# Patient Record
Sex: FEMALE | Race: WHITE | NOT HISPANIC OR LATINO | Employment: FULL TIME | URBAN - METROPOLITAN AREA
[De-identification: names, ages, dates, MRNs, and addresses within clinical notes are randomized per-mention and may not be internally consistent; named-entity substitution may affect disease eponyms.]

---

## 2017-01-06 DIAGNOSIS — Z00.00 ROUTINE GENERAL MEDICAL EXAMINATION AT A HEALTH CARE FACILITY: ICD-10-CM

## 2017-01-06 DIAGNOSIS — E78.5 HYPERLIPIDEMIA, UNSPECIFIED HYPERLIPIDEMIA TYPE: ICD-10-CM

## 2017-01-06 DIAGNOSIS — E55.9 VITAMIN D DEFICIENCY: Primary | ICD-10-CM

## 2017-01-09 LAB
25(OH)D3+25(OH)D2 SERPL-MCNC: 31.3 NG/ML
ALBUMIN SERPL-MCNC: 4.4 G/DL (ref 3.5–5.2)
ALBUMIN/GLOB SERPL: 1.8 G/DL
ALP SERPL-CCNC: 54 U/L (ref 40–129)
ALT SERPL-CCNC: 18 U/L (ref 5–33)
AST SERPL-CCNC: 18 U/L (ref 5–32)
BASOPHILS # BLD AUTO: 0.07 10*3/MM3 (ref 0–0.2)
BASOPHILS NFR BLD AUTO: 1.4 % (ref 0–2)
BILIRUB SERPL-MCNC: 0.6 MG/DL (ref 0.2–1.2)
BUN SERPL-MCNC: 12 MG/DL (ref 6–20)
BUN/CREAT SERPL: 15.6 (ref 7–25)
CALCIUM SERPL-MCNC: 9.4 MG/DL (ref 8.6–10.5)
CHLORIDE SERPL-SCNC: 102 MMOL/L (ref 98–107)
CHOLEST SERPL-MCNC: 156 MG/DL (ref 0–200)
CO2 SERPL-SCNC: 28.5 MMOL/L (ref 22–29)
CREAT SERPL-MCNC: 0.77 MG/DL (ref 0.57–1)
EOSINOPHIL # BLD AUTO: 0.25 10*3/MM3 (ref 0.1–0.3)
EOSINOPHIL NFR BLD AUTO: 5 % (ref 0–4)
ERYTHROCYTE [DISTWIDTH] IN BLOOD BY AUTOMATED COUNT: 12.5 % (ref 11.5–14.5)
GLOBULIN SER CALC-MCNC: 2.4 GM/DL
GLUCOSE SERPL-MCNC: 85 MG/DL (ref 65–99)
HCT VFR BLD AUTO: 40.9 % (ref 37–47)
HDLC SERPL-MCNC: 63 MG/DL (ref 40–60)
HGB BLD-MCNC: 13.3 G/DL (ref 12–16)
IMM GRANULOCYTES # BLD: 0.01 10*3/MM3 (ref 0–0.03)
IMM GRANULOCYTES NFR BLD: 0.2 % (ref 0–0.5)
LDLC SERPL CALC-MCNC: 63 MG/DL (ref 0–100)
LDLC/HDLC SERPL: 1.01 {RATIO}
LYMPHOCYTES # BLD AUTO: 1.38 10*3/MM3 (ref 0.6–4.8)
LYMPHOCYTES NFR BLD AUTO: 27.5 % (ref 20–45)
MCH RBC QN AUTO: 30.5 PG (ref 27–31)
MCHC RBC AUTO-ENTMCNC: 32.5 G/DL (ref 31–37)
MCV RBC AUTO: 93.8 FL (ref 81–99)
MONOCYTES # BLD AUTO: 0.42 10*3/MM3 (ref 0–1)
MONOCYTES NFR BLD AUTO: 8.4 % (ref 3–8)
NEUTROPHILS # BLD AUTO: 2.88 10*3/MM3 (ref 1.5–8.3)
NEUTROPHILS NFR BLD AUTO: 57.5 % (ref 45–70)
NRBC BLD AUTO-RTO: 0 /100 WBC (ref 0–0)
PLATELET # BLD AUTO: 254 10*3/MM3 (ref 140–500)
POTASSIUM SERPL-SCNC: 4.2 MMOL/L (ref 3.5–5.2)
PROT SERPL-MCNC: 6.8 G/DL (ref 6–8.5)
RBC # BLD AUTO: 4.36 10*6/MM3 (ref 4.2–5.4)
SODIUM SERPL-SCNC: 143 MMOL/L (ref 136–145)
TRIGL SERPL-MCNC: 148 MG/DL (ref 0–150)
TSH SERPL DL<=0.005 MIU/L-ACNC: 1.83 MIU/ML (ref 0.27–4.2)
VLDLC SERPL CALC-MCNC: 29.6 MG/DL (ref 7–27)
WBC # BLD AUTO: 5.01 10*3/MM3 (ref 4.8–10.8)

## 2017-01-16 ENCOUNTER — TELEPHONE (OUTPATIENT)
Dept: FAMILY MEDICINE CLINIC | Facility: CLINIC | Age: 57
End: 2017-01-16

## 2017-01-16 ENCOUNTER — OFFICE VISIT (OUTPATIENT)
Dept: FAMILY MEDICINE CLINIC | Facility: CLINIC | Age: 57
End: 2017-01-16

## 2017-01-16 ENCOUNTER — HOSPITAL ENCOUNTER (OUTPATIENT)
Dept: GENERAL RADIOLOGY | Facility: HOSPITAL | Age: 57
Discharge: HOME | End: 2017-01-16
Admitting: PHYSICIAN ASSISTANT

## 2017-01-16 ENCOUNTER — HOSPITAL ENCOUNTER (OUTPATIENT)
Dept: GENERAL RADIOLOGY | Facility: HOSPITAL | Age: 57
Discharge: HOME | End: 2017-01-16

## 2017-01-16 VITALS
HEIGHT: 65 IN | OXYGEN SATURATION: 94 % | BODY MASS INDEX: 30.16 KG/M2 | RESPIRATION RATE: 16 BRPM | TEMPERATURE: 98 F | SYSTOLIC BLOOD PRESSURE: 118 MMHG | DIASTOLIC BLOOD PRESSURE: 80 MMHG | HEART RATE: 62 BPM | WEIGHT: 181 LBS

## 2017-01-16 DIAGNOSIS — Z00.00 ANNUAL PHYSICAL EXAM: Primary | ICD-10-CM

## 2017-01-16 DIAGNOSIS — E55.9 VITAMIN D DEFICIENCY: ICD-10-CM

## 2017-01-16 DIAGNOSIS — E78.2 MIXED HYPERLIPIDEMIA: ICD-10-CM

## 2017-01-16 DIAGNOSIS — J18.9 PNEUMONIA OF UPPER LOBE DUE TO INFECTIOUS ORGANISM, UNSPECIFIED LATERALITY: ICD-10-CM

## 2017-01-16 DIAGNOSIS — M25.551 RIGHT HIP PAIN: ICD-10-CM

## 2017-01-16 PROBLEM — M25.559 HIP PAIN: Status: ACTIVE | Noted: 2017-01-16

## 2017-01-16 PROCEDURE — 73502 X-RAY EXAM HIP UNI 2-3 VIEWS: CPT

## 2017-01-16 PROCEDURE — 71020 HC CHEST PA AND LATERAL: CPT

## 2017-01-16 PROCEDURE — 99396 PREV VISIT EST AGE 40-64: CPT | Performed by: PHYSICIAN ASSISTANT

## 2017-01-16 RX ORDER — PRAVASTATIN SODIUM 20 MG
20 TABLET ORAL DAILY
Qty: 90 TABLET | Refills: 3 | Status: SHIPPED | OUTPATIENT
Start: 2017-01-16 | End: 2018-01-19 | Stop reason: SDUPTHER

## 2017-01-16 NOTE — PATIENT INSTRUCTIONS
"Generic Hip Exercises  RANGE OF MOTION (ROM) AND STRETCHING EXERCISES   These exercises may help you when beginning to rehabilitate your injury. Doing them too aggressively can worsen your condition. Complete them slowly and gently. Your symptoms may resolve with or without further involvement from your physician, physical therapist or . While completing these exercises, remember:   · Restoring tissue flexibility helps normal motion to return to the joints. This allows healthier, less painful movement and activity.  · An effective stretch should be held for at least 30 seconds.  · A stretch should never be painful. You should only feel a gentle lengthening or release in the stretched tissue. If these stretches worsen your symptoms even when done gently, consult your physician, physical therapist or .  STRETCH - Hamstrings, Supine   · Lie on your back. Loop a belt or towel over the ball of your right / left foot.  · Straighten your right / left knee and slowly pull on the belt to raise your leg. Do not allow the right / left knee to bend. Keep your opposite leg flat on the floor.  · Raise the leg until you feel a gentle stretch behind your right / left knee or thigh. Hold this position for __________ seconds.  Repeat __________ times. Complete this stretch __________ times per day.   STRETCH - Hip Rotators   · Lie on your back on a firm surface. Grasp your right / left knee with your right / left hand and your ankle with your opposite hand.  · Keeping your hips and shoulders firmly planted, gently pull your right / left knee and rotate your lower leg toward your opposite shoulder until you feel a stretch in your buttocks.  · Hold this stretch for __________ seconds.  Repeat this stretch __________ times. Complete this stretch __________ times per day.  STRETCH - Hamstrings/Adductors, V-Sit   · Sit on the floor with your legs extended in a large \"V,\" keeping your knees straight.  · With " your head and chest upright, bend at your waist reaching for your right foot to stretch your left adductors.  · You should feel a stretch in your left inner thigh. Hold for __________ seconds.  · Return to the upright position to relax your leg muscles.  · Continuing to keep your chest upright, bend straight forward at your waist to stretch your hamstrings.  · You should feel a stretch behind both of your thighs and/or knees. Hold for __________ seconds.  · Return to the upright position to relax your leg muscles.  · Repeat steps 2 through 4 for opposite leg.  Repeat __________ times. Complete this exercise __________ times per day.   STRETCHING - Hip Flexors, Lunge  · Half kneel with your right / left knee on the floor and your opposite knee bent and directly over your ankle.  · Keep good posture with your head over your shoulders. Tighten your buttocks to point your tailbone downward; this will prevent your back from arching too much.  · You should feel a gentle stretch in the front of your thigh and/or hip. If you do not feel any resistance, slightly slide your opposite foot forward and then slowly lunge forward so your knee once again lines up over your ankle. Be sure your tailbone remains pointed downward.  · Hold this stretch for __________ seconds.  Repeat __________ times. Complete this stretch __________ times per day.  STRENGTHENING EXERCISES  These exercises may help you when beginning to rehabilitate your injury. They may resolve your symptoms with or without further involvement from your physician, physical therapist or . While completing these exercises, remember:   · Muscles can gain both the endurance and the strength needed for everyday activities through controlled exercises.  · Complete these exercises as instructed by your physician, physical therapist or . Progress the resistance and repetitions only as guided.  · You may experience muscle soreness or fatigue, but  the pain or discomfort you are trying to eliminate should never worsen during these exercises. If this pain does worsen, stop and make certain you are following the directions exactly. If the pain is still present after adjustments, discontinue the exercise until you can discuss the trouble with your clinician.  STRENGTH - Hip Extensors, Bridge   · Lie on your back on a firm surface. Bend your knees and place your feet flat on the floor.  · Tighten your buttocks muscles and lift your bottom off the floor until your trunk is level with your thighs. You should feel the muscles in your buttocks and back of your thighs working. If you do not feel these muscles, slide your feet 1-2 inches further away from your buttocks.  · Hold this position for __________ seconds.  · Slowly lower your hips to the starting position and allow your buttock muscles relax completely before beginning the next repetition.  · If this exercise is too easy, you may cross your arms over your chest.  Repeat __________ times. Complete this exercise __________ times per day.   STRENGTH - Hip Abductors, Straight Leg Raises   Be aware of your form throughout the entire exercise so that you exercise the correct muscles. Sloppy form means that you are not strengthening the correct muscles.  · Lie on your side so that your head, shoulders, knee and hip line up. You may bend your lower knee to help maintain your balance. Your right / left leg should be on top.  · Roll your hips slightly forward, so that your hips are stacked directly over each other and your right / left knee is facing forward.  · Lift your top leg up 4-6 inches, leading with your heel. Be sure that your foot does not drift forward or that your knee does not roll toward the ceiling.  · Hold this position for __________ seconds. You should feel the muscles in your outer hip lifting (you may not notice this until your leg begins to tire).  · Slowly lower your leg to the starting position.  "Allow the muscles to fully relax before beginning the next repetition.  Repeat __________ times. Complete this exercise __________ times per day.   STRENGTH - Hip Adductors, Straight Leg Raises   · Lie on your side so that your head, shoulders, knee and hip line up. You may place your upper foot in front to help maintain your balance. Your right / left leg should be on the bottom.  · Roll your hips slightly forward, so that your hips are stacked directly over each other and your right / left knee is facing forward.  · Tense the muscles in your inner thigh and lift your bottom leg 4-6 inches. Hold this position for __________ seconds.  · Slowly lower your leg to the starting position. Allow the muscles to fully relax before beginning the next repetition.  Repeat __________ times. Complete this exercise __________ times per day.   STRENGTH - Quadriceps, Straight Leg Raises   Quality counts! Watch for signs that the quadriceps muscle is working to insure you are strengthening the correct muscles and not \"cheating\" by substituting with healthier muscles.  · Lay on your back with your right / left leg extended and your opposite knee bent.  · Tense the muscles in the front of your right / left thigh. You should see either your knee cap slide up or increased dimpling just above the knee. Your thigh may even quiver.  · Tighten these muscles even more and raise your leg 4 to 6 inches off the floor. Hold for right / left seconds.  · Keeping these muscles tense, lower your leg.  · Relax the muscles slowly and completely in between each repetition.  Repeat __________ times. Complete this exercise __________ times per day.   STRENGTH - Hip Abductors, Standing  · Tie one end of a rubber exercise band/tubing to a secure surface (table, pole) and tie a loop at the other end.  · Place the loop around your right / left ankle. Keeping your ankle with the band directly opposite of the secured end, step away until there is tension in " "the tube/band.  · Hold onto a chair as needed for balance.  · Keeping your back upright, your shoulders over your hips, and your toes pointing forward, lift your right / left leg out to your side. Be sure to lift your leg with your hip muscles. Do not \"throw\" your leg or tip your body to lift your leg.  · Slowly and with control, return to the starting position.  Repeat exercise __________ times. Complete this exercise __________ times per day.   STRENGTH - Quadriceps, Squats  ·  a door frame so that your feet and knees are in line with the frame.  · Use your hands for balance, not support, on the frame.  · Slowly lower your weight, bending at the hips and knees. Keep your lower legs upright so that they are parallel with the door frame. Squat only within the range that does not increase your knee pain. Never let your hips drop below your knees.  · Slowly return upright, pushing with your legs, not pulling with your hands.     This information is not intended to replace advice given to you by your health care provider. Make sure you discuss any questions you have with your health care provider.     Document Released: 01/05/2007 Document Revised: 01/08/2016 Document Reviewed: 04/01/2010  Elsevier Interactive Patient Education ©2016 [employer]    "

## 2017-01-16 NOTE — TELEPHONE ENCOUNTER
----- Message from Sari Yanes PA-C sent at 1/16/2017 12:36 PM EST -----  notify patient that chest x-ray was normal.  Pneumonia has resolved.

## 2017-01-16 NOTE — PROGRESS NOTES
Subjective   Nesha Kee is a 56 y.o. female.   Chief Complaint   Patient presents with   • Annual Exam     Lab review       History of Present Illness   Nesha is a 56-year-old female who presents for annual physical examination.  She has gained 3 pounds since her November 30, 2016 office visit. Nesha is doing good.  She still has a slight cough with exercising.  She was diagnosed with pneumonia in November,2016.    Bowel movements are daily.  Denied any dark black tarry stools.   Last PAP in 2015 with Dr. Torrez which was normal. Nesha had a partial hysterectomy.  Mammogram was performed in December with normal results.  Nesha has been having right hip pain for the past 3 months.  Describes the pain as a sharp pain that is worse with movement.  The pain waxes and wanes.   She has had some restless legs for the past few months.  Nesha has been using over-the-counter lavender lotion which has helped.  Denied any recent injury/falls.  She has not taken any OTC medications or creams for hip pain.   Denied any chest pain, shortness of air, dizziness, headache, vision changes, swelling of ankles or upper respiratory symptoms.  Nesha refuses flu shot at today's office visit.  Colonoscopy, mammogram and DEXA scan 6 are up-to-date.  Appetite has been healthy.      The following portions of the patient's history were reviewed and updated as appropriate: allergies, current medications, past family history, past medical history, past social history and past surgical history.    Review of Systems   Constitutional: Negative.  Negative for fatigue and fever.   HENT: Negative.    Eyes: Negative.    Respiratory: Negative.  Negative for cough, shortness of breath and wheezing.    Cardiovascular: Negative.  Negative for chest pain, palpitations and leg swelling.   Gastrointestinal: Negative.    Endocrine: Negative.    Genitourinary: Negative.    Musculoskeletal: Negative.         Right hip   Skin: Negative.   "  Allergic/Immunologic: Negative.    Neurological: Negative.    Hematological: Negative.    Psychiatric/Behavioral: Negative.  Negative for sleep disturbance and suicidal ideas.   All other systems reviewed and are negative.    Vitals:    01/16/17 0811   BP: 118/80   BP Location: Right arm   Patient Position: Sitting   Cuff Size: Adult   Pulse: 62   Resp: 16   Temp: 98 °F (36.7 °C)   TempSrc: Oral   SpO2: 94%   Weight: 181 lb (82.1 kg)   Height: 65\" (165.1 cm)       BP Readings from Last 3 Encounters:   01/16/17 118/80   11/30/16 110/72   11/20/16 129/80       Wt Readings from Last 3 Encounters:   01/16/17 181 lb (82.1 kg)   11/30/16 178 lb (80.7 kg)   11/20/16 175 lb (79.4 kg)     Body mass index is 30.12 kg/(m^2).    Allergies   Allergen Reactions   • Iodinated Diagnostic Agents    • Other      Iv contrast   • Sulfa Antibiotics        Objective   Physical Exam   Constitutional: She is oriented to person, place, and time. Vital signs are normal. She appears well-developed and well-nourished.   HENT:   Head: Normocephalic and atraumatic.   Right Ear: Hearing, tympanic membrane, external ear and ear canal normal.   Left Ear: Hearing, tympanic membrane, external ear and ear canal normal.   Nose: Nose normal. Right sinus exhibits no maxillary sinus tenderness and no frontal sinus tenderness. Left sinus exhibits no maxillary sinus tenderness and no frontal sinus tenderness.   Mouth/Throat: Uvula is midline, oropharynx is clear and moist and mucous membranes are normal.   Eyes: Conjunctivae, EOM and lids are normal. Pupils are equal, round, and reactive to light.   Neck: Trachea normal and phonation normal. Neck supple. Carotid bruit is not present. No edema present. No thyroid mass and no thyromegaly present.   Cardiovascular: Normal rate, regular rhythm, S1 normal, S2 normal, normal heart sounds and normal pulses.    Pulmonary/Chest: Effort normal and breath sounds normal.   Abdominal: Soft. Normal appearance, normal " aorta and bowel sounds are normal. There is no hepatomegaly. There is no tenderness.   Musculoskeletal: Normal range of motion.        Right hip: She exhibits tenderness (slightly) and bony tenderness. She exhibits normal range of motion, normal strength, no swelling and no crepitus.        Left hip: Normal. She exhibits normal range of motion, normal strength, no tenderness, no bony tenderness, no swelling, no crepitus, no deformity and no laceration.   Lymphadenopathy:     She has no cervical adenopathy.   Neurological: She is alert and oriented to person, place, and time. She has normal strength. No sensory deficit.   Skin: Skin is warm, dry and intact.   Psychiatric: She has a normal mood and affect. Her speech is normal and behavior is normal. Judgment and thought content normal. Cognition and memory are normal.       Assessment/Plan   Nesha was seen today for annual exam.    Diagnoses and all orders for this visit:    Annual physical exam    Mixed hyperlipidemia  -     pravastatin (PRAVACHOL) 20 MG tablet; Take 1 tablet by mouth Daily.    Vitamin D deficiency    Right hip pain  -     XR Hip With or Without Pelvis 2 - 3 View Right; Future      1. Annual physical examination I have discussed Nesha's lab work with her at today's office visit.  She will decrease her vitamin D to 2000 international units daily due to having normal vitamin D blood work this time.  Plan to repeat in 6 months.  2.  Chronic hyperlipidemia: Shiela cholesterol and triglycerides have improved.  She would like to try decreasing her pravastatin to 20 mg.  I have agreed to this.  We will recheck her final and 6 months.  I have sent a new prescription for pravastatin 20 mg for 90 days to her local pharmacy.  3.  New right hip pain: Nesha will have a right hip x-ray performed at today's office visit.  I suspect this may be bursitis or strangulated.  I have given her exercises to do at home as well.  He may use over-the-counter NSAIDs as  needed.  We will consider possible referral to orthopedic or physical therapist if warranted.        Orders Only on 01/06/2017   Component Date Value Ref Range Status   • TSH 01/09/2017 1.830  0.270 - 4.200 mIU/mL Final   • Total Cholesterol 01/09/2017 156  0 - 200 mg/dL Final   • Triglycerides 01/09/2017 148  0 - 150 mg/dL Final   • HDL Cholesterol 01/09/2017 63* 40 - 60 mg/dL Final   • VLDL Cholesterol 01/09/2017 29.6* 7 - 27 mg/dL Final   • LDL Cholesterol  01/09/2017 63  0 - 100 mg/dL Final   • LDL/HDL Ratio 01/09/2017 1.01   Final   • Glucose 01/09/2017 85  65 - 99 mg/dL Final   • BUN 01/09/2017 12  6 - 20 mg/dL Final   • Creatinine 01/09/2017 0.77  0.57 - 1.00 mg/dL Final   • eGFR Non  Am 01/09/2017 77  >60 mL/min/1.73 Final   • eGFR African Am 01/09/2017 94  >60 mL/min/1.73 Final   • BUN/Creatinine Ratio 01/09/2017 15.6  7.0 - 25.0 Final   • Sodium 01/09/2017 143  136 - 145 mmol/L Final   • Potassium 01/09/2017 4.2  3.5 - 5.2 mmol/L Final   • Chloride 01/09/2017 102  98 - 107 mmol/L Final   • Total CO2 01/09/2017 28.5  22.0 - 29.0 mmol/L Final   • Calcium 01/09/2017 9.4  8.6 - 10.5 mg/dL Final   • Total Protein 01/09/2017 6.8  6.0 - 8.5 g/dL Final   • Albumin 01/09/2017 4.40  3.50 - 5.20 g/dL Final   • Globulin 01/09/2017 2.4  gm/dL Final   • A/G Ratio 01/09/2017 1.8  g/dL Final   • Total Bilirubin 01/09/2017 0.6  0.2 - 1.2 mg/dL Final   • Alkaline Phosphatase 01/09/2017 54  40 - 129 U/L Final   • AST (SGOT) 01/09/2017 18  5 - 32 U/L Final   • ALT (SGPT) 01/09/2017 18  5 - 33 U/L Final   • WBC 01/09/2017 5.01  4.80 - 10.80 10*3/mm3 Final   • RBC 01/09/2017 4.36  4.20 - 5.40 10*6/mm3 Final   • Hemoglobin 01/09/2017 13.3  12.0 - 16.0 g/dL Final   • Hematocrit 01/09/2017 40.9  37.0 - 47.0 % Final   • MCV 01/09/2017 93.8  81.0 - 99.0 fL Final   • MCH 01/09/2017 30.5  27.0 - 31.0 pg Final   • MCHC 01/09/2017 32.5  31.0 - 37.0 g/dL Final   • RDW 01/09/2017 12.5  11.5 - 14.5 % Final   • Platelets 01/09/2017  254  140 - 500 10*3/mm3 Final   • Neutrophil Rel % 01/09/2017 57.5  45.0 - 70.0 % Final   • Lymphocyte Rel % 01/09/2017 27.5  20.0 - 45.0 % Final   • Monocyte Rel % 01/09/2017 8.4* 3.0 - 8.0 % Final   • Eosinophil Rel % 01/09/2017 5.0* 0.0 - 4.0 % Final   • Basophil Rel % 01/09/2017 1.4  0.0 - 2.0 % Final   • Neutrophils Absolute 01/09/2017 2.88  1.50 - 8.30 10*3/mm3 Final   • Lymphocytes Absolute 01/09/2017 1.38  0.60 - 4.80 10*3/mm3 Final   • Monocytes Absolute 01/09/2017 0.42  0.00 - 1.00 10*3/mm3 Final   • Eosinophils Absolute 01/09/2017 0.25  0.10 - 0.30 10*3/mm3 Final   • Basophils Absolute 01/09/2017 0.07  0.00 - 0.20 10*3/mm3 Final   • Immature Granulocyte Rel % 01/09/2017 0.2  0.0 - 0.5 % Final   • Immature Grans Absolute 01/09/2017 0.01  0.00 - 0.03 10*3/mm3 Final   • nRBC 01/09/2017 0.0  0.0 - 0.0 /100 WBC Final   • 25 Hydroxy, Vitamin D 01/09/2017 31.3  ng/mL Final    Comment: Reference Range for Total Vitamin D 25(OH)  Deficiency    <20.0 ng/mL  Insufficiency 21-29 ng/mL  Sufficiency   30-74 ng/mL             Dragon transcription disclaimer    Much of this encounter note is an electronic transcription/translation of spoken language to printed text.  The electronic translation of spoken language may permit erroneous, or at times, nonsensical words or phrases to be inadvertently transcribed.  Although I have reviewed the note for such errors, some may still exist.    Sari Yanes PA-C  [facility]

## 2017-01-18 DIAGNOSIS — M25.551 RIGHT HIP PAIN: Primary | ICD-10-CM

## 2017-01-19 ENCOUNTER — TELEPHONE (OUTPATIENT)
Dept: FAMILY MEDICINE CLINIC | Facility: CLINIC | Age: 57
End: 2017-01-19

## 2017-01-19 NOTE — TELEPHONE ENCOUNTER
----- Message from Sari Yanes PA-C sent at 1/18/2017  7:29 AM EST -----  Please notify patient right hip x-ray was normal.  I will schedule appointment with physical therapy for further evaluation and treatment.

## 2017-07-03 DIAGNOSIS — E55.9 VITAMIN D DEFICIENCY: ICD-10-CM

## 2017-07-03 DIAGNOSIS — E06.5 CHRONIC THYROIDITIS: ICD-10-CM

## 2017-07-03 DIAGNOSIS — E78.2 MIXED HYPERLIPIDEMIA: Primary | ICD-10-CM

## 2017-07-03 DIAGNOSIS — Z79.899 HIGH RISK MEDICATION USE: ICD-10-CM

## 2017-07-05 LAB
25(OH)D3+25(OH)D2 SERPL-MCNC: 19 NG/ML
ALBUMIN SERPL-MCNC: 4.4 G/DL (ref 3.5–5.2)
ALBUMIN/GLOB SERPL: 1.8 G/DL
ALP SERPL-CCNC: 53 U/L (ref 40–129)
ALT SERPL-CCNC: 21 U/L (ref 5–33)
AST SERPL-CCNC: 17 U/L (ref 5–32)
BASOPHILS # BLD AUTO: 0.06 10*3/MM3 (ref 0–0.2)
BASOPHILS NFR BLD AUTO: 1.3 % (ref 0–2)
BILIRUB SERPL-MCNC: 0.4 MG/DL (ref 0.2–1.2)
BUN SERPL-MCNC: 11 MG/DL (ref 6–20)
BUN/CREAT SERPL: 13.4 (ref 7–25)
CALCIUM SERPL-MCNC: 9.1 MG/DL (ref 8.6–10.5)
CHLORIDE SERPL-SCNC: 100 MMOL/L (ref 98–107)
CHOLEST SERPL-MCNC: 197 MG/DL (ref 0–200)
CO2 SERPL-SCNC: 25.5 MMOL/L (ref 22–29)
CREAT SERPL-MCNC: 0.82 MG/DL (ref 0.57–1)
EOSINOPHIL # BLD AUTO: 0.16 10*3/MM3 (ref 0.1–0.3)
EOSINOPHIL NFR BLD AUTO: 3.4 % (ref 0–4)
ERYTHROCYTE [DISTWIDTH] IN BLOOD BY AUTOMATED COUNT: 12.5 % (ref 11.5–14.5)
GLOBULIN SER CALC-MCNC: 2.4 GM/DL
GLUCOSE SERPL-MCNC: 94 MG/DL (ref 65–99)
HCT VFR BLD AUTO: 41.2 % (ref 37–47)
HDLC SERPL-MCNC: 60 MG/DL (ref 40–60)
HGB BLD-MCNC: 14 G/DL (ref 12–16)
IMM GRANULOCYTES # BLD: 0.01 10*3/MM3 (ref 0–0.03)
IMM GRANULOCYTES NFR BLD: 0.2 % (ref 0–0.5)
LDLC SERPL CALC-MCNC: 97 MG/DL (ref 0–100)
LDLC/HDLC SERPL: 1.61 {RATIO}
LYMPHOCYTES # BLD AUTO: 1.31 10*3/MM3 (ref 0.6–4.8)
LYMPHOCYTES NFR BLD AUTO: 27.9 % (ref 20–45)
MCH RBC QN AUTO: 30.8 PG (ref 27–31)
MCHC RBC AUTO-ENTMCNC: 34 G/DL (ref 31–37)
MCV RBC AUTO: 90.5 FL (ref 81–99)
MONOCYTES # BLD AUTO: 0.3 10*3/MM3 (ref 0–1)
MONOCYTES NFR BLD AUTO: 6.4 % (ref 3–8)
NEUTROPHILS # BLD AUTO: 2.86 10*3/MM3 (ref 1.5–8.3)
NEUTROPHILS NFR BLD AUTO: 60.8 % (ref 45–70)
NRBC BLD AUTO-RTO: 0 /100 WBC (ref 0–0)
PLATELET # BLD AUTO: 268 10*3/MM3 (ref 140–500)
POTASSIUM SERPL-SCNC: 4 MMOL/L (ref 3.5–5.2)
PROT SERPL-MCNC: 6.8 G/DL (ref 6–8.5)
RBC # BLD AUTO: 4.55 10*6/MM3 (ref 4.2–5.4)
SODIUM SERPL-SCNC: 139 MMOL/L (ref 136–145)
TRIGL SERPL-MCNC: 202 MG/DL (ref 0–150)
TSH SERPL DL<=0.005 MIU/L-ACNC: 1.64 MIU/ML (ref 0.27–4.2)
VLDLC SERPL CALC-MCNC: 40.4 MG/DL (ref 7–27)
WBC # BLD AUTO: 4.7 10*3/MM3 (ref 4.8–10.8)

## 2017-07-12 ENCOUNTER — OFFICE VISIT (OUTPATIENT)
Dept: FAMILY MEDICINE CLINIC | Facility: CLINIC | Age: 57
End: 2017-07-12

## 2017-07-12 VITALS
SYSTOLIC BLOOD PRESSURE: 128 MMHG | WEIGHT: 180 LBS | HEIGHT: 65 IN | BODY MASS INDEX: 29.99 KG/M2 | HEART RATE: 67 BPM | DIASTOLIC BLOOD PRESSURE: 88 MMHG | TEMPERATURE: 98.4 F | RESPIRATION RATE: 16 BRPM | OXYGEN SATURATION: 98 %

## 2017-07-12 DIAGNOSIS — E78.2 MIXED HYPERLIPIDEMIA: Primary | ICD-10-CM

## 2017-07-12 DIAGNOSIS — E55.9 VITAMIN D DEFICIENCY: ICD-10-CM

## 2017-07-12 DIAGNOSIS — M25.551 RIGHT HIP PAIN: ICD-10-CM

## 2017-07-12 PROCEDURE — 99213 OFFICE O/P EST LOW 20 MIN: CPT | Performed by: PHYSICIAN ASSISTANT

## 2017-07-12 RX ORDER — ERGOCALCIFEROL 1.25 MG/1
50000 CAPSULE ORAL WEEKLY
Qty: 12 CAPSULE | Refills: 2 | Status: SHIPPED | OUTPATIENT
Start: 2017-07-12 | End: 2018-04-06

## 2017-07-18 ENCOUNTER — TREATMENT (OUTPATIENT)
Dept: PHYSICAL THERAPY | Facility: CLINIC | Age: 57
End: 2017-07-18

## 2017-07-18 DIAGNOSIS — M25.551 RIGHT HIP PAIN: Primary | ICD-10-CM

## 2017-07-18 PROCEDURE — 97161 PT EVAL LOW COMPLEX 20 MIN: CPT | Performed by: PHYSICAL THERAPIST

## 2017-07-18 PROCEDURE — 97140 MANUAL THERAPY 1/> REGIONS: CPT | Performed by: PHYSICAL THERAPIST

## 2017-07-18 PROCEDURE — 97110 THERAPEUTIC EXERCISES: CPT | Performed by: PHYSICAL THERAPIST

## 2017-07-18 NOTE — PROGRESS NOTES
Physical Therapy Initial Evaluation and Plan of Care        Patient: Nesha Kee   : 1960  Diagnosis/ICD-10 Code:  Right hip pain [M25.551]  Referring practitioner: Sari Yanes PA-C  Date of Initial Visit: 2017  Today's Date: 2017  Patient seen for 1 sessions                 Subjective Evaluation    History of Present Illness  Date of onset: 10/10/2016  Mechanism of injury: Increased pain with prolonged sitting - pain in feet when attempting to stand and walk - previous hernia repair still some tenderness there      Patient Occupation:  - mainly sitting Quality of life: excellent    Pain  Current pain ratin  At best pain ratin  At worst pain ratin  Location: right lateral and anterior hip and into right foot   Quality: dull ache (needs to pop)  Relieving factors: medications and rest (ibuprofen)  Exacerbated by: prolonged sitting and walking.  Progression: worsening    Diagnostic Tests  X-ray: normal (hip)    Treatments  Current treatment: physical therapy  Patient Goals  Patient goals for therapy: decreased pain, increased motion, increased strength, independence with ADLs/IADLs, return to sport/leisure activities and return to work  Patient goal: SHORT TERM GOALS:  2 weeks       1. Pt independent with HEP  2. Pt to demonstrate kai hip strength 5/5 to improve stability with ambulation and uneven surfaces  3. Pt to report being able to walk for 10 minutes without increasing pain in the right hip    LONG TERM GOALS:   6 weeks  1. Pt to demonstrate ability to perform full functional squat with good form and control of the hips and without increasing pain  2. Pt to return to work full duty without increased pain in the right hip(s)   3. Pt to demonstrate ability to perform step up/down 8 inch step x10 safely and without pain in the right hip(s)            Objective     Palpation     Right   Hypertonic in the piriformis and TFL.     Tenderness     Right Hip    Tenderness in the PSIS, greater trochanter, inguinal ligament and sacroiliac joint.     Neurological Testing   Sensation     Lumbar   Left   Intact: light touch    Right   Intact: light touch    Active Range of Motion     Lumbar   Flexion: 100 degrees   Extension: 90 degrees with pain  Left lateral flexion: 100 degrees   Right lateral flexion: 100 degrees   Left rotation: 100 degrees   Right rotation: 100 degrees     Right Hip   External rotation (90/90): 45 degrees   Internal rotation (90/90): 35 degrees with pain    Strength/Myotome Testing     Left Hip   Planes of Motion   Flexion: 5  Abduction: 5  Adduction: 5  External rotation: 5  Internal rotation: 5    Right Hip   Planes of Motion   Flexion: 5  Abduction: 5  Adduction: 5  Right hip external rotation strength: 5-/5.  Internal rotation: 5    Left Knee   Flexion: 5  Extension: 5    Right Knee   Flexion: 5  Extension: 5    Left Ankle/Foot   Dorsiflexion: 5  Plantar flexion: 5    Right Ankle/Foot   Dorsiflexion: 5  Plantar flexion: 5    Tests       Thoracic   Negative slump.     Lumbar     Left   Positive femoral stretch.   Negative crossed SLR and passive SLR.     Right   Negative passive SLR.     Right Hip   Positive piriformis, scour and SI compression.     Additional Tests Details  Negative B [place] and PPU    Ambulation     Observational Gait   Gait: within functional limits          Assessment & Plan     Assessment  Impairments: abnormal muscle tone, abnormal or restricted ROM, activity intolerance, impaired physical strength and pain with function  Other impairment: previous lumbar discectomy   Assessment details: 56 y.o female present with 1) tender right greater trochanter and right PSIS 2) decreased trunk AROM 3) decreased right hip flexibility 4) decreased tolerance to prolonged sitting and standing   Prognosis: good    Goals  SHORT TERM GOALS:  2 weeks       1. Pt independent with HEP  2. Pt to demonstrate kai hip strength 5/5 to improve stability  with ambulation and uneven surfaces  3. Pt to report being able to walk for 10 minutes without increasing pain in the right hip    LONG TERM GOALS:   6 weeks  1. Pt to demonstrate ability to perform full functional squat with good form and control of the hips and without increasing pain  2. Pt to return to work full duty without increased pain in the right hip(s)   3. Pt to demonstrate ability to perform step up/down 8 inch step x10 safely and without pain in the right hip(s)     Plan  Therapy options: will be seen for skilled physical therapy services  Planned modality interventions: cryotherapy, electrical stimulation/Danish stimulation, ultrasound, iontophoresis and thermotherapy (hydrocollator packs)  Planned therapy interventions: flexibility, functional ROM exercises, home exercise program, joint mobilization, manual therapy, neuromuscular re-education, soft tissue mobilization, spinal/joint mobilization, strengthening, stretching and therapeutic activities  Frequency: 2x week  Duration in weeks: 8  Functional Limitations: sleeping, walking, uncomfortable because of pain, sitting, standing and stooping      Manual Therapy:    8     mins  22682;  Therapeutic Exercise:    25     mins  29074;     Neuromuscular Ele:        mins  25134;    Therapeutic Activity:          mins  90816;     Gait Training:           mins  96485;     Ultrasound:    8     mins  90221;    Electrical Stimulation:         mins  44066 ( );  Dry Needling          mins self-pay    Timed Treatment:   41   mins   Total Treatment:     63   mins    PT SIGNATURE: Kaylan Cristobal, PT   DATE TREATMENT INITIATED: 7/18/2017    Initial Certification  Certification Period: 10/16/2017  I certify that the therapy services are furnished while this patient is under my care.  The services outlined above are required by this patient, and will be reviewed every 90 days.     PHYSICIAN: Sari Yanes PA-C      DATE:     Please sign and return via  fax to [phone].. Thank you, [facility].

## 2017-07-20 ENCOUNTER — TREATMENT (OUTPATIENT)
Dept: PHYSICAL THERAPY | Facility: CLINIC | Age: 57
End: 2017-07-20

## 2017-07-20 DIAGNOSIS — M25.551 RIGHT HIP PAIN: Primary | ICD-10-CM

## 2017-07-20 PROCEDURE — 97110 THERAPEUTIC EXERCISES: CPT | Performed by: PHYSICAL THERAPIST

## 2017-07-20 PROCEDURE — 97014 ELECTRIC STIMULATION THERAPY: CPT | Performed by: PHYSICAL THERAPIST

## 2017-07-20 PROCEDURE — 97140 MANUAL THERAPY 1/> REGIONS: CPT | Performed by: PHYSICAL THERAPIST

## 2017-07-20 NOTE — PROGRESS NOTES
Physical Therapy Daily Progress Note        Visit # : 2  Nesha Kee reports: My right hip and knee have been feeling better - my low back is a little sore today - was on my feet a lot yesterday     Subjective     Objective     Postural Observations    Additional Postural Observation Details  = ASIS and iliac crest in supine prior to treatment     Tenderness     Right Hip   No tenderness in the greater trochanter.      See Exercise, Manual, and Modality Logs for complete treatment.       Assessment & Plan     Assessment  Assessment details: Held US today secondary to no tenderness right greater trochanter. Patient presented with mild tenderness and c/o low back discomfort. Tolerated gentle lumbar stabilization progressions well today. Cont PT 2-3x per week for lumbar stabilization progressions and SI alignment assessment - next visit attempt bridges, and/or  with marches if tolerated.         Progress strengthening /stabilization /functional activity           Manual Therapy:    8     mins  12184;  Therapeutic Exercise:    22     mins  32242;     Neuromuscular Ele:        mins  26024;    Therapeutic Activity:          mins  38494;     Gait Training:           mins  56249;     Ultrasound:          mins  47566;    Electrical Stimulation:    15     mins  26285 ( );  Dry Needling          mins self-pay    Timed Treatment:   30   mins   Total Treatment:     45   mins    Kaylan Cristobal, PT  Physical Therapist  [place] License # [id number]

## 2017-07-24 ENCOUNTER — TREATMENT (OUTPATIENT)
Dept: PHYSICAL THERAPY | Facility: CLINIC | Age: 57
End: 2017-07-24

## 2017-07-24 DIAGNOSIS — M25.551 RIGHT HIP PAIN: Primary | ICD-10-CM

## 2017-07-24 PROCEDURE — 97014 ELECTRIC STIMULATION THERAPY: CPT | Performed by: PHYSICAL THERAPIST

## 2017-07-24 PROCEDURE — 97110 THERAPEUTIC EXERCISES: CPT | Performed by: PHYSICAL THERAPIST

## 2017-07-24 NOTE — PROGRESS NOTES
Physical Therapy Daily Progress Note        Visit # : 3  Nesha Kee reports: I went for a walk this morning and my right hip is hurting a little bit - I bet I am out     Subjective     Objective     Postural Observations    Additional Postural Observation Details  Left Upslip and Infare present today     See Exercise, Manual, and Modality Logs for complete treatment.       Assessment & Plan     Assessment  Assessment details: Minimal tenderness R greater trochanter. Patient presented with mild tenderness and c/o low back discomfort. Tolerated gentle lumbar stabilization progressions well today. Cont PT 2-3x per week for lumbar stabilization progressions and SI alignment assessment - next visit attempt AH with marches and/or prone leg lifts  if tolerated.         Progress strengthening /stabilization /functional activity           Manual Therapy:    8     mins  46000;  Therapeutic Exercise:    25     mins  46956;     Neuromuscular Ele:        mins  05218;    Therapeutic Activity:          mins  25225;     Gait Training:           mins  73693;     Ultrasound:          mins  91979;    Electrical Stimulation:    15     mins  84793 ( );  Dry Needling          mins self-pay    Timed Treatment:   33   mins   Total Treatment:     48   mins    Kaylan Cristobal PT  Physical Therapist  [place] License # [id number]

## 2017-07-27 ENCOUNTER — TREATMENT (OUTPATIENT)
Dept: PHYSICAL THERAPY | Facility: CLINIC | Age: 57
End: 2017-07-27

## 2017-07-27 DIAGNOSIS — M25.551 RIGHT HIP PAIN: Primary | ICD-10-CM

## 2017-07-27 PROCEDURE — 97014 ELECTRIC STIMULATION THERAPY: CPT | Performed by: PHYSICAL THERAPIST

## 2017-07-27 PROCEDURE — 97140 MANUAL THERAPY 1/> REGIONS: CPT | Performed by: PHYSICAL THERAPIST

## 2017-07-27 PROCEDURE — 97530 THERAPEUTIC ACTIVITIES: CPT | Performed by: PHYSICAL THERAPIST

## 2017-07-27 PROCEDURE — 97110 THERAPEUTIC EXERCISES: CPT | Performed by: PHYSICAL THERAPIST

## 2017-07-27 NOTE — PROGRESS NOTES
Physical Therapy Daily Progress Note        Visit # : 4  Nesha Kee reports: My lower back and hip have been feeling pretty good - occasional low back pain     Subjective     Objective     Postural Observations    Additional Postural Observation Details  Right inflare and Upslip prior to treatment      See Exercise, Manual, and Modality Logs for complete treatment.       Assessment & Plan     Assessment  Assessment details: Patient presented with mild right upslip and inflare prior to treatment today. Tolerated gentle lumbar stabilization progressions well today. Cont PT 2-3x per week for lumbar stabilization progressions and SI alignment assessment - next visit attempt roll for control and/or resisted side-lying ER  if tolerated.         Progress strengthening /stabilization /functional activity           Manual Therapy:    8     mins  59004;  Therapeutic Exercise:    20     mins  36040;     Neuromuscular Ele:        mins  00539;    Therapeutic Activity:     10     mins  39847;     Gait Training:           mins  33266;     Ultrasound:          mins  37703;    Electrical Stimulation:    15     mins  35763 ( );  Dry Needling          mins self-pay    Timed Treatment:   38   mins   Total Treatment:     53   mins    Kaylan Cristobal, PT  Physical Therapist  [place] License # [id number]

## 2017-08-02 ENCOUNTER — TREATMENT (OUTPATIENT)
Dept: PHYSICAL THERAPY | Facility: CLINIC | Age: 57
End: 2017-08-02

## 2017-08-02 DIAGNOSIS — M25.551 RIGHT HIP PAIN: Primary | ICD-10-CM

## 2017-08-02 PROCEDURE — 97014 ELECTRIC STIMULATION THERAPY: CPT | Performed by: PHYSICAL THERAPIST

## 2017-08-02 PROCEDURE — 97110 THERAPEUTIC EXERCISES: CPT | Performed by: PHYSICAL THERAPIST

## 2017-08-02 PROCEDURE — 97530 THERAPEUTIC ACTIVITIES: CPT | Performed by: PHYSICAL THERAPIST

## 2017-08-02 NOTE — PROGRESS NOTES
Physical Therapy Daily Progress Note         Visit # : 5  Nesha Kee reports: I noticed my right hip was popping when doing my exercise over the weekend     Subjective     Objective     Postural Observations    Additional Postural Observation Details  = supine ASIS and Iliac Crest prior to treatment      See Exercise, Manual, and Modality Logs for complete treatment.       Assessment & Plan     Assessment  Assessment details: Patient presented with mild tenderness and c/o right hip popping. Inflare presented after there ex attempts today however, hip popping was not able to be recreated. Tolerated gentle lumbar stabilization progressions well today. Cont PT 2-3x per week for lumbar stabilization progressions and SI alignment assessment - next visit attempt alternate arm and/or quadruped leg lifts  if tolerated.         Progress strengthening /stabilization /functional activity           Manual Therapy:    2     mins  04573;  Therapeutic Exercise:    20     mins  15480;     Neuromuscular Ele:        mins  48911;    Therapeutic Activity:     13     mins  34773;     Gait Training:           mins  52145;     Ultrasound:          mins  07948;    Electrical Stimulation:    15     mins  17269 ( );  Dry Needling          mins self-pay    Timed Treatment:   35   mins   Total Treatment:     50   mins    Kaylan Cristobal PT  Physical Therapist  [place] License # [id number]

## 2017-08-07 ENCOUNTER — TREATMENT (OUTPATIENT)
Dept: PHYSICAL THERAPY | Facility: CLINIC | Age: 57
End: 2017-08-07

## 2017-08-07 DIAGNOSIS — M25.551 RIGHT HIP PAIN: Primary | ICD-10-CM

## 2017-08-07 PROCEDURE — 97530 THERAPEUTIC ACTIVITIES: CPT | Performed by: PHYSICAL THERAPIST

## 2017-08-07 PROCEDURE — 97014 ELECTRIC STIMULATION THERAPY: CPT | Performed by: PHYSICAL THERAPIST

## 2017-08-07 PROCEDURE — 97110 THERAPEUTIC EXERCISES: CPT | Performed by: PHYSICAL THERAPIST

## 2017-08-07 NOTE — PROGRESS NOTES
Physical Therapy Daily Progress Note      Visit # : 6  Nesha Kee reports: My feet have been hurting me in the mornings but my hip has really felt better    Subjective     Objective     Postural Observations    Additional Postural Observation Details  Minimal left inflare prior to treatment today      See Exercise, Manual, and Modality Logs for complete treatment.       Assessment & Plan     Assessment  Assessment details: Patient reports minimal hip popping since last visit. Inflare present prior to there ex attempts today. Tolerated gentle lumbar stabilization progressions well today. Cont PT 2-3x per week for lumbar stabilization progressions and SI alignment assessment - next visit attempt resisted ambulation if tolerated.         Progress strengthening /stabilization /functional activity           Manual Therapy:    2     mins  88393;  Therapeutic Exercise:    23     mins  39163;     Neuromuscular Ele:        mins  91833;    Therapeutic Activity:     10     mins  33334;     Gait Training:           mins  06041;     Ultrasound:          mins  73717;    Electrical Stimulation:    15     mins  30714 ( );  Dry Needling          mins self-pay    Timed Treatment:   35   mins   Total Treatment:     50   mins    Kaylan Cristobal PT  Physical Therapist  [place] License # [id number]

## 2017-11-07 ENCOUNTER — HOSPITAL ENCOUNTER (OUTPATIENT)
Dept: GENERAL RADIOLOGY | Facility: HOSPITAL | Age: 57
Discharge: HOME | End: 2017-11-07
Attending: PODIATRIST | Admitting: PODIATRIST

## 2017-11-07 ENCOUNTER — TRANSCRIBE ORDERS (OUTPATIENT)
Dept: ADMINISTRATIVE | Facility: HOSPITAL | Age: 57
End: 2017-11-07

## 2017-11-07 DIAGNOSIS — M21.621 TAILOR'S BUNION OF RIGHT FOOT: ICD-10-CM

## 2017-11-07 DIAGNOSIS — M21.621 TAILOR'S BUNION OF RIGHT FOOT: Primary | ICD-10-CM

## 2017-11-07 PROCEDURE — 73630 X-RAY EXAM OF FOOT: CPT

## 2017-12-27 ENCOUNTER — TRANSCRIBE ORDERS (OUTPATIENT)
Dept: FAMILY MEDICINE CLINIC | Facility: CLINIC | Age: 57
End: 2017-12-27

## 2017-12-27 DIAGNOSIS — Z12.39 SCREENING BREAST EXAMINATION: Primary | ICD-10-CM

## 2018-01-08 DIAGNOSIS — E06.5 CHRONIC THYROIDITIS: ICD-10-CM

## 2018-01-08 DIAGNOSIS — Z11.59 NEED FOR HEPATITIS C SCREENING TEST: ICD-10-CM

## 2018-01-08 DIAGNOSIS — E55.9 VITAMIN D DEFICIENCY: ICD-10-CM

## 2018-01-08 DIAGNOSIS — E78.2 MIXED HYPERLIPIDEMIA: Primary | ICD-10-CM

## 2018-01-11 ENCOUNTER — TELEPHONE (OUTPATIENT)
Dept: FAMILY MEDICINE CLINIC | Facility: CLINIC | Age: 58
End: 2018-01-11

## 2018-01-11 LAB
25(OH)D3+25(OH)D2 SERPL-MCNC: 31.3 NG/ML
ALBUMIN SERPL-MCNC: 4.2 G/DL (ref 3.5–5.2)
ALBUMIN/GLOB SERPL: 1.9 G/DL
ALP SERPL-CCNC: 58 U/L (ref 40–129)
ALT SERPL-CCNC: 24 U/L (ref 5–33)
AST SERPL-CCNC: 19 U/L (ref 5–32)
BASOPHILS # BLD AUTO: 0.07 10*3/MM3 (ref 0–0.2)
BASOPHILS NFR BLD AUTO: 1.4 % (ref 0–2)
BILIRUB SERPL-MCNC: 0.3 MG/DL (ref 0.2–1.2)
BUN SERPL-MCNC: 12 MG/DL (ref 6–20)
BUN/CREAT SERPL: 15.2 (ref 7–25)
CALCIUM SERPL-MCNC: 9.3 MG/DL (ref 8.6–10.5)
CHLORIDE SERPL-SCNC: 102 MMOL/L (ref 98–107)
CHOLEST SERPL-MCNC: 195 MG/DL (ref 0–200)
CO2 SERPL-SCNC: 29.2 MMOL/L (ref 22–29)
CREAT SERPL-MCNC: 0.79 MG/DL (ref 0.57–1)
EOSINOPHIL # BLD AUTO: 0.18 10*3/MM3 (ref 0.1–0.3)
EOSINOPHIL NFR BLD AUTO: 3.6 % (ref 0–4)
ERYTHROCYTE [DISTWIDTH] IN BLOOD BY AUTOMATED COUNT: 12 % (ref 11.5–14.5)
GLOBULIN SER CALC-MCNC: 2.2 GM/DL
GLUCOSE SERPL-MCNC: 87 MG/DL (ref 65–99)
HCT VFR BLD AUTO: 40.9 % (ref 37–47)
HCV AB S/CO SERPL IA: <0.1 S/CO RATIO (ref 0–0.9)
HDLC SERPL-MCNC: 60 MG/DL (ref 40–60)
HGB BLD-MCNC: 13.6 G/DL (ref 12–16)
IMM GRANULOCYTES # BLD: 0.01 10*3/MM3 (ref 0–0.03)
IMM GRANULOCYTES NFR BLD: 0.2 % (ref 0–0.5)
LDLC SERPL CALC-MCNC: 100 MG/DL (ref 0–100)
LDLC/HDLC SERPL: 1.66 {RATIO}
LYMPHOCYTES # BLD AUTO: 1.34 10*3/MM3 (ref 0.6–4.8)
LYMPHOCYTES NFR BLD AUTO: 26.7 % (ref 20–45)
MCH RBC QN AUTO: 31.2 PG (ref 27–31)
MCHC RBC AUTO-ENTMCNC: 33.3 G/DL (ref 31–37)
MCV RBC AUTO: 93.8 FL (ref 81–99)
MONOCYTES # BLD AUTO: 0.4 10*3/MM3 (ref 0–1)
MONOCYTES NFR BLD AUTO: 8 % (ref 3–8)
NEUTROPHILS # BLD AUTO: 3.01 10*3/MM3 (ref 1.5–8.3)
NEUTROPHILS NFR BLD AUTO: 60.1 % (ref 45–70)
NRBC BLD AUTO-RTO: 0 /100 WBC (ref 0–0)
PLATELET # BLD AUTO: 251 10*3/MM3 (ref 140–500)
POTASSIUM SERPL-SCNC: 4.6 MMOL/L (ref 3.5–5.2)
PROT SERPL-MCNC: 6.4 G/DL (ref 6–8.5)
RBC # BLD AUTO: 4.36 10*6/MM3 (ref 4.2–5.4)
SODIUM SERPL-SCNC: 141 MMOL/L (ref 136–145)
TRIGL SERPL-MCNC: 176 MG/DL (ref 0–150)
TSH SERPL DL<=0.005 MIU/L-ACNC: 1.1 MIU/ML (ref 0.27–4.2)
VLDLC SERPL CALC-MCNC: 35.2 MG/DL (ref 7–27)
WBC # BLD AUTO: 5.01 10*3/MM3 (ref 4.8–10.8)

## 2018-01-11 NOTE — TELEPHONE ENCOUNTER
----- Message from Sari Yanes PA-C sent at 1/11/2018  7:24 AM EST -----  1.  Notify patient that CBC was normal.  2.  Blood sugar was 87.  3.  Cholesterol was 195, triglycerides 176, HDL 60 and LDL was 100.  Triglycerides were slightly elevated.  Please decrease fatty food and carbohydrates in diet.  Plan to repeat fasting blood work in 6 months.  4.  TSH was 1.1.  5.  Vitamin D level was normal at 31.3.  6.  Hepatitis C antibody screening test was negative

## 2018-01-15 ENCOUNTER — HOSPITAL ENCOUNTER (OUTPATIENT)
Dept: MAMMOGRAPHY | Facility: HOSPITAL | Age: 58
Discharge: HOME | End: 2018-01-15
Admitting: PHYSICIAN ASSISTANT

## 2018-01-15 ENCOUNTER — TELEPHONE (OUTPATIENT)
Dept: FAMILY MEDICINE CLINIC | Facility: CLINIC | Age: 58
End: 2018-01-15

## 2018-01-15 DIAGNOSIS — Z12.39 SCREENING BREAST EXAMINATION: ICD-10-CM

## 2018-01-15 PROCEDURE — 77063 BREAST TOMOSYNTHESIS BI: CPT

## 2018-01-15 PROCEDURE — 77067 SCR MAMMO BI INCL CAD: CPT

## 2018-01-15 NOTE — TELEPHONE ENCOUNTER
----- Message from Sari Yanes PA-C sent at 1/15/2018 10:35 AM EST -----  Please notify patient that mammogram was normal.  Plan to repeat in one year.

## 2018-01-17 ENCOUNTER — OFFICE VISIT (OUTPATIENT)
Dept: FAMILY MEDICINE CLINIC | Facility: CLINIC | Age: 58
End: 2018-01-17

## 2018-01-17 VITALS
SYSTOLIC BLOOD PRESSURE: 120 MMHG | RESPIRATION RATE: 16 BRPM | HEART RATE: 87 BPM | TEMPERATURE: 98.6 F | WEIGHT: 180 LBS | OXYGEN SATURATION: 97 % | HEIGHT: 65 IN | DIASTOLIC BLOOD PRESSURE: 72 MMHG | BODY MASS INDEX: 29.99 KG/M2

## 2018-01-17 DIAGNOSIS — Z00.00 ANNUAL PHYSICAL EXAM: Primary | ICD-10-CM

## 2018-01-17 DIAGNOSIS — N81.6 CYSTOCELE WITH RECTOCELE: ICD-10-CM

## 2018-01-17 DIAGNOSIS — E78.2 MIXED HYPERLIPIDEMIA: ICD-10-CM

## 2018-01-17 DIAGNOSIS — Z01.419 PAP SMEAR, AS PART OF ROUTINE GYNECOLOGICAL EXAMINATION: ICD-10-CM

## 2018-01-17 DIAGNOSIS — N81.10 CYSTOCELE WITH RECTOCELE: ICD-10-CM

## 2018-01-17 LAB
DEVELOPER EXPIRATION DATE: NORMAL
DEVELOPER LOT NUMBER: NORMAL
EXPIRATION DATE: NORMAL
FECAL OCCULT BLOOD SCREEN, POC: NEGATIVE
Lab: NORMAL
NEGATIVE CONTROL: NEGATIVE
POSITIVE CONTROL: POSITIVE

## 2018-01-17 PROCEDURE — 82274 ASSAY TEST FOR BLOOD FECAL: CPT | Performed by: PHYSICIAN ASSISTANT

## 2018-01-17 PROCEDURE — 99396 PREV VISIT EST AGE 40-64: CPT | Performed by: PHYSICIAN ASSISTANT

## 2018-01-17 NOTE — PROGRESS NOTES
"Subjective   Nesha Kee is a 57 y.o. female.   Chief Complaint   Patient presents with   • Annual Exam   • Hyperlipidemia       History of Present Illness     Nesha is a 57-year-old female who presents for annual physical examination and hyperlipidemia management. She is due for colonoscopy in December 2018 with Dr. Devine.  Bowel movements are daily without dark black tarry stools.  Last pap was in 2015 with Dr. Torrez..  She has had a partial hysterectomy.  Denied any breast pain/lumps/discharge,vaginal discharge,dysuria,incontinence, chest pain,shortness of air,dizziness,abdomen pain,swelling of ankles, or URI symptoms.  Appetite has been normal.  Sleep has been normal. She has been preforming breast exams at home.    The following portions of the patient's history were reviewed and updated as appropriate: allergies, current medications, past family history, past medical history, past social history and past surgical history.    Review of Systems   All other systems reviewed and are negative.    Vitals:    01/17/18 0809   BP: 120/72   BP Location: Right arm   Patient Position: Sitting   Cuff Size: Large Adult   Pulse: 87   Resp: 16   Temp: 98.6 °F (37 °C)   TempSrc: Oral   SpO2: 97%   Weight: 81.6 kg (180 lb)   Height: 165.1 cm (65\")       Wt Readings from Last 3 Encounters:   01/17/18 81.6 kg (180 lb)   07/12/17 81.6 kg (180 lb)   01/16/17 82.1 kg (181 lb)       BP Readings from Last 3 Encounters:   01/17/18 120/72   07/12/17 128/88   01/16/17 118/80     Body mass index is 29.95 kg/(m^2).  Allergies   Allergen Reactions   • Iodinated Diagnostic Agents    • Other      Iv contrast   • Sulfa Antibiotics        Objective   Physical Exam   Constitutional: She is oriented to person, place, and time. Vital signs are normal. She appears well-developed and well-nourished.   HENT:   Head: Normocephalic and atraumatic.   Right Ear: Hearing, tympanic membrane, external ear and ear canal normal.   Left Ear: " Hearing, tympanic membrane, external ear and ear canal normal.   Nose: Nose normal. Right sinus exhibits no maxillary sinus tenderness and no frontal sinus tenderness. Left sinus exhibits no maxillary sinus tenderness and no frontal sinus tenderness.   Mouth/Throat: Uvula is midline, oropharynx is clear and moist and mucous membranes are normal.   Eyes: Conjunctivae, EOM and lids are normal. Pupils are equal, round, and reactive to light.   Neck: Trachea normal and phonation normal. Neck supple. Carotid bruit is not present. No edema present. No thyroid mass and no thyromegaly present.   Cardiovascular: Normal rate, regular rhythm, S1 normal, S2 normal, normal heart sounds and normal pulses.    Pulmonary/Chest: Effort normal and breath sounds normal. Right breast exhibits no inverted nipple, no mass, no nipple discharge, no skin change and no tenderness. Left breast exhibits no inverted nipple, no mass, no nipple discharge, no skin change and no tenderness. Breasts are symmetrical. There is no breast swelling.   Abdominal: Soft. Normal appearance, normal aorta and bowel sounds are normal. There is no hepatomegaly. There is no tenderness. Hernia confirmed negative in the right inguinal area and confirmed negative in the left inguinal area.   Genitourinary: Rectal exam shows guaiac negative stool. No breast tenderness, discharge or bleeding. Pelvic exam was performed with patient supine. There is no rash, tenderness, lesion or injury on the right labia. There is no rash, tenderness, lesion or injury on the left labia. Right adnexum displays no mass, no tenderness and no fullness. Left adnexum displays no mass and no tenderness.   Genitourinary Comments: Exam was chaperoned by Ashlee Segura LPN.  Uterus is absent due to hysterectomy.  Grade 2-3 rectocele and cystocele is noted.   Lymphadenopathy:     She has no cervical adenopathy.     She has no axillary adenopathy.        Right: No inguinal adenopathy present.         Left: No inguinal adenopathy present.   Neurological: She is alert and oriented to person, place, and time. She has normal reflexes.   Reflex Scores:       Patellar reflexes are 2+ on the right side and 2+ on the left side.  Skin: Skin is warm, dry and intact.   Psychiatric: She has a normal mood and affect. Her speech is normal and behavior is normal. Judgment and thought content normal. Cognition and memory are normal.       Results for orders placed or performed in visit on 01/17/18   POC Occult Blood Stool   Result Value Ref Range    Fecal Occult Blood Negative Negative    Lot Number [id number]     Expiration Date 1312019     DEVELOPER LOT NUMBER [id number]     DEVELOPER EXPIRATION DATE 1312019     Positive Control Positive Positive    Negative Control Negative Negative       Assessment/Plan   Nesha was seen today for annual exam and hyperlipidemia.    Diagnoses and all orders for this visit:    Annual physical exam    Mixed hyperlipidemia    Pap smear, as part of routine gynecological examination  -     POC Occult Blood Stool  -     Pap IG, Rfx HPV All Pth - ThinPrep Vial, Cervix    Cystocele with rectocele  -     Ambulatory Referral to Gynecologic Urology      1.  Annual physical examination with Pap smear: A Pap smear was collected and sent to the laboratory for further evaluation.  Her in office Hemoccult was negative.  Nesha will be notified of test results when completed.  A cystocele and rectocele were noted on physical examination.  I have placed a referral to gynecologist urologist for further evaluation and treatment.  2.  Chronic hyperlipidemia: I have reviewed Nesha's lab work with her at today's office visit.  I have completed her biometric form and have given her a copy.  She was encouraged to decrease carbohydrates and fatty food in diet and to increase physical activity.  She will return to office in 6 months for reevaluation with fasting blood work.  Nesha voiced understanding.          Orders Only on  01/08/2018   Component Date Value Ref Range Status   • WBC 01/10/2018 5.01  4.80 - 10.80 10*3/mm3 Final   • RBC 01/10/2018 4.36  4.20 - 5.40 10*6/mm3 Final   • Hemoglobin 01/10/2018 13.6  12.0 - 16.0 g/dL Final   • Hematocrit 01/10/2018 40.9  37.0 - 47.0 % Final   • MCV 01/10/2018 93.8  81.0 - 99.0 fL Final   • MCH 01/10/2018 31.2* 27.0 - 31.0 pg Final   • MCHC 01/10/2018 33.3  31.0 - 37.0 g/dL Final   • RDW 01/10/2018 12.0  11.5 - 14.5 % Final   • Platelets 01/10/2018 251  140 - 500 10*3/mm3 Final   • Neutrophil Rel % 01/10/2018 60.1  45.0 - 70.0 % Final   • Lymphocyte Rel % 01/10/2018 26.7  20.0 - 45.0 % Final   • Monocyte Rel % 01/10/2018 8.0  3.0 - 8.0 % Final   • Eosinophil Rel % 01/10/2018 3.6  0.0 - 4.0 % Final   • Basophil Rel % 01/10/2018 1.4  0.0 - 2.0 % Final   • Neutrophils Absolute 01/10/2018 3.01  1.50 - 8.30 10*3/mm3 Final   • Lymphocytes Absolute 01/10/2018 1.34  0.60 - 4.80 10*3/mm3 Final   • Monocytes Absolute 01/10/2018 0.40  0.00 - 1.00 10*3/mm3 Final   • Eosinophils Absolute 01/10/2018 0.18  0.10 - 0.30 10*3/mm3 Final   • Basophils Absolute 01/10/2018 0.07  0.00 - 0.20 10*3/mm3 Final   • Immature Granulocyte Rel % 01/10/2018 0.2  0.0 - 0.5 % Final   • Immature Grans Absolute 01/10/2018 0.01  0.00 - 0.03 10*3/mm3 Final   • nRBC 01/10/2018 0.0  0.0 - 0.0 /100 WBC Final   • Glucose 01/10/2018 87  65 - 99 mg/dL Final   • BUN 01/10/2018 12  6 - 20 mg/dL Final   • Creatinine 01/10/2018 0.79  0.57 - 1.00 mg/dL Final   • eGFR Non  Am 01/10/2018 75  >60 mL/min/1.73 Final   • eGFR African Am 01/10/2018 91  >60 mL/min/1.73 Final   • BUN/Creatinine Ratio 01/10/2018 15.2  7.0 - 25.0 Final   • Sodium 01/10/2018 141  136 - 145 mmol/L Final   • Potassium 01/10/2018 4.6  3.5 - 5.2 mmol/L Final   • Chloride 01/10/2018 102  98 - 107 mmol/L Final   • Total CO2 01/10/2018 29.2* 22.0 - 29.0 mmol/L Final   • Calcium 01/10/2018 9.3  8.6 - 10.5 mg/dL Final   • Total Protein 01/10/2018 6.4  6.0 - 8.5 g/dL Final    • Albumin 01/10/2018 4.20  3.50 - 5.20 g/dL Final   • Globulin 01/10/2018 2.2  gm/dL Final   • A/G Ratio 01/10/2018 1.9  g/dL Final   • Total Bilirubin 01/10/2018 0.3  0.2 - 1.2 mg/dL Final   • Alkaline Phosphatase 01/10/2018 58  40 - 129 U/L Final   • AST (SGOT) 01/10/2018 19  5 - 32 U/L Final   • ALT (SGPT) 01/10/2018 24  5 - 33 U/L Final   • Total Cholesterol 01/10/2018 195  0 - 200 mg/dL Final   • Triglycerides 01/10/2018 176* 0 - 150 mg/dL Final   • HDL Cholesterol 01/10/2018 60  40 - 60 mg/dL Final   • VLDL Cholesterol 01/10/2018 35.2* 7 - 27 mg/dL Final   • LDL Cholesterol  01/10/2018 100  0 - 100 mg/dL Final   • LDL/HDL Ratio 01/10/2018 1.66   Final   • TSH 01/10/2018 1.100  0.270 - 4.200 mIU/mL Final   • 25 Hydroxy, Vitamin D 01/10/2018 31.3  ng/mL Final    Comment: Reference Range for Total Vitamin D 25(OH)  Deficiency    <20.0 ng/mL  Insufficiency 21-29 ng/mL  Sufficiency   30-74 ng/mL     • Hep C Virus Ab 01/10/2018 <0.1  0.0 - 0.9 s/co ratio Final    Comment:                                   Negative:     < 0.8                               Indeterminate: 0.8 - 0.9                                    Positive:     > 0.9   The CDC recommends that a positive HCV antibody result   be followed up with a HCV Nucleic Acid Amplification   test ([id number]).       Dragon transcription disclaimer    Much of this encounter note is an electronic transcription/translation of spoken language to printed text.  The electronic translation of spoken language may permit erroneous, or at times, nonsensical words or phrases to be inadvertently transcribed.  Although I have reviewed the note for such errors, some may still exist.    Sari Yanes PA-C  Family Practice

## 2018-01-18 LAB
CONV .: NORMAL
CYTOLOGIST CVX/VAG CYTO: NORMAL
CYTOLOGY CVX/VAG DOC THIN PREP: NORMAL
DX ICD CODE: NORMAL
HIV 1 & 2 AB SER-IMP: NORMAL
OTHER STN SPEC: NORMAL
PATH REPORT.FINAL DX SPEC: NORMAL
STAT OF ADQ CVX/VAG CYTO-IMP: NORMAL

## 2018-01-19 ENCOUNTER — TELEPHONE (OUTPATIENT)
Dept: FAMILY MEDICINE CLINIC | Facility: CLINIC | Age: 58
End: 2018-01-19

## 2018-01-19 DIAGNOSIS — E78.2 MIXED HYPERLIPIDEMIA: ICD-10-CM

## 2018-01-19 RX ORDER — PRAVASTATIN SODIUM 20 MG
20 TABLET ORAL DAILY
Qty: 90 TABLET | Refills: 3 | Status: SHIPPED | OUTPATIENT
Start: 2018-01-19 | End: 2019-05-26 | Stop reason: SDUPTHER

## 2018-01-19 NOTE — TELEPHONE ENCOUNTER
----- Message from Sari Yanes PA-C sent at 1/19/2018  4:44 AM EST -----  Notify patient that PAP was normal.  Plan to follow up in 1 year.

## 2018-05-24 ENCOUNTER — HOSPITAL ENCOUNTER (OUTPATIENT)
Dept: GENERAL RADIOLOGY | Facility: HOSPITAL | Age: 58
Discharge: HOME | End: 2018-05-24
Admitting: PHYSICIAN ASSISTANT

## 2018-05-24 ENCOUNTER — OFFICE VISIT (OUTPATIENT)
Dept: FAMILY MEDICINE CLINIC | Facility: CLINIC | Age: 58
End: 2018-05-24

## 2018-05-24 VITALS
BODY MASS INDEX: 31.65 KG/M2 | DIASTOLIC BLOOD PRESSURE: 76 MMHG | TEMPERATURE: 98.5 F | OXYGEN SATURATION: 98 % | RESPIRATION RATE: 16 BRPM | WEIGHT: 190 LBS | HEIGHT: 65 IN | HEART RATE: 83 BPM | SYSTOLIC BLOOD PRESSURE: 120 MMHG

## 2018-05-24 DIAGNOSIS — N81.6 CYSTOCELE WITH RECTOCELE: ICD-10-CM

## 2018-05-24 DIAGNOSIS — M79.671 ACUTE FOOT PAIN, RIGHT: ICD-10-CM

## 2018-05-24 DIAGNOSIS — N81.10 CYSTOCELE WITH RECTOCELE: ICD-10-CM

## 2018-05-24 DIAGNOSIS — Z01.818 PRE-OPERATIVE CLEARANCE: Primary | ICD-10-CM

## 2018-05-24 PROBLEM — N99.3 VAGINAL VAULT PROLAPSE AFTER HYSTERECTOMY: Status: ACTIVE | Noted: 2018-02-09

## 2018-05-24 PROBLEM — N39.46 MIXED STRESS AND URGE URINARY INCONTINENCE: Status: ACTIVE | Noted: 2018-02-09

## 2018-05-24 PROBLEM — N39.3 GENUINE STRESS INCONTINENCE, FEMALE: Status: ACTIVE | Noted: 2018-03-27

## 2018-05-24 PROBLEM — N36.41 HYPERMOBILITY OF URETHRA: Status: ACTIVE | Noted: 2018-02-09

## 2018-05-24 PROBLEM — N32.89 DECREASED BLADDER CAPACITY: Status: ACTIVE | Noted: 2018-03-27

## 2018-05-24 PROBLEM — N81.11 MIDLINE CYSTOCELE: Status: ACTIVE | Noted: 2018-02-09

## 2018-05-24 PROBLEM — R39.15 URINARY URGENCY: Status: ACTIVE | Noted: 2018-03-27

## 2018-05-24 PROBLEM — N81.9 VAGINAL VAULT PROLAPSE: Status: ACTIVE | Noted: 2018-04-16

## 2018-05-24 LAB
ALBUMIN SERPL-MCNC: 4.4 G/DL (ref 3.5–5.2)
ALBUMIN/GLOB SERPL: 1.7 G/DL
ALP SERPL-CCNC: 54 U/L (ref 40–129)
ALT SERPL-CCNC: 23 U/L (ref 5–33)
APTT PPP: 30.3 SECONDS (ref 24.3–38.1)
AST SERPL-CCNC: 18 U/L (ref 5–32)
BASOPHILS # BLD AUTO: 0.07 10*3/MM3 (ref 0–0.2)
BASOPHILS NFR BLD AUTO: 1.3 % (ref 0–2)
BILIRUB SERPL-MCNC: 0.4 MG/DL (ref 0.2–1.2)
BUN SERPL-MCNC: 13 MG/DL (ref 6–20)
BUN/CREAT SERPL: 17.3 (ref 7–25)
CALCIUM SERPL-MCNC: 9.6 MG/DL (ref 8.6–10.5)
CHLORIDE SERPL-SCNC: 101 MMOL/L (ref 98–107)
CO2 SERPL-SCNC: 28.8 MMOL/L (ref 22–29)
CREAT SERPL-MCNC: 0.75 MG/DL (ref 0.57–1)
EOSINOPHIL # BLD AUTO: 0.19 10*3/MM3 (ref 0.1–0.3)
EOSINOPHIL NFR BLD AUTO: 3.4 % (ref 0–4)
ERYTHROCYTE [DISTWIDTH] IN BLOOD BY AUTOMATED COUNT: 12.2 % (ref 11.5–14.5)
GFR SERPLBLD CREATININE-BSD FMLA CKD-EPI: 79 ML/MIN/1.73
GFR SERPLBLD CREATININE-BSD FMLA CKD-EPI: 96 ML/MIN/1.73
GLOBULIN SER CALC-MCNC: 2.6 GM/DL
GLUCOSE SERPL-MCNC: 89 MG/DL (ref 65–99)
HCT VFR BLD AUTO: 42.6 % (ref 37–47)
HGB BLD-MCNC: 14.3 G/DL (ref 12–16)
IMM GRANULOCYTES # BLD: 0 10*3/MM3 (ref 0–0.03)
IMM GRANULOCYTES NFR BLD: 0 % (ref 0–0.5)
INR PPP: 0.99 (ref 0.9–1.1)
LYMPHOCYTES # BLD AUTO: 1.47 10*3/MM3 (ref 0.6–4.8)
LYMPHOCYTES NFR BLD AUTO: 26.5 % (ref 20–45)
MCH RBC QN AUTO: 31.7 PG (ref 27–31)
MCHC RBC AUTO-ENTMCNC: 33.6 G/DL (ref 31–37)
MCV RBC AUTO: 94.5 FL (ref 81–99)
MONOCYTES # BLD AUTO: 0.48 10*3/MM3 (ref 0–1)
MONOCYTES NFR BLD AUTO: 8.7 % (ref 3–8)
NEUTROPHILS # BLD AUTO: 3.33 10*3/MM3 (ref 1.5–8.3)
NEUTROPHILS NFR BLD AUTO: 60.1 % (ref 45–70)
NRBC BLD AUTO-RTO: 0 /100 WBC (ref 0–0)
PLATELET # BLD AUTO: 268 10*3/MM3 (ref 140–500)
POTASSIUM SERPL-SCNC: 4.3 MMOL/L (ref 3.5–5.2)
PROT SERPL-MCNC: 7 G/DL (ref 6–8.5)
PROTHROMBIN TIME: 13.1 SECONDS (ref 12.1–15)
RBC # BLD AUTO: 4.51 10*6/MM3 (ref 4.2–5.4)
SODIUM SERPL-SCNC: 141 MMOL/L (ref 136–145)
WBC # BLD AUTO: 5.54 10*3/MM3 (ref 4.8–10.8)

## 2018-05-24 PROCEDURE — 93000 ELECTROCARDIOGRAM COMPLETE: CPT | Performed by: PHYSICIAN ASSISTANT

## 2018-05-24 PROCEDURE — 73630 X-RAY EXAM OF FOOT: CPT

## 2018-05-24 PROCEDURE — 99214 OFFICE O/P EST MOD 30 MIN: CPT | Performed by: PHYSICIAN ASSISTANT

## 2018-05-24 NOTE — PROGRESS NOTES
Subjective   Nesha Kee is a 57 y.o. female.   Chief Complaint   Patient presents with   • PRESURGICAL CLEARANCE       History of Present Illness     Nesha is a 57-year-old female who presents for presurgical clearance Cystocele and rectocele repair.  She is due for surgery on June 27, 2018 at [facility] in [facility].  Surgeon will be .  Nesha states she is feeling well at today's office visit.  She has noticed right foot pain off and on for the past weeks.  States she has changed shoes and inserts recently.  She has been having a dull ache on top of right foot for the past 2 weeks.  States the pain worsens with walking or jogging.  States for the last few weeks she has been jogging in place barefoot to get her stepson.  Denied any recent injury or trauma to foot.  Nesha's appetite and sleep have been normal.  She denied any chest pain, shortness of air, dizziness, vision changes, or upper respiratory symptoms.    The following portions of the patient's history were reviewed and updated as appropriate: allergies, current medications, past family history, past medical history, past social history and past surgical history.    Review of Systems   Constitutional: Negative.    HENT: Negative.    Eyes: Negative.    Respiratory: Negative.  Negative for cough, shortness of breath and wheezing.    Cardiovascular: Negative.  Negative for chest pain, palpitations and leg swelling.   Gastrointestinal: Negative.    Endocrine: Negative.    Genitourinary: Negative.    Musculoskeletal: Negative.         Right foot pain   Skin: Negative.    Allergic/Immunologic: Negative.    Neurological: Negative.    Hematological: Negative.    Psychiatric/Behavioral: Negative.    All other systems reviewed and are negative.      Social History   Substance Use Topics   • Smoking status: Never Smoker   • Smokeless tobacco: Not on file   • Alcohol use No     Vitals:    05/24/18 1001   BP: 120/76   BP  "Location: Left arm   Patient Position: Sitting   Cuff Size: Adult   Pulse: 83   Resp: 16   Temp: 98.5 °F (36.9 °C)   TempSrc: Oral   SpO2: 98%   Weight: 86.2 kg (190 lb)   Height: 165.1 cm (65\")       Wt Readings from Last 3 Encounters:   05/24/18 86.2 kg (190 lb)   01/17/18 81.6 kg (180 lb)   07/12/17 81.6 kg (180 lb)       BP Readings from Last 3 Encounters:   05/24/18 120/76   04/06/18 140/90   01/17/18 120/72     Body mass index is 31.62 kg/m².  Allergies   Allergen Reactions   • Iodinated Diagnostic Agents Rash   • Other Rash     Iv contrast   • Sulfa Antibiotics Rash       Objective   Physical Exam   Constitutional: She is oriented to person, place, and time. Vital signs are normal. She appears well-developed and well-nourished.   HENT:   Head: Normocephalic and atraumatic.   Right Ear: Hearing, tympanic membrane, external ear and ear canal normal.   Left Ear: Hearing, tympanic membrane, external ear and ear canal normal.   Nose: Nose normal. Right sinus exhibits no maxillary sinus tenderness and no frontal sinus tenderness. Left sinus exhibits no maxillary sinus tenderness and no frontal sinus tenderness.   Mouth/Throat: Uvula is midline, oropharynx is clear and moist and mucous membranes are normal.   Eyes: Conjunctivae, EOM and lids are normal. Pupils are equal, round, and reactive to light.   Neck: Trachea normal and phonation normal. Neck supple. Carotid bruit is not present. No edema present. No thyromegaly present.   Cardiovascular: Normal rate, regular rhythm, S1 normal, S2 normal, normal heart sounds and normal pulses.    Pulmonary/Chest: Effort normal and breath sounds normal.   Abdominal: Soft. Normal appearance, normal aorta and bowel sounds are normal. There is no hepatomegaly. There is no tenderness.   Musculoskeletal:        Right foot: There is normal range of motion, no swelling, normal capillary refill and no crepitus.        Lymphadenopathy:     She has no cervical adenopathy. "   Neurological: She is alert and oriented to person, place, and time.   Skin: Skin is warm, dry and intact. Capillary refill takes less than 2 seconds.   Psychiatric: She has a normal mood and affect. Her speech is normal and behavior is normal. Judgment and thought content normal. Cognition and memory are normal.           ECG 12 Lead  Date/Time: 5/24/2018 10:23 AM  Performed by: ALYSON ROSE  Authorized by: ALYSON ROSE   Comparison: compared with previous ECG from 12/22/2015  Similar to previous ECG  Rhythm: sinus rhythm  Rate: normal  BPM: 64  Conduction: conduction normal  ST Segments: ST segments normal  T Waves: T waves normal  QRS axis: normal  Clinical impression: normal ECG  Comments: Indication: Presurgical clearance for rectocele/cystocele on June 27, 2018  P/SC:  104/132 ms  QRS:  70 ms  QT/QTc:  424/437 ms            Assessment/Plan   Nesha was seen today for presurgical clearance.    Diagnoses and all orders for this visit:    Pre-operative clearance  -     ECG 12 Lead  -     Protime-INR  -     APTT  -     CBC & Differential  -     Comprehensive Metabolic Panel    Cystocele with rectocele  -     Protime-INR  -     APTT  -     CBC & Differential  -     Comprehensive Metabolic Panel    Acute foot pain, right  -     XR Foot 3+ View Right; Future    1.  New Preoperative surgical clearance for rectocele and cystocele: Nesha had an in office EKG performed at today's office visit which was similar to EKG on December 25, 2015 at her previous primary care office.  EKG showed normal sinus rhythm at today's office visit.  Nesha will have a CBC, CMP, pro time/INR and APTT blood work collected for her upcoming surgery.  I will send a clearance letter after lab work is completed.  2.  New acute right foot pain: Nesha will have a right foot x-ray to [facility] today to rule out possible fracture.  She'll be notified of test results when completed.

## 2018-05-25 PROBLEM — M79.671 ACUTE FOOT PAIN, RIGHT: Status: ACTIVE | Noted: 2018-05-25

## 2018-06-14 ENCOUNTER — PREP FOR SURGERY (OUTPATIENT)
Dept: OTHER | Facility: HOSPITAL | Age: 58
End: 2018-06-14

## 2018-06-14 DIAGNOSIS — K51.90 ULCERATIVE COLITIS (HCC): Primary | ICD-10-CM

## 2018-06-18 ENCOUNTER — TELEPHONE (OUTPATIENT)
Dept: GASTROENTEROLOGY | Facility: CLINIC | Age: 58
End: 2018-06-18

## 2018-06-18 NOTE — TELEPHONE ENCOUNTER
SHE LEFT MESSAGE ON MY VOICE MAL.  RECEIVED CALL FROM PHARMACY ABOUT OSMO PILLS.  SHE NOT AWARE OF ANY PROCEDURES SCHEDULED.  CALLED AND LEFT MESSAGE ON HER VOICE MAIL.

## 2018-06-19 ENCOUNTER — PREP FOR SURGERY (OUTPATIENT)
Dept: OTHER | Facility: HOSPITAL | Age: 58
End: 2018-06-19

## 2018-06-19 DIAGNOSIS — K51.00 ULCERATIVE PANCOLITIS WITHOUT COMPLICATION (HCC): Primary | ICD-10-CM

## 2018-07-03 ENCOUNTER — TELEPHONE (OUTPATIENT)
Dept: GASTROENTEROLOGY | Facility: CLINIC | Age: 58
End: 2018-07-03

## 2018-07-03 NOTE — TELEPHONE ENCOUNTER
----- Message from Jonathan Devine MD sent at 6/14/2018  3:35 PM EDT -----  This is a Fast track for history of UC so screened every three years sent in [facility]  ----- Message -----  From: Bekah Mccloud  Sent: 6/14/2018  11:09 AM  To: Jonathan Devine MD

## 2018-07-09 DIAGNOSIS — E78.2 MIXED HYPERLIPIDEMIA: Primary | ICD-10-CM

## 2018-07-09 DIAGNOSIS — E55.9 VITAMIN D DEFICIENCY: ICD-10-CM

## 2018-07-09 DIAGNOSIS — E06.5 CHRONIC THYROIDITIS: ICD-10-CM

## 2018-07-11 LAB
25(OH)D3+25(OH)D2 SERPL-MCNC: 22.5 NG/ML
ALBUMIN SERPL-MCNC: 4.2 G/DL (ref 3.5–5.2)
ALBUMIN/GLOB SERPL: 1.7 G/DL
ALP SERPL-CCNC: 66 U/L (ref 40–129)
ALT SERPL-CCNC: 24 U/L (ref 5–33)
AST SERPL-CCNC: 15 U/L (ref 5–32)
BASOPHILS # BLD AUTO: 0.1 10*3/MM3 (ref 0–0.2)
BASOPHILS NFR BLD AUTO: 1.7 % (ref 0–2)
BILIRUB SERPL-MCNC: 0.4 MG/DL (ref 0.2–1.2)
BUN SERPL-MCNC: 16 MG/DL (ref 6–20)
BUN/CREAT SERPL: 21.3 (ref 7–25)
CALCIUM SERPL-MCNC: 9.4 MG/DL (ref 8.6–10.5)
CHLORIDE SERPL-SCNC: 105 MMOL/L (ref 98–107)
CHOLEST SERPL-MCNC: 191 MG/DL (ref 0–200)
CO2 SERPL-SCNC: 28.2 MMOL/L (ref 22–29)
CREAT SERPL-MCNC: 0.75 MG/DL (ref 0.57–1)
EOSINOPHIL # BLD AUTO: 0.46 10*3/MM3 (ref 0.1–0.3)
EOSINOPHIL NFR BLD AUTO: 7.7 % (ref 0–4)
ERYTHROCYTE [DISTWIDTH] IN BLOOD BY AUTOMATED COUNT: 12.2 % (ref 11.5–14.5)
GLOBULIN SER CALC-MCNC: 2.5 GM/DL
GLUCOSE SERPL-MCNC: 98 MG/DL (ref 65–99)
HCT VFR BLD AUTO: 41.5 % (ref 37–47)
HDLC SERPL-MCNC: 55 MG/DL (ref 40–60)
HGB BLD-MCNC: 13.5 G/DL (ref 12–16)
IMM GRANULOCYTES # BLD: 0.02 10*3/MM3 (ref 0–0.03)
IMM GRANULOCYTES NFR BLD: 0.3 % (ref 0–0.5)
LDLC SERPL CALC-MCNC: 89 MG/DL (ref 0–100)
LDLC/HDLC SERPL: 1.61 {RATIO}
LYMPHOCYTES # BLD AUTO: 1.36 10*3/MM3 (ref 0.6–4.8)
LYMPHOCYTES NFR BLD AUTO: 22.8 % (ref 20–45)
MCH RBC QN AUTO: 31.4 PG (ref 27–31)
MCHC RBC AUTO-ENTMCNC: 32.5 G/DL (ref 31–37)
MCV RBC AUTO: 96.5 FL (ref 81–99)
MONOCYTES # BLD AUTO: 0.44 10*3/MM3 (ref 0–1)
MONOCYTES NFR BLD AUTO: 7.4 % (ref 3–8)
NEUTROPHILS # BLD AUTO: 3.59 10*3/MM3 (ref 1.5–8.3)
NEUTROPHILS NFR BLD AUTO: 60.1 % (ref 45–70)
NRBC BLD AUTO-RTO: 0 /100 WBC (ref 0–0)
PLATELET # BLD AUTO: 302 10*3/MM3 (ref 140–500)
POTASSIUM SERPL-SCNC: 4.5 MMOL/L (ref 3.5–5.2)
PROT SERPL-MCNC: 6.7 G/DL (ref 6–8.5)
RBC # BLD AUTO: 4.3 10*6/MM3 (ref 4.2–5.4)
SODIUM SERPL-SCNC: 145 MMOL/L (ref 136–145)
TRIGL SERPL-MCNC: 236 MG/DL (ref 0–150)
TSH SERPL DL<=0.005 MIU/L-ACNC: 1.81 MIU/ML (ref 0.27–4.2)
VLDLC SERPL CALC-MCNC: 47.2 MG/DL (ref 7–27)
WBC # BLD AUTO: 5.97 10*3/MM3 (ref 4.8–10.8)

## 2018-07-18 ENCOUNTER — OFFICE VISIT (OUTPATIENT)
Dept: FAMILY MEDICINE CLINIC | Facility: CLINIC | Age: 58
End: 2018-07-18

## 2018-07-18 VITALS
SYSTOLIC BLOOD PRESSURE: 122 MMHG | TEMPERATURE: 97.9 F | HEIGHT: 65 IN | RESPIRATION RATE: 16 BRPM | WEIGHT: 187 LBS | HEART RATE: 85 BPM | DIASTOLIC BLOOD PRESSURE: 82 MMHG | BODY MASS INDEX: 31.16 KG/M2 | OXYGEN SATURATION: 98 %

## 2018-07-18 DIAGNOSIS — E55.9 VITAMIN D INSUFFICIENCY: ICD-10-CM

## 2018-07-18 DIAGNOSIS — E78.2 MIXED HYPERLIPIDEMIA: Primary | ICD-10-CM

## 2018-07-18 PROCEDURE — 99213 OFFICE O/P EST LOW 20 MIN: CPT | Performed by: PHYSICIAN ASSISTANT

## 2018-07-18 RX ORDER — CLINDAMYCIN HYDROCHLORIDE 300 MG/1
300 CAPSULE ORAL 4 TIMES DAILY
COMMUNITY
End: 2018-09-18

## 2018-07-18 RX ORDER — DOCUSATE SODIUM 100 MG/1
100 CAPSULE, LIQUID FILLED ORAL 2 TIMES DAILY
COMMUNITY
End: 2018-09-18

## 2018-07-18 NOTE — PROGRESS NOTES
Subjective   Nesha Kee is a 57 y.o. female.   Chief Complaint   Patient presents with   • Hyperlipidemia     management       History of Present Illness     Nesha is a 57-year-old female who presents for hyperlipidemia management.  She has gained 3 pounds since May 24, 2018. She is doing well.  She recently had incontinence surgery.  She had a reaction to the Monocryl suture reaction.  She is on Antibiotic.  She has wound care follow up with Dr. Ruelas this week.  States she has not been physically active due to recent surgery.  She is not clear to work at this time.  We'll see surgeon this week.  Giovany denied any chest pain, shortness of air, dizziness, vision changes or swelling of ankles.  Her diet has been slightly healthy and sleep has been normal.  She has been compliant with her pravastatin medication.  Denied any muscle aches.    The following portions of the patient's history were reviewed and updated as appropriate: allergies, current medications, past family history, past medical history, past social history and past surgical history.    Review of Systems   Constitutional: Negative.    HENT: Negative.    Eyes: Negative.    Respiratory: Negative.  Negative for cough, shortness of breath and wheezing.    Cardiovascular: Negative.  Negative for chest pain, palpitations and leg swelling.   Gastrointestinal: Negative.    Endocrine: Negative.    Genitourinary: Negative.    Musculoskeletal: Negative.  Negative for myalgias.   Skin: Negative.    Allergic/Immunologic: Negative.    Neurological: Negative.    Hematological: Negative.    Psychiatric/Behavioral: Negative.    All other systems reviewed and are negative.      Social History   Substance Use Topics   • Smoking status: Never Smoker   • Smokeless tobacco: Not on file   • Alcohol use No     Vitals:    07/18/18 0749   BP: 122/82   BP Location: Right arm   Patient Position: Sitting   Cuff Size: Adult   Pulse: 85   Resp: 16   Temp: 97.9 °F (36.6 °C)  "  TempSrc: Oral   SpO2: 98%   Weight: 84.8 kg (187 lb)   Height: 165.1 cm (65\")       Wt Readings from Last 3 Encounters:   07/18/18 84.8 kg (187 lb)   05/24/18 86.2 kg (190 lb)   01/17/18 81.6 kg (180 lb)       BP Readings from Last 3 Encounters:   07/18/18 122/82   05/24/18 120/76   04/06/18 140/90     Body mass index is 31.12 kg/m².  Allergies   Allergen Reactions   • Iodinated Diagnostic Agents Rash   • Other Rash     Iv contrast   • Sulfa Antibiotics Rash       Objective   Physical Exam   Constitutional: She is oriented to person, place, and time. Vital signs are normal. She appears well-developed and well-nourished.   Neck: Trachea normal and phonation normal. Neck supple. Carotid bruit is not present. No edema present. No thyromegaly present.   Cardiovascular: Normal rate, regular rhythm, S1 normal, S2 normal, normal heart sounds and normal pulses.    Pulmonary/Chest: Effort normal and breath sounds normal.   Abdominal: Soft. Normal appearance, normal aorta and bowel sounds are normal. There is no hepatomegaly. There is no tenderness.   Neurological: She is alert and oriented to person, place, and time.   Skin: Skin is warm, dry and intact. Capillary refill takes less than 2 seconds.   Psychiatric: She has a normal mood and affect. Her speech is normal and behavior is normal. Judgment and thought content normal. Cognition and memory are normal.       Assessment/Plan   Nesha was seen today for hyperlipidemia.    Diagnoses and all orders for this visit:    Mixed hyperlipidemia    Vitamin D insufficiency    1.  Chronic and uncontrolled hyperlipidemia: I have reviewed Nesha's lab work with her at today's office visit.  Her triglycerides have increased from 6 months ago.  I suspect this is dietary in nature.  She was instructed to decrease her carbohydrates in diet.  She will increase her physical activity after she is cleared by her surgeon.  She will continue her pravastatin at home.  Continue will return to " office in 6 months for fasting blood work.  2.  New vitamin D insufficiency: I have reviewed her lab work with her at today's office visit.  Her vitamin D level was slightly decreased.  We'll start over-the-counter vitamin D 3 2000 international units daily.  Plan to repeat vitamin D blood work in 6 months.            Orders Only on 07/09/2018   Component Date Value Ref Range Status   • WBC 07/11/2018 5.97  4.80 - 10.80 10*3/mm3 Final   • RBC 07/11/2018 4.30  4.20 - 5.40 10*6/mm3 Final   • Hemoglobin 07/11/2018 13.5  12.0 - 16.0 g/dL Final   • Hematocrit 07/11/2018 41.5  37.0 - 47.0 % Final   • MCV 07/11/2018 96.5  81.0 - 99.0 fL Final   • MCH 07/11/2018 31.4* 27.0 - 31.0 pg Final   • MCHC 07/11/2018 32.5  31.0 - 37.0 g/dL Final   • RDW 07/11/2018 12.2  11.5 - 14.5 % Final   • Platelets 07/11/2018 302  140 - 500 10*3/mm3 Final   • Neutrophil Rel % 07/11/2018 60.1  45.0 - 70.0 % Final   • Lymphocyte Rel % 07/11/2018 22.8  20.0 - 45.0 % Final   • Monocyte Rel % 07/11/2018 7.4  3.0 - 8.0 % Final   • Eosinophil Rel % 07/11/2018 7.7* 0.0 - 4.0 % Final   • Basophil Rel % 07/11/2018 1.7  0.0 - 2.0 % Final   • Neutrophils Absolute 07/11/2018 3.59  1.50 - 8.30 10*3/mm3 Final   • Lymphocytes Absolute 07/11/2018 1.36  0.60 - 4.80 10*3/mm3 Final   • Monocytes Absolute 07/11/2018 0.44  0.00 - 1.00 10*3/mm3 Final   • Eosinophils Absolute 07/11/2018 0.46* 0.10 - 0.30 10*3/mm3 Final   • Basophils Absolute 07/11/2018 0.10  0.00 - 0.20 10*3/mm3 Final   • Immature Granulocyte Rel % 07/11/2018 0.3  0.0 - 0.5 % Final   • Immature Grans Absolute 07/11/2018 0.02  0.00 - 0.03 10*3/mm3 Final   • nRBC 07/11/2018 0.0  0.0 - 0.0 /100 WBC Final   • Glucose 07/11/2018 98  65 - 99 mg/dL Final   • BUN 07/11/2018 16  6 - 20 mg/dL Final   • Creatinine 07/11/2018 0.75  0.57 - 1.00 mg/dL Final   • eGFR Non  Am 07/11/2018 79  >60 mL/min/1.73 Final   • eGFR African Am 07/11/2018 96  >60 mL/min/1.73 Final   • BUN/Creatinine Ratio 07/11/2018 21.3   7.0 - 25.0 Final   • Sodium 07/11/2018 145  136 - 145 mmol/L Final   • Potassium 07/11/2018 4.5  3.5 - 5.2 mmol/L Final   • Chloride 07/11/2018 105  98 - 107 mmol/L Final   • Total CO2 07/11/2018 28.2  22.0 - 29.0 mmol/L Final   • Calcium 07/11/2018 9.4  8.6 - 10.5 mg/dL Final   • Total Protein 07/11/2018 6.7  6.0 - 8.5 g/dL Final   • Albumin 07/11/2018 4.20  3.50 - 5.20 g/dL Final   • Globulin 07/11/2018 2.5  gm/dL Final   • A/G Ratio 07/11/2018 1.7  g/dL Final   • Total Bilirubin 07/11/2018 0.4  0.2 - 1.2 mg/dL Final   • Alkaline Phosphatase 07/11/2018 66  40 - 129 U/L Final   • AST (SGOT) 07/11/2018 15  5 - 32 U/L Final   • ALT (SGPT) 07/11/2018 24  5 - 33 U/L Final   • Total Cholesterol 07/11/2018 191  0 - 200 mg/dL Final   • Triglycerides 07/11/2018 236* 0 - 150 mg/dL Final   • HDL Cholesterol 07/11/2018 55  40 - 60 mg/dL Final   • VLDL Cholesterol 07/11/2018 47.2* 7 - 27 mg/dL Final   • LDL Cholesterol  07/11/2018 89  0 - 100 mg/dL Final   • LDL/HDL Ratio 07/11/2018 1.61   Final   • 25 Hydroxy, Vitamin D 07/11/2018 22.5  ng/ml Final    Comment: Reference Range for Total Vitamin D 25(OH)  Deficiency    <20.0 ng/mL  Insufficiency 21-29 ng/mL  Sufficiency   30-74 ng/mL     • TSH 07/11/2018 1.810  0.270 - 4.200 mIU/mL Final

## 2018-10-15 RX ORDER — MESALAMINE 1.2 G/1
1200 TABLET, DELAYED RELEASE ORAL
Qty: 90 TABLET | Refills: 3 | Status: SHIPPED | OUTPATIENT
Start: 2018-10-15 | End: 2019-12-04 | Stop reason: SDUPTHER

## 2018-11-30 ENCOUNTER — TELEPHONE (OUTPATIENT)
Dept: GASTROENTEROLOGY | Facility: CLINIC | Age: 58
End: 2018-11-30

## 2018-11-30 NOTE — TELEPHONE ENCOUNTER
HAVE NOT RECEIVED HER INSTRUCTIONS FOR HER PROCEDURE ON 12/17/2018.  EXPLAINED WAS MAILED TO 06/2018.  SHE WILL CHECK HER DESK.  DID E-MAIL TO HER JUST TO BE SAFE.  EMAIL [email or web address] TODAY.

## 2018-12-17 ENCOUNTER — OUTSIDE FACILITY SERVICE (OUTPATIENT)
Dept: GASTROENTEROLOGY | Facility: CLINIC | Age: 58
End: 2018-12-17

## 2018-12-17 PROCEDURE — 88305 TISSUE EXAM BY PATHOLOGIST: CPT | Performed by: INTERNAL MEDICINE

## 2018-12-17 PROCEDURE — 45380 COLONOSCOPY AND BIOPSY: CPT | Performed by: INTERNAL MEDICINE

## 2018-12-18 ENCOUNTER — LAB REQUISITION (OUTPATIENT)
Dept: LAB | Facility: HOSPITAL | Age: 58
End: 2018-12-18

## 2018-12-18 DIAGNOSIS — K51.00 ULCERATIVE CHRONIC PANCOLITIS WITHOUT COMPLICATIONS (HCC): ICD-10-CM

## 2018-12-19 LAB
CYTO UR: NORMAL
LAB AP CASE REPORT: NORMAL
LAB AP CLINICAL INFORMATION: NORMAL
LAB AP DIAGNOSIS COMMENT: NORMAL
PATH REPORT.FINAL DX SPEC: NORMAL
PATH REPORT.GROSS SPEC: NORMAL

## 2018-12-26 ENCOUNTER — OFFICE VISIT (OUTPATIENT)
Dept: FAMILY MEDICINE CLINIC | Facility: CLINIC | Age: 58
End: 2018-12-26

## 2018-12-26 VITALS
HEIGHT: 65 IN | HEART RATE: 87 BPM | BODY MASS INDEX: 30.66 KG/M2 | OXYGEN SATURATION: 96 % | TEMPERATURE: 97.7 F | RESPIRATION RATE: 16 BRPM | DIASTOLIC BLOOD PRESSURE: 80 MMHG | SYSTOLIC BLOOD PRESSURE: 130 MMHG | WEIGHT: 184 LBS

## 2018-12-26 DIAGNOSIS — J30.89 NON-SEASONAL ALLERGIC RHINITIS DUE TO OTHER ALLERGIC TRIGGER: ICD-10-CM

## 2018-12-26 DIAGNOSIS — R10.9 FLANK PAIN: Primary | ICD-10-CM

## 2018-12-26 DIAGNOSIS — J01.90 ACUTE SINUSITIS, RECURRENCE NOT SPECIFIED, UNSPECIFIED LOCATION: ICD-10-CM

## 2018-12-26 PROBLEM — J30.9 ALLERGIC RHINITIS DUE TO ALLERGEN: Status: ACTIVE | Noted: 2018-12-26

## 2018-12-26 LAB
BILIRUB BLD-MCNC: NEGATIVE MG/DL
CLARITY, POC: CLEAR
COLOR UR: YELLOW
GLUCOSE UR STRIP-MCNC: NEGATIVE MG/DL
KETONES UR QL: NEGATIVE
LEUKOCYTE EST, POC: NEGATIVE
NITRITE UR-MCNC: NEGATIVE MG/ML
PH UR: 7.5 [PH] (ref 5–8)
PROT UR STRIP-MCNC: ABNORMAL MG/DL
RBC # UR STRIP: NEGATIVE /UL
SP GR UR: 1.01 (ref 1–1.03)
UROBILINOGEN UR QL: NORMAL

## 2018-12-26 PROCEDURE — 81002 URINALYSIS NONAUTO W/O SCOPE: CPT | Performed by: PHYSICIAN ASSISTANT

## 2018-12-26 PROCEDURE — 99214 OFFICE O/P EST MOD 30 MIN: CPT | Performed by: PHYSICIAN ASSISTANT

## 2018-12-26 RX ORDER — FLUTICASONE PROPIONATE 50 MCG
2 SPRAY, SUSPENSION (ML) NASAL DAILY
Qty: 1 BOTTLE | Refills: 12 | OUTPATIENT
Start: 2018-12-26 | End: 2019-11-03

## 2018-12-26 RX ORDER — LEVOFLOXACIN 500 MG/1
500 TABLET, FILM COATED ORAL DAILY
Qty: 10 TABLET | Refills: 0 | Status: SHIPPED | OUTPATIENT
Start: 2018-12-26 | End: 2019-01-21

## 2018-12-26 RX ORDER — LEVOCETIRIZINE DIHYDROCHLORIDE 5 MG/1
5 TABLET, FILM COATED ORAL EVERY EVENING
Qty: 30 TABLET | Refills: 6 | Status: SHIPPED | OUTPATIENT
Start: 2018-12-26 | End: 2020-02-05

## 2018-12-26 NOTE — PROGRESS NOTES
Subjective   Nesha Kee is a 58 y.o. female.     Chief Complaint   Patient presents with   • Allergies       History of Present Illness     Nesha is a 58-year-old female who presents with continual sinus issues. She was seen at Urgent Care on September 18,2018 and was prescribed  Steroids.  States she got slightly better.  On November 27,2018 and was prescribed Augmentin and steroid.  States she got better but now is sick again.  She gets sinus pressure, stuffy nose to yellow rhinorrhea,headaches,ear pressure,lightheaded/dizziness when lying down,dry cough,post nasal drip for the past few weeks. She has lost 3 pounds since July 2018.  She has been taking Zyrtec and Flonase with slight relief.  Appetite has been normal.  Sleep has been restless at times.       Nesha has been having left flank pain that started a couple of days ago.  Recently had a renal ultrasound per her urologist Dr. Perry.  She has had a history of left renal cyst.  Denied any fevers, chills, dysuria, urine frequency, urgency or hesitancy.    The following portions of the patient's history were reviewed and updated as appropriate: allergies, current medications, past family history, past medical history, past social history and past surgical history.    Review of Systems   Constitutional: Negative.  Negative for chills, fatigue and fever.   HENT: Positive for congestion, ear pain, postnasal drip, rhinorrhea, sinus pressure, sinus pain and sore throat.    Eyes: Negative.    Respiratory: Positive for cough. Negative for shortness of breath and wheezing.    Cardiovascular: Negative.  Negative for chest pain, palpitations and leg swelling.   Gastrointestinal: Negative.  Negative for abdominal pain, constipation, diarrhea, nausea and vomiting.   Endocrine: Negative.    Genitourinary: Positive for flank pain. Negative for decreased urine volume, dysuria, frequency, hematuria, pelvic pain, urgency, vaginal bleeding and vaginal discharge.   Skin:  "Negative.    Allergic/Immunologic: Negative.    Neurological: Positive for headaches.   Hematological: Negative.    Psychiatric/Behavioral: Negative.    All other systems reviewed and are negative.      Social History     Tobacco Use   • Smoking status: Never Smoker   • Smokeless tobacco: Never Used   Substance Use Topics   • Alcohol use: No   • Drug use: No       Vitals:    12/26/18 1320   BP: 130/80   Pulse: 87   Resp: 16   Temp: 97.7 °F (36.5 °C)   TempSrc: Oral   SpO2: 96%   Weight: 83.5 kg (184 lb)   Height: 165.1 cm (65\")       Wt Readings from Last 3 Encounters:   12/26/18 83.5 kg (184 lb)   07/18/18 84.8 kg (187 lb)   05/24/18 86.2 kg (190 lb)       BP Readings from Last 3 Encounters:   12/26/18 130/80   11/27/18 140/84   09/18/18 149/92       Allergies   Allergen Reactions   • Iodinated Diagnostic Agents Rash   • Other Rash     Iv contrast   • Sulfa Antibiotics Rash       Body mass index is 30.62 kg/m².  Objective   Physical Exam   Constitutional: She is oriented to person, place, and time. Vital signs are normal. She appears well-developed and well-nourished.   HENT:   Head: Normocephalic and atraumatic.   Right Ear: Hearing, external ear and ear canal normal. Tympanic membrane is bulging.   Left Ear: Hearing, external ear and ear canal normal. Tympanic membrane is bulging.   Nose: Rhinorrhea present. Right sinus exhibits maxillary sinus tenderness. Right sinus exhibits no frontal sinus tenderness. Left sinus exhibits maxillary sinus tenderness. Left sinus exhibits no frontal sinus tenderness.   Mouth/Throat: Uvula is midline and mucous membranes are normal.   2+ clear drainage   Eyes: Conjunctivae, EOM and lids are normal. Pupils are equal, round, and reactive to light.   Fundoscopic exam:       The right eye shows no hemorrhage and no papilledema.        The left eye shows no hemorrhage and no papilledema.   Neck: Trachea normal and phonation normal. Neck supple. No edema present.   Cardiovascular: " Normal rate, regular rhythm, S1 normal, S2 normal, normal heart sounds and normal pulses.   Pulmonary/Chest: Effort normal and breath sounds normal.   Abdominal: Soft. Normal appearance, normal aorta and bowel sounds are normal. There is no hepatomegaly. There is no tenderness.   Lymphadenopathy:     She has no cervical adenopathy.   Neurological: She is alert and oriented to person, place, and time.   Skin: Skin is warm, dry and intact. Capillary refill takes less than 2 seconds.   Psychiatric: She has a normal mood and affect. Her speech is normal and behavior is normal. Judgment and thought content normal. Cognition and memory are normal.       Results for orders placed or performed in visit on 12/26/18   POCT urinalysis dipstick, manual   Result Value Ref Range    Color Yellow Yellow, Straw, Dark Yellow, Bonnie    Clarity, UA Clear Clear    Glucose, UA Negative Negative, 1000 mg/dL (3+) mg/dL    Bilirubin Negative Negative    Ketones, UA Negative Negative    Specific Gravity  1.015 1.005 - 1.030    Blood, UA Negative Negative    pH, Urine 7.5 5.0 - 8.0    Protein, POC Trace (A) Negative mg/dL    Urobilinogen, UA Normal Normal    Leukocytes Negative Negative    Nitrite, UA Negative Negative     Assessment/Plan   Nesha was seen today for allergies.    Diagnoses and all orders for this visit:    Flank pain  -     POCT urinalysis dipstick, manual  -     Urine Culture - Urine, Urine, Clean Catch    Non-seasonal allergic rhinitis due to other allergic trigger  -     levocetirizine (XYZAL ALLERGY 24HR) 5 MG tablet; Take 1 tablet by mouth Every Evening.  -     fluticasone (FLONASE) 50 MCG/ACT nasal spray; 2 sprays into the nostril(s) as directed by provider Daily.    Acute sinusitis, recurrence not specified, unspecified location  -     levoFLOXacin (LEVAQUIN) 500 MG tablet; Take 1 tablet by mouth Daily.  -     fluticasone (FLONASE) 50 MCG/ACT nasal spray; 2 sprays into the nostril(s) as directed by provider  Daily.      1.  New acute sinusitis: I have prescribed Levaquin and refilled her Flonase medication.  Nesha will update her status with on-call at completion of antibiotic.  We'll consider imaging of sinuses in future if warranted.  2.  Chronic allergic rhinitis: I suspect she may be immune to her Zyrtec medication.  Have given her written prescription to purchase size all over-the-counter.  She will use her Flonase daily.  3.  New left flank pain: I have reviewed her ultrasound of kidneys by Dr. Joshua Perry that was performed 10 days ago.  This showed a left renal cyst which was stable.  She will keep her follow-up appointments with Dr. Perry.  In office urinalysis showed trace protein.  Urine culture will be sent to the laboratory for further evaluation.  Nesha will be notified of urine culture report.

## 2018-12-28 LAB
BACTERIA UR CULT: NORMAL
BACTERIA UR CULT: NORMAL

## 2019-01-10 DIAGNOSIS — E55.9 VITAMIN D DEFICIENCY: ICD-10-CM

## 2019-01-10 DIAGNOSIS — Z00.00 ANNUAL PHYSICAL EXAM: Primary | ICD-10-CM

## 2019-01-16 ENCOUNTER — RESULTS ENCOUNTER (OUTPATIENT)
Dept: FAMILY MEDICINE CLINIC | Facility: CLINIC | Age: 59
End: 2019-01-16

## 2019-01-16 DIAGNOSIS — Z00.00 ANNUAL PHYSICAL EXAM: ICD-10-CM

## 2019-01-16 DIAGNOSIS — E55.9 VITAMIN D DEFICIENCY: ICD-10-CM

## 2019-01-16 LAB
25(OH)D3+25(OH)D2 SERPL-MCNC: 27.6 NG/ML
ALBUMIN SERPL-MCNC: 4.4 G/DL (ref 3.5–5.2)
ALBUMIN/GLOB SERPL: 1.6 G/DL
ALP SERPL-CCNC: 56 U/L (ref 40–129)
ALT SERPL-CCNC: 19 U/L (ref 5–33)
AST SERPL-CCNC: 18 U/L (ref 5–32)
BASOPHILS # BLD AUTO: 0.07 10*3/MM3 (ref 0–0.2)
BASOPHILS NFR BLD AUTO: 1.5 % (ref 0–2)
BILIRUB SERPL-MCNC: 0.7 MG/DL (ref 0.2–1.2)
BUN SERPL-MCNC: 13 MG/DL (ref 6–20)
BUN/CREAT SERPL: 16.9 (ref 7–25)
CALCIUM SERPL-MCNC: 9.3 MG/DL (ref 8.6–10.5)
CHLORIDE SERPL-SCNC: 103 MMOL/L (ref 98–107)
CHOLEST SERPL-MCNC: 172 MG/DL (ref 0–200)
CO2 SERPL-SCNC: 27.3 MMOL/L (ref 22–29)
CREAT SERPL-MCNC: 0.77 MG/DL (ref 0.57–1)
EOSINOPHIL # BLD AUTO: 0.15 10*3/MM3 (ref 0.1–0.3)
EOSINOPHIL NFR BLD AUTO: 3.3 % (ref 0–4)
ERYTHROCYTE [DISTWIDTH] IN BLOOD BY AUTOMATED COUNT: 12.2 % (ref 11.5–14.5)
GLOBULIN SER CALC-MCNC: 2.7 GM/DL
GLUCOSE SERPL-MCNC: 83 MG/DL (ref 65–99)
HCT VFR BLD AUTO: 42.5 % (ref 37–47)
HDLC SERPL-MCNC: 61 MG/DL (ref 40–60)
HGB BLD-MCNC: 14 G/DL (ref 12–16)
IMM GRANULOCYTES # BLD AUTO: 0.01 10*3/MM3 (ref 0–0.03)
IMM GRANULOCYTES NFR BLD AUTO: 0.2 % (ref 0–0.5)
LDLC SERPL CALC-MCNC: 75 MG/DL (ref 0–100)
LDLC/HDLC SERPL: 1.22 {RATIO}
LYMPHOCYTES # BLD AUTO: 1.25 10*3/MM3 (ref 0.6–4.8)
LYMPHOCYTES NFR BLD AUTO: 27.1 % (ref 20–45)
MCH RBC QN AUTO: 31.4 PG (ref 27–31)
MCHC RBC AUTO-ENTMCNC: 32.9 G/DL (ref 31–37)
MCV RBC AUTO: 95.3 FL (ref 81–99)
MONOCYTES # BLD AUTO: 0.38 10*3/MM3 (ref 0–1)
MONOCYTES NFR BLD AUTO: 8.2 % (ref 3–8)
NEUTROPHILS # BLD AUTO: 2.75 10*3/MM3 (ref 1.5–8.3)
NEUTROPHILS NFR BLD AUTO: 59.7 % (ref 45–70)
NRBC BLD AUTO-RTO: 0 /100 WBC (ref 0–0)
PLATELET # BLD AUTO: 243 10*3/MM3 (ref 140–500)
POTASSIUM SERPL-SCNC: 4.3 MMOL/L (ref 3.5–5.2)
PROT SERPL-MCNC: 7.1 G/DL (ref 6–8.5)
RBC # BLD AUTO: 4.46 10*6/MM3 (ref 4.2–5.4)
SODIUM SERPL-SCNC: 142 MMOL/L (ref 136–145)
TRIGL SERPL-MCNC: 182 MG/DL (ref 0–150)
VLDLC SERPL CALC-MCNC: 36.4 MG/DL (ref 7–27)
WBC # BLD AUTO: 4.61 10*3/MM3 (ref 4.8–10.8)

## 2019-01-21 ENCOUNTER — PROCEDURE VISIT (OUTPATIENT)
Dept: FAMILY MEDICINE CLINIC | Facility: CLINIC | Age: 59
End: 2019-01-21

## 2019-01-21 VITALS
OXYGEN SATURATION: 98 % | WEIGHT: 188 LBS | SYSTOLIC BLOOD PRESSURE: 132 MMHG | BODY MASS INDEX: 31.32 KG/M2 | DIASTOLIC BLOOD PRESSURE: 86 MMHG | HEIGHT: 65 IN | RESPIRATION RATE: 16 BRPM | TEMPERATURE: 97.9 F | HEART RATE: 86 BPM

## 2019-01-21 DIAGNOSIS — N81.6 RECTOCELE: ICD-10-CM

## 2019-01-21 DIAGNOSIS — E55.9 VITAMIN D INSUFFICIENCY: ICD-10-CM

## 2019-01-21 DIAGNOSIS — Z12.31 SCREENING MAMMOGRAM, ENCOUNTER FOR: ICD-10-CM

## 2019-01-21 DIAGNOSIS — E78.2 MIXED HYPERLIPIDEMIA: ICD-10-CM

## 2019-01-21 DIAGNOSIS — Z78.0 MENOPAUSE: ICD-10-CM

## 2019-01-21 DIAGNOSIS — Z01.419 PAP SMEAR, AS PART OF ROUTINE GYNECOLOGICAL EXAMINATION: ICD-10-CM

## 2019-01-21 DIAGNOSIS — Z00.00 ENCOUNTER FOR ANNUAL PHYSICAL EXAM: Primary | ICD-10-CM

## 2019-01-21 LAB
DEVELOPER EXPIRATION DATE: NORMAL
DEVELOPER LOT NUMBER: NORMAL
EXPIRATION DATE: NORMAL
FECAL OCCULT BLOOD SCREEN, POC: NEGATIVE
Lab: NORMAL
NEGATIVE CONTROL: NEGATIVE
POSITIVE CONTROL: POSITIVE
TSH SERPL DL<=0.005 MIU/L-ACNC: 1.46 MIU/ML (ref 0.27–4.2)
WRITTEN AUTHORIZATION: NORMAL

## 2019-01-21 PROCEDURE — 82270 OCCULT BLOOD FECES: CPT | Performed by: PHYSICIAN ASSISTANT

## 2019-01-21 PROCEDURE — 99396 PREV VISIT EST AGE 40-64: CPT | Performed by: PHYSICIAN ASSISTANT

## 2019-01-21 NOTE — PROGRESS NOTES
Subjective   Nesha Kee is a 58 y.o. female presents for   Chief Complaint   Patient presents with   • Annual Exam   • Gynecologic Exam       History of Present Illness     Nesha is a 58-year-old female who presents for annual physical examination with Pap smear.  She has gained 4 pounds since December 26, 2018. Bowel  movements are daily without dark black tarry stools.  Diet has been getting healthier.  Sleep has been normal.  Nesha has been walking 2-3 times a week for 2 miles.   She had a colonoscopy in 2018.  Due again in 2021.   Denied any , shortness of air, wheezing, abdominal pain, urinary symptoms, vaginal discharge, breast pain, breast discharge, or swelling of ankles.  She has seen-year-old gynecologist for her rectocele and cystocele.  She did have a cystocele repair in summer of 2018.  Gynecologist states she can't take care of rectocele in office.  Giovany is doing monthly breast examinations.  She has had a partial hysterectomy.  Still has both ovaries.  She has no complaints at office visit today.  She sees dermatology yearly for skin check.    The following portions of the patient's history were reviewed and updated as appropriate: allergies, current medications, past family history, past medical history, past social history, past surgical history and problem list.    Review of Systems   Constitutional: Negative.  Negative for chills, fatigue and fever.   HENT: Negative.    Eyes: Negative.    Respiratory: Negative.  Negative for cough, shortness of breath and wheezing.    Cardiovascular: Negative.  Negative for chest pain, palpitations and leg swelling.   Gastrointestinal: Negative.  Negative for constipation, diarrhea, nausea and vomiting.   Endocrine: Negative.    Genitourinary: Negative.  Negative for decreased urine volume, dysuria, flank pain, frequency, genital sores, hematuria, urgency, vaginal bleeding, vaginal discharge and vaginal pain.   Musculoskeletal: Negative.    Skin: Negative.   "  Allergic/Immunologic: Negative.    Neurological: Negative.    Hematological: Negative.    Psychiatric/Behavioral: Negative.  Negative for sleep disturbance and suicidal ideas.   All other systems reviewed and are negative.        Vitals:    01/21/19 0803   BP: 132/86   BP Location: Left arm   Patient Position: Sitting   Cuff Size: Adult   Pulse: 86   Resp: 16   Temp: 97.9 °F (36.6 °C)   TempSrc: Oral   SpO2: 98%   Weight: 85.3 kg (188 lb)   Height: 165.1 cm (65\")     Wt Readings from Last 3 Encounters:   01/21/19 85.3 kg (188 lb)   12/26/18 83.5 kg (184 lb)   07/18/18 84.8 kg (187 lb)       BP Readings from Last 3 Encounters:   01/21/19 132/86   12/26/18 130/80   11/27/18 140/84       Social History     Socioeconomic History   • Marital status:      Spouse name: Not on file   • Number of children: Not on file   • Years of education: Not on file   • Highest education level: Not on file   Social Needs   • Financial resource strain: Not on file   • Food insecurity - worry: Not on file   • Food insecurity - inability: Not on file   • Transportation needs - medical: Not on file   • Transportation needs - non-medical: Not on file   Occupational History   • Not on file   Tobacco Use   • Smoking status: Never Smoker   • Smokeless tobacco: Never Used   Substance and Sexual Activity   • Alcohol use: No   • Drug use: No   • Sexual activity: Defer   Other Topics Concern   • Not on file   Social History Narrative   • Not on file       Allergies   Allergen Reactions   • Iodinated Diagnostic Agents Rash   • Other Rash     Iv contrast   • Sulfa Antibiotics Rash       Body mass index is 31.28 kg/m².    Objective   Physical Exam   Constitutional: She is oriented to person, place, and time. Vital signs are normal. She appears well-developed and well-nourished.   HENT:   Head: Normocephalic and atraumatic.   Right Ear: Hearing, tympanic membrane, external ear and ear canal normal.   Left Ear: Hearing, tympanic membrane, " external ear and ear canal normal.   Nose: Nose normal. Right sinus exhibits no maxillary sinus tenderness and no frontal sinus tenderness. Left sinus exhibits no maxillary sinus tenderness and no frontal sinus tenderness.   Mouth/Throat: Uvula is midline, oropharynx is clear and moist and mucous membranes are normal.   Eyes: Conjunctivae, EOM and lids are normal. Pupils are equal, round, and reactive to light.   Neck: Trachea normal and phonation normal. Neck supple. Carotid bruit is not present. No edema present. No thyroid mass and no thyromegaly present.   Cardiovascular: Normal rate, regular rhythm, S1 normal, S2 normal, normal heart sounds and normal pulses.   Pulmonary/Chest: Effort normal and breath sounds normal. Right breast exhibits no inverted nipple, no mass, no nipple discharge, no skin change and no tenderness. Left breast exhibits no inverted nipple, no mass, no nipple discharge, no skin change and no tenderness. Breasts are symmetrical. There is no breast swelling.   Abdominal: Soft. Normal appearance, normal aorta and bowel sounds are normal. There is no hepatomegaly. There is no tenderness. Hernia confirmed negative in the right inguinal area and confirmed negative in the left inguinal area.   Genitourinary: Vagina normal. Rectal exam shows guaiac negative stool. No breast tenderness, discharge or bleeding. Pelvic exam was performed with patient supine. There is no rash, tenderness, lesion or injury on the right labia. There is no rash, tenderness, lesion or injury on the left labia. Right adnexum displays no mass, no tenderness and no fullness. Left adnexum displays no mass, no tenderness and no fullness.   Genitourinary Comments: Exam was chaperoned by Ashlee Segura RN  Naomy is absent due to partial hysterectomy.  There is a rectocele noted   Lymphadenopathy:     She has no cervical adenopathy.     She has no axillary adenopathy. No inguinal adenopathy noted on the right or left side.         Right: No inguinal adenopathy present.        Left: No inguinal adenopathy present.   Neurological: She is alert and oriented to person, place, and time.   Reflex Scores:       Patellar reflexes are 2+ on the right side and 2+ on the left side.  Skin: Skin is warm, dry and intact. Capillary refill takes less than 2 seconds.   Psychiatric: She has a normal mood and affect. Her speech is normal and behavior is normal. Judgment and thought content normal. Cognition and memory are normal.     Results for orders placed or performed in visit on 01/21/19   POC Occult Blood Stool   Result Value Ref Range    Fecal Occult Blood Negative Negative    Lot Number [id number]     Expiration Date 10,312,019     DEVELOPER LOT NUMBER [id number]     DEVELOPER EXPIRATION DATE 10,312,019     Positive Control Positive Positive    Negative Control Negative Negative     Assessment/Plan   Nesha was seen today for annual exam and gynecologic exam.    Diagnoses and all orders for this visit:    Encounter for annual physical exam  -     POC Occult Blood Stool    Pap smear, as part of routine gynecological examination  -     Pap IG, Rfx HPV All Pth    Mixed hyperlipidemia    Vitamin D insufficiency    Screening mammogram, encounter for  -     Mammo Screening Bilateral With CAD; Future    Menopause  -     Mammo Screening Bilateral With CAD; Future  -     DEXA Bone Density Axial; Future    Rectocele      1.  Annual physical examination with Pap smear: A Pap smear was collected and sent to the laboratory for further evaluation.  He has had a partial hysterectomy.  Smears nor normal will do file manual exam next year and place of Pap smear.  She voiced understanding.  She will be notified of test results when completed.  Her in office Hemoccult was negative.  I have completed her biometric form and given her the original.  This will be scanned into her electronic medical record.  2. Chronic and stable hyperlipidemia: I have reviewed Nesha's lab work with  her at office visit today.  Her cholesterol was 172, triglycerides 182, HDL 61 and LDL was 75.  This was collected on January 6 18 2019.  She will continue her current pravastatin medication at home.  Nesha will continue making healthy choices and exercising regularly.  Plan to repeat fasting lipid profile in 6 months.  3.  Chronic and improving vitamin D insufficiency: Her vitamin D level collected on Wednesday, January 16, 2019 was 27.6.  This has improved from 6 months ago.  Have instructed her to increase her vitamin D 3 intake to 4000 international units daily.  Plan to repeat vitamin D level in 6 months.  4.  Menopausal: I have placed screening mammogram and DEXA scan ordered to be performed at [facility].  Nesha will be notified of test results when completed.  5.  Chronic rectocele: I have instructed her to schedule follow-up appointment with her urogynecology, for further evaluation.  She voiced understanding.    Sari Yanes PA-C    [facility]  [address] [place] [id number]  Dept: [phone]  Dept Fax: [phone]  Loc: [phone]  Loc Fax: [phone]        Results Encounter on 01/16/2019   Component Date Value Ref Range Status   • WBC 01/16/2019 4.61* 4.80 - 10.80 10*3/mm3 Final   • RBC 01/16/2019 4.46  4.20 - 5.40 10*6/mm3 Final   • Hemoglobin 01/16/2019 14.0  12.0 - 16.0 g/dL Final   • Hematocrit 01/16/2019 42.5  37.0 - 47.0 % Final   • MCV 01/16/2019 95.3  81.0 - 99.0 fL Final   • MCH 01/16/2019 31.4* 27.0 - 31.0 pg Final   • MCHC 01/16/2019 32.9  31.0 - 37.0 g/dL Final   • RDW 01/16/2019 12.2  11.5 - 14.5 % Final   • Platelets 01/16/2019 243  140 - 500 10*3/mm3 Final   • Neutrophil Rel % 01/16/2019 59.7  45.0 - 70.0 % Final   • Lymphocyte Rel % 01/16/2019 27.1  20.0 - 45.0 % Final   • Monocyte Rel % 01/16/2019 8.2* 3.0 - 8.0 % Final   • Eosinophil Rel % 01/16/2019 3.3  0.0 - 4.0 % Final   • Basophil Rel %  01/16/2019 1.5  0.0 - 2.0 % Final   • Neutrophils Absolute 01/16/2019 2.75  1.50 - 8.30 10*3/mm3 Final   • Lymphocytes Absolute 01/16/2019 1.25  0.60 - 4.80 10*3/mm3 Final   • Monocytes Absolute 01/16/2019 0.38  0.00 - 1.00 10*3/mm3 Final   • Eosinophils Absolute 01/16/2019 0.15  0.10 - 0.30 10*3/mm3 Final   • Basophils Absolute 01/16/2019 0.07  0.00 - 0.20 10*3/mm3 Final   • Immature Granulocyte Rel % 01/16/2019 0.2  0.0 - 0.5 % Final   • Immature Grans Absolute 01/16/2019 0.01  0.00 - 0.03 10*3/mm3 Final   • nRBC 01/16/2019 0.0  0.0 - 0.0 /100 WBC Final   • Glucose 01/16/2019 83  65 - 99 mg/dL Final   • BUN 01/16/2019 13  6 - 20 mg/dL Final   • Creatinine 01/16/2019 0.77  0.57 - 1.00 mg/dL Final   • eGFR Non  Am 01/16/2019 77  >60 mL/min/1.73 Final   • eGFR African Am 01/16/2019 93  >60 mL/min/1.73 Final   • BUN/Creatinine Ratio 01/16/2019 16.9  7.0 - 25.0 Final   • Sodium 01/16/2019 142  136 - 145 mmol/L Final   • Potassium 01/16/2019 4.3  3.5 - 5.2 mmol/L Final   • Chloride 01/16/2019 103  98 - 107 mmol/L Final   • Total CO2 01/16/2019 27.3  22.0 - 29.0 mmol/L Final   • Calcium 01/16/2019 9.3  8.6 - 10.5 mg/dL Final   • Total Protein 01/16/2019 7.1  6.0 - 8.5 g/dL Final   • Albumin 01/16/2019 4.40  3.50 - 5.20 g/dL Final   • Globulin 01/16/2019 2.7  gm/dL Final   • A/G Ratio 01/16/2019 1.6  g/dL Final   • Total Bilirubin 01/16/2019 0.7  0.2 - 1.2 mg/dL Final   • Alkaline Phosphatase 01/16/2019 56  40 - 129 U/L Final   • AST (SGOT) 01/16/2019 18  5 - 32 U/L Final   • ALT (SGPT) 01/16/2019 19  5 - 33 U/L Final   • Total Cholesterol 01/16/2019 172  0 - 200 mg/dL Final   • Triglycerides 01/16/2019 182* 0 - 150 mg/dL Final   • HDL Cholesterol 01/16/2019 61* 40 - 60 mg/dL Final   • VLDL Cholesterol 01/16/2019 36.4* 7 - 27 mg/dL Final   • LDL Cholesterol  01/16/2019 75  0 - 100 mg/dL Final   • LDL/HDL Ratio 01/16/2019 1.22   Final   • 25 Hydroxy, Vitamin D 01/16/2019 27.6  ng/ml Final    Comment: Reference Range  for Total Vitamin D 25(OH)  Deficiency <20.0 ng/mL  Insufficiency 21-29 ng/mL  Sufficiency  ng/mL  Toxicity >100 ng/mL

## 2019-01-23 ENCOUNTER — APPOINTMENT (OUTPATIENT)
Dept: BONE DENSITY | Facility: HOSPITAL | Age: 59
End: 2019-01-23

## 2019-01-23 ENCOUNTER — HOSPITAL ENCOUNTER (OUTPATIENT)
Dept: MAMMOGRAPHY | Facility: HOSPITAL | Age: 59
Discharge: HOME | End: 2019-01-23
Admitting: PHYSICIAN ASSISTANT

## 2019-01-23 DIAGNOSIS — Z78.0 MENOPAUSE: ICD-10-CM

## 2019-01-23 DIAGNOSIS — Z12.31 SCREENING MAMMOGRAM, ENCOUNTER FOR: ICD-10-CM

## 2019-01-23 PROCEDURE — 77067 SCR MAMMO BI INCL CAD: CPT

## 2019-01-23 PROCEDURE — 77063 BREAST TOMOSYNTHESIS BI: CPT

## 2019-01-23 PROCEDURE — 77080 DXA BONE DENSITY AXIAL: CPT

## 2019-03-13 ENCOUNTER — OFFICE VISIT (OUTPATIENT)
Dept: FAMILY MEDICINE CLINIC | Facility: CLINIC | Age: 59
End: 2019-03-13

## 2019-03-13 VITALS
OXYGEN SATURATION: 97 % | SYSTOLIC BLOOD PRESSURE: 139 MMHG | RESPIRATION RATE: 16 BRPM | HEIGHT: 65 IN | TEMPERATURE: 97.9 F | BODY MASS INDEX: 33.15 KG/M2 | DIASTOLIC BLOOD PRESSURE: 85 MMHG | HEART RATE: 75 BPM | WEIGHT: 199 LBS

## 2019-03-13 DIAGNOSIS — E66.9 OBESITY (BMI 30.0-34.9): ICD-10-CM

## 2019-03-13 DIAGNOSIS — J06.9 ACUTE URI: Primary | ICD-10-CM

## 2019-03-13 PROBLEM — E66.811 OBESITY (BMI 30.0-34.9): Status: ACTIVE | Noted: 2019-03-13

## 2019-03-13 PROCEDURE — 99213 OFFICE O/P EST LOW 20 MIN: CPT | Performed by: PHYSICIAN ASSISTANT

## 2019-03-13 RX ORDER — METHYLPREDNISOLONE 4 MG/1
TABLET ORAL
Qty: 21 TABLET | Refills: 0 | Status: SHIPPED | OUTPATIENT
Start: 2019-03-13 | End: 2019-07-29

## 2019-03-13 RX ORDER — AZITHROMYCIN 250 MG/1
TABLET, FILM COATED ORAL
Qty: 6 TABLET | Refills: 0 | Status: SHIPPED | OUTPATIENT
Start: 2019-03-13 | End: 2019-07-29

## 2019-03-13 NOTE — PROGRESS NOTES
"Subjective   Nesha Kee is a 58 y.o. female presents for   Chief Complaint   Patient presents with   • Cough     productive green sputum   • Sore Throat     x 1 week   • Neck Pain       History of Present Illness     Nesha is a 58 year old female who presents with productive cough,sore throat,post nasal drip,head/nasal congestion,ear pressure,chest congestion,swollen glands,wheezing,fevers, and shortness of air for the past 7-10 days.  Denied any chills,body aches,headaches,nausea,vomiting or diarrhea.  She has taken OTC Dayquil/Nyquil.  Appetite has been normal.  Sleep has been normal.      The following portions of the patient's history were reviewed and updated as appropriate: allergies, current medications, past family history, past medical history, past social history, past surgical history and problem list.    Review of Systems   Constitutional: Positive for fever. Negative for chills and fatigue.   HENT: Positive for congestion, ear pain, postnasal drip, rhinorrhea, sinus pressure, sinus pain and sore throat.    Eyes: Negative.    Respiratory: Positive for cough. Negative for shortness of breath and wheezing.    Cardiovascular: Negative.  Negative for chest pain, palpitations and leg swelling.   Gastrointestinal: Negative.  Negative for diarrhea, nausea and vomiting.   Endocrine: Negative.    Genitourinary: Negative.    Musculoskeletal: Negative.  Negative for myalgias.   Skin: Negative.    Allergic/Immunologic: Negative.    Neurological: Negative for dizziness, light-headedness and headaches.   Hematological: Negative.    Psychiatric/Behavioral: Negative.    All other systems reviewed and are negative.        Vitals:    03/13/19 1621   BP: 139/85   Pulse: 75   Resp: 16   Temp: 97.9 °F (36.6 °C)   SpO2: 97%   Weight: 90.3 kg (199 lb)   Height: 165.1 cm (65\")     Wt Readings from Last 3 Encounters:   03/13/19 90.3 kg (199 lb)   01/21/19 85.3 kg (188 lb)   12/26/18 83.5 kg (184 lb)     BP Readings from Last " 3 Encounters:   03/13/19 139/85   01/21/19 132/86   12/26/18 130/80     Social History     Socioeconomic History   • Marital status:      Spouse name: Not on file   • Number of children: Not on file   • Years of education: Not on file   • Highest education level: Not on file   Social Needs   • Financial resource strain: Not on file   • Food insecurity - worry: Not on file   • Food insecurity - inability: Not on file   • Transportation needs - medical: Not on file   • Transportation needs - non-medical: Not on file   Occupational History   • Not on file   Tobacco Use   • Smoking status: Never Smoker   • Smokeless tobacco: Never Used   Substance and Sexual Activity   • Alcohol use: No   • Drug use: No   • Sexual activity: Defer   Other Topics Concern   • Not on file   Social History Narrative   • Not on file       Allergies   Allergen Reactions   • Iodinated Diagnostic Agents Rash   • Other Rash     Iv contrast   • Sulfa Antibiotics Rash       Body mass index is 33.12 kg/m².    Objective   Physical Exam   Constitutional: She is oriented to person, place, and time. Vital signs are normal. She appears well-developed and well-nourished.   HENT:   Head: Normocephalic and atraumatic.   Right Ear: Hearing, external ear and ear canal normal. Tympanic membrane is bulging.   Left Ear: Hearing, external ear and ear canal normal. Tympanic membrane is bulging.   Nose: Rhinorrhea present. Right sinus exhibits no maxillary sinus tenderness and no frontal sinus tenderness. Left sinus exhibits no maxillary sinus tenderness and no frontal sinus tenderness.   Mouth/Throat: Uvula is midline and mucous membranes are normal.   2+ drainage noted     Eyes: Conjunctivae, EOM and lids are normal. Pupils are equal, round, and reactive to light.   Neck: Trachea normal and phonation normal. Neck supple. No edema present.   Cardiovascular: Normal rate, regular rhythm, S1 normal, S2 normal, normal heart sounds and normal pulses.    Pulmonary/Chest: Effort normal and breath sounds normal.   Abdominal: Soft. Normal appearance, normal aorta and bowel sounds are normal. There is no hepatomegaly. There is no tenderness.   Lymphadenopathy:     She has cervical adenopathy (shotty).        Right cervical: Superficial cervical adenopathy present.        Left cervical: Superficial cervical adenopathy present.   Neurological: She is alert and oriented to person, place, and time.   Skin: Skin is warm, dry and intact. Capillary refill takes less than 2 seconds.   Psychiatric: She has a normal mood and affect. Her speech is normal and behavior is normal. Judgment and thought content normal. Cognition and memory are normal.       Assessment/Plan   Nesha was seen today for cough, sore throat and neck pain.    Diagnoses and all orders for this visit:    Acute URI  -     methylPREDNISolone (MEDROL, HARJIT,) 4 MG tablet; Take as directed on package instructions.  -     azithromycin (ZITHROMAX Z-HARJIT) 250 MG tablet; Take 2 tablets the first day, then 1 tablet daily for 4 days.    Obesity (BMI 30.0-34.9)      1. New onset acute upper respiratory infection.  I have prescribed a Z-Harjit and Medrol Dosepak to pharmacy.  She will continue her Dayquil and Nyquil  over-the-counter as needed.  Nesha will increase fluid intake and rest as much as possible.  She will return to office if no improvement.  2.  Chronic obesity: I have stress diet and exercise to help her lose weight. Plan to reevaluated at next office visit.    REA [facility]  [address] [address]  Dept: [phone]  Dept Fax: [phone]  Loc: [phone]  Loc Fax: [phone]

## 2019-05-26 DIAGNOSIS — E78.2 MIXED HYPERLIPIDEMIA: ICD-10-CM

## 2019-05-27 RX ORDER — PRAVASTATIN SODIUM 20 MG
20 TABLET ORAL DAILY
Qty: 90 TABLET | Refills: 3 | Status: SHIPPED | OUTPATIENT
Start: 2019-05-27 | End: 2020-08-03

## 2019-07-22 DIAGNOSIS — E55.9 VITAMIN D INSUFFICIENCY: Primary | ICD-10-CM

## 2019-07-22 DIAGNOSIS — Z00.00 ENCOUNTER FOR ANNUAL PHYSICAL EXAM: ICD-10-CM

## 2019-07-22 DIAGNOSIS — E78.2 MIXED HYPERLIPIDEMIA: ICD-10-CM

## 2019-07-22 DIAGNOSIS — E55.9 VITAMIN D DEFICIENCY: ICD-10-CM

## 2019-07-24 LAB
25(OH)D3+25(OH)D2 SERPL-MCNC: 50.6 NG/ML (ref 30–100)
ALBUMIN SERPL-MCNC: 4.6 G/DL (ref 3.5–5.2)
ALBUMIN/GLOB SERPL: 2.1 G/DL
ALP SERPL-CCNC: 60 U/L (ref 39–117)
ALT SERPL-CCNC: 21 U/L (ref 1–33)
AST SERPL-CCNC: 19 U/L (ref 1–32)
BASOPHILS # BLD AUTO: 0.06 10*3/MM3 (ref 0–0.2)
BASOPHILS NFR BLD AUTO: 1.4 % (ref 0–1.5)
BILIRUB SERPL-MCNC: 0.5 MG/DL (ref 0.2–1.2)
BUN SERPL-MCNC: 13 MG/DL (ref 6–20)
BUN/CREAT SERPL: 15.9 (ref 7–25)
CALCIUM SERPL-MCNC: 9.3 MG/DL (ref 8.6–10.5)
CHLORIDE SERPL-SCNC: 104 MMOL/L (ref 98–107)
CHOLEST SERPL-MCNC: 222 MG/DL (ref 0–200)
CHOLEST/HDLC SERPL: 3.96 {RATIO}
CO2 SERPL-SCNC: 27.1 MMOL/L (ref 22–29)
CREAT SERPL-MCNC: 0.82 MG/DL (ref 0.57–1)
EOSINOPHIL # BLD AUTO: 0.21 10*3/MM3 (ref 0–0.4)
EOSINOPHIL NFR BLD AUTO: 4.8 % (ref 0.3–6.2)
ERYTHROCYTE [DISTWIDTH] IN BLOOD BY AUTOMATED COUNT: 12.3 % (ref 12.3–15.4)
GLOBULIN SER CALC-MCNC: 2.2 GM/DL
GLUCOSE SERPL-MCNC: 98 MG/DL (ref 65–99)
HCT VFR BLD AUTO: 44.3 % (ref 34–46.6)
HDLC SERPL-MCNC: 56 MG/DL (ref 40–60)
HGB BLD-MCNC: 14.1 G/DL (ref 12–15.9)
IMM GRANULOCYTES # BLD AUTO: 0.01 10*3/MM3 (ref 0–0.05)
IMM GRANULOCYTES NFR BLD AUTO: 0.2 % (ref 0–0.5)
LDLC SERPL CALC-MCNC: 132 MG/DL (ref 0–100)
LYMPHOCYTES # BLD AUTO: 1.22 10*3/MM3 (ref 0.7–3.1)
LYMPHOCYTES NFR BLD AUTO: 28.2 % (ref 19.6–45.3)
MCH RBC QN AUTO: 31.4 PG (ref 26.6–33)
MCHC RBC AUTO-ENTMCNC: 31.8 G/DL (ref 31.5–35.7)
MCV RBC AUTO: 98.7 FL (ref 79–97)
MONOCYTES # BLD AUTO: 0.35 10*3/MM3 (ref 0.1–0.9)
MONOCYTES NFR BLD AUTO: 8.1 % (ref 5–12)
NEUTROPHILS # BLD AUTO: 2.48 10*3/MM3 (ref 1.7–7)
NEUTROPHILS NFR BLD AUTO: 57.3 % (ref 42.7–76)
NRBC BLD AUTO-RTO: 0 /100 WBC (ref 0–0.2)
PLATELET # BLD AUTO: 234 10*3/MM3 (ref 140–450)
POTASSIUM SERPL-SCNC: 4.4 MMOL/L (ref 3.5–5.2)
PROT SERPL-MCNC: 6.8 G/DL (ref 6–8.5)
RBC # BLD AUTO: 4.49 10*6/MM3 (ref 3.77–5.28)
SODIUM SERPL-SCNC: 144 MMOL/L (ref 136–145)
TRIGL SERPL-MCNC: 172 MG/DL (ref 0–150)
TSH SERPL DL<=0.005 MIU/L-ACNC: 2.44 MIU/ML (ref 0.27–4.2)
VLDLC SERPL CALC-MCNC: 34.4 MG/DL
WBC # BLD AUTO: 4.33 10*3/MM3 (ref 3.4–10.8)

## 2019-07-29 ENCOUNTER — OFFICE VISIT (OUTPATIENT)
Dept: FAMILY MEDICINE CLINIC | Facility: CLINIC | Age: 59
End: 2019-07-29

## 2019-07-29 VITALS
HEIGHT: 65 IN | HEART RATE: 74 BPM | OXYGEN SATURATION: 98 % | SYSTOLIC BLOOD PRESSURE: 138 MMHG | RESPIRATION RATE: 16 BRPM | DIASTOLIC BLOOD PRESSURE: 96 MMHG | BODY MASS INDEX: 30.99 KG/M2 | WEIGHT: 186 LBS | TEMPERATURE: 98 F

## 2019-07-29 DIAGNOSIS — R03.0 BORDERLINE HYPERTENSION: ICD-10-CM

## 2019-07-29 DIAGNOSIS — N39.0 URINARY TRACT INFECTION WITHOUT HEMATURIA, SITE UNSPECIFIED: ICD-10-CM

## 2019-07-29 DIAGNOSIS — E78.2 MIXED HYPERLIPIDEMIA: Primary | ICD-10-CM

## 2019-07-29 DIAGNOSIS — M77.8 THUMB TENDONITIS: ICD-10-CM

## 2019-07-29 DIAGNOSIS — R39.15 URINARY URGENCY: ICD-10-CM

## 2019-07-29 LAB
BILIRUB BLD-MCNC: NEGATIVE MG/DL
CLARITY, POC: CLEAR
COLOR UR: YELLOW
GLUCOSE UR STRIP-MCNC: NEGATIVE MG/DL
KETONES UR QL: NEGATIVE
LEUKOCYTE EST, POC: ABNORMAL
NITRITE UR-MCNC: NEGATIVE MG/ML
PH UR: 5 [PH] (ref 5–8)
PROT UR STRIP-MCNC: ABNORMAL MG/DL
RBC # UR STRIP: ABNORMAL /UL
SP GR UR: 1.03 (ref 1–1.03)
UROBILINOGEN UR QL: NORMAL

## 2019-07-29 PROCEDURE — 99214 OFFICE O/P EST MOD 30 MIN: CPT | Performed by: PHYSICIAN ASSISTANT

## 2019-07-29 PROCEDURE — 81002 URINALYSIS NONAUTO W/O SCOPE: CPT | Performed by: PHYSICIAN ASSISTANT

## 2019-07-29 RX ORDER — NITROFURANTOIN 25; 75 MG/1; MG/1
100 CAPSULE ORAL 2 TIMES DAILY
Qty: 14 CAPSULE | Refills: 0 | OUTPATIENT
Start: 2019-07-29 | End: 2019-11-03

## 2019-07-29 RX ORDER — PHENAZOPYRIDINE HYDROCHLORIDE 200 MG/1
200 TABLET, FILM COATED ORAL 3 TIMES DAILY PRN
Qty: 6 TABLET | Refills: 0 | OUTPATIENT
Start: 2019-07-29 | End: 2019-11-03

## 2019-07-29 NOTE — PROGRESS NOTES
Subjective   Nesha Kee is a 58 y.o. female presents for   Chief Complaint   Patient presents with   • Hyperlipidemia   • Urinary Tract Infection       History of Present Illness     Nesha is a 58-year-old female who presents for hyperlipidemia management.  She has lost 13 pounds since March 13, 2019.  She has been forgetting to take her statin medication at night.  She also has increased urine frequency and urine urgency that started last night.  Nesha had some urine hesitancy with pressure feeling.  Denied any dysuria,fevers,chills,nausea,vomiting or diarrhea.  Appetite has been slightly healthy.  Sleep has been normal for her.  Nesha has not been exercising this summer.  Bowel movements are daily without dark black tarry stools.  Denied any chest pain,shortness of air,wheezing or swelling of ankles.  She hyperextended her right thumb 2 months ago.  Since that time she has had pain and swelling in her thumb area.  She has had decreased range of motion of her thumb.  She has not been taking any OTC medication.  She has not worn a brace or splint.      The following portions of the patient's history were reviewed and updated as appropriate: allergies, current medications, past family history, past medical history, past social history, past surgical history and problem list.    Review of Systems   Constitutional: Negative.    HENT: Negative.    Eyes: Negative.    Respiratory: Negative.  Negative for cough, shortness of breath and wheezing.    Cardiovascular: Negative.  Negative for chest pain, palpitations and leg swelling.   Gastrointestinal: Negative.  Negative for abdominal pain, constipation, diarrhea, nausea and vomiting.   Endocrine: Negative.    Genitourinary: Positive for frequency and urgency. Negative for decreased urine volume, dysuria, vaginal bleeding, vaginal discharge and vaginal pain.   Musculoskeletal: Negative.    Skin: Negative.    Allergic/Immunologic: Negative.    Neurological: Negative.   "Negative for dizziness, light-headedness and headaches.   Hematological: Negative.    Psychiatric/Behavioral: Negative.  Negative for sleep disturbance.   All other systems reviewed and are negative.        Vitals:    07/29/19 0744   BP: 138/96   Pulse: 74   Resp: 16   Temp: 98 °F (36.7 °C)   SpO2: 98%   Weight: 84.4 kg (186 lb)   Height: 165.1 cm (65\")     Wt Readings from Last 3 Encounters:   07/29/19 84.4 kg (186 lb)   03/13/19 90.3 kg (199 lb)   01/21/19 85.3 kg (188 lb)     BP Readings from Last 3 Encounters:   07/29/19 138/96   03/13/19 139/85   01/21/19 132/86     Social History     Socioeconomic History   • Marital status:      Spouse name: Not on file   • Number of children: Not on file   • Years of education: Not on file   • Highest education level: Not on file   Tobacco Use   • Smoking status: Never Smoker   • Smokeless tobacco: Never Used   Substance and Sexual Activity   • Alcohol use: No   • Drug use: No   • Sexual activity: Defer       Allergies   Allergen Reactions   • Iodinated Diagnostic Agents Rash   • Other Rash     Iv contrast   • Sulfa Antibiotics Rash       Body mass index is 30.95 kg/m².    Objective   Physical Exam   Constitutional: She is oriented to person, place, and time. Vital signs are normal. She appears well-developed and well-nourished.   Neck: Trachea normal and phonation normal. Neck supple. No tracheal tenderness present. Carotid bruit is not present. No tracheal deviation and no edema present. No thyroid mass and no thyromegaly present.   Cardiovascular: Normal rate, regular rhythm, S1 normal, S2 normal, normal heart sounds and normal pulses.   No murmur heard.  Pulmonary/Chest: Effort normal and breath sounds normal.   Abdominal: Soft. Normal appearance, normal aorta and bowel sounds are normal. There is no hepatomegaly. There is no tenderness. There is no CVA tenderness.   Musculoskeletal:        Right hand: She exhibits tenderness. She exhibits normal range of motion, " no bony tenderness, normal two-point discrimination, normal capillary refill, no deformity, no laceration and no swelling. Normal sensation noted. Normal strength noted. She exhibits no finger abduction, no thumb/finger opposition and no wrist extension trouble.        Hands:  Neurological: She is alert and oriented to person, place, and time.   Skin: Skin is warm, dry and intact. Capillary refill takes less than 2 seconds.   Psychiatric: She has a normal mood and affect. Her speech is normal and behavior is normal. Judgment and thought content normal. Cognition and memory are normal.     Results for orders placed or performed in visit on 07/29/19   POC Urinalysis Dipstick   Result Value Ref Range    Color Yellow Yellow, Straw, Dark Yellow, Bonnie    Clarity, UA Clear Clear    Glucose, UA Negative Negative, 1000 mg/dL (3+) mg/dL    Bilirubin Negative Negative    Ketones, UA Negative Negative    Specific Gravity  1.030 1.005 - 1.030    Blood, UA Small (A) Negative    pH, Urine 5.0 5.0 - 8.0    Protein, POC Trace (A) Negative mg/dL    Urobilinogen, UA Normal Normal    Leukocytes Small (1+) (A) Negative    Nitrite, UA Negative Negative       Assessment/Plan   Nesha was seen today for hyperlipidemia and urinary tract infection.    Diagnoses and all orders for this visit:    Mixed hyperlipidemia    Urinary urgency  -     Urine Culture - Urine, Urine, Clean Catch  -     POC Urinalysis Dipstick    Urinary tract infection without hematuria, site unspecified  -     Urine Culture - Urine, Urine, Clean Catch  -     nitrofurantoin, macrocrystal-monohydrate, (MACROBID) 100 MG capsule; Take 1 capsule by mouth 2 (Two) Times a Day.  -     phenazopyridine (PYRIDIUM) 200 MG tablet; Take 1 tablet by mouth 3 (Three) Times a Day As Needed for bladder spasms.    Borderline hypertension    Thumb tendonitis    1.  Chronic and stable hyperlipidemia: I have reviewed her lab work that was collected on July 23, 2019 with her at office visit  today.  Fasting blood sugar was 98, cholesterol 222, triglycerides 172, HDL 56 and LDL was 132.  Have instructed her to be sure to take her statin medication as directed daily.  She will make dietary changes by decreasing fatty food and carbohydrates in diet.  Will repeat fasting blood work in 6 months.  2.  New urine urgency with UTI: In office urinalysis showed leukocytes and red blood cells.  Urine culture was sent to the laboratory for further evaluation.  She will be notified of test results when completed.  I have sent to pharmacy Macrobid and Pyridium prescriptions to pharmacy.  3.  Borderline Hypertension: I have rechecked her blood pressure at office visit today and got 140/94 in left arm.  Is a little stressed this morning.  I have asked her to return to office in the next week for blood pressure check.  We will hold medication for now.  Decrease salt and sodium in diet.  Will consider blood pressure medication in future if warranted.  4. New  Thumb tendinitis: I have asked to use over-the-counter arthritis strength Tylenol for now.  She will purchase a thumb splint to wear 24/7 for the next 2 weeks.  If no improvement will send to orthopedist/hand specialist.  She voiced understanding.    [facility]  [address] [place] [id number]  Dept: [phone]  Dept Fax: [phone]  Loc: [phone]  Loc Fax: [phone]        Office Visit on 07/29/2019   Component Date Value Ref Range Status   • Color 07/29/2019 Yellow  Yellow, Straw, Dark Yellow, Bonnie Final   • Clarity, UA 07/29/2019 Clear  Clear Final   • Glucose, UA 07/29/2019 Negative  Negative, 1000 mg/dL (3+) mg/dL Final   • Bilirubin 07/29/2019 Negative  Negative Final   • Ketones, UA 07/29/2019 Negative  Negative Final   • Specific Gravity  07/29/2019 1.030  1.005 - 1.030 Final   • Blood, UA 07/29/2019 Small* Negative Final   • pH, Urine 07/29/2019 5.0   5.0 - 8.0 Final   • Protein, POC 07/29/2019 Trace* Negative mg/dL Final   • Urobilinogen, UA 07/29/2019 Normal  Normal Final   • Leukocytes 07/29/2019 Small (1+)* Negative Final   • Nitrite, UA 07/29/2019 Negative  Negative Final   Orders Only on 07/22/2019   Component Date Value Ref Range Status   • WBC 07/23/2019 4.33  3.40 - 10.80 10*3/mm3 Final   • RBC 07/23/2019 4.49  3.77 - 5.28 10*6/mm3 Final   • Hemoglobin 07/23/2019 14.1  12.0 - 15.9 g/dL Final   • Hematocrit 07/23/2019 44.3  34.0 - 46.6 % Final   • MCV 07/23/2019 98.7* 79.0 - 97.0 fL Final   • MCH 07/23/2019 31.4  26.6 - 33.0 pg Final   • MCHC 07/23/2019 31.8  31.5 - 35.7 g/dL Final   • RDW 07/23/2019 12.3  12.3 - 15.4 % Final   • Platelets 07/23/2019 234  140 - 450 10*3/mm3 Final   • Neutrophil Rel % 07/23/2019 57.3  42.7 - 76.0 % Final   • Lymphocyte Rel % 07/23/2019 28.2  19.6 - 45.3 % Final   • Monocyte Rel % 07/23/2019 8.1  5.0 - 12.0 % Final   • Eosinophil Rel % 07/23/2019 4.8  0.3 - 6.2 % Final   • Basophil Rel % 07/23/2019 1.4  0.0 - 1.5 % Final   • Neutrophils Absolute 07/23/2019 2.48  1.70 - 7.00 10*3/mm3 Final   • Lymphocytes Absolute 07/23/2019 1.22  0.70 - 3.10 10*3/mm3 Final   • Monocytes Absolute 07/23/2019 0.35  0.10 - 0.90 10*3/mm3 Final   • Eosinophils Absolute 07/23/2019 0.21  0.00 - 0.40 10*3/mm3 Final   • Basophils Absolute 07/23/2019 0.06  0.00 - 0.20 10*3/mm3 Final   • Immature Granulocyte Rel % 07/23/2019 0.2  0.0 - 0.5 % Final   • Immature Grans Absolute 07/23/2019 0.01  0.00 - 0.05 10*3/mm3 Final   • nRBC 07/23/2019 0.0  0.0 - 0.2 /100 WBC Final   • Glucose 07/23/2019 98  65 - 99 mg/dL Final   • BUN 07/23/2019 13  6 - 20 mg/dL Final   • Creatinine 07/23/2019 0.82  0.57 - 1.00 mg/dL Final   • eGFR Non  Am 07/23/2019 71  >60 mL/min/1.73 Final   • eGFR African Am 07/23/2019 86  >60 mL/min/1.73 Final   • BUN/Creatinine Ratio 07/23/2019 15.9  7.0 - 25.0 Final   • Sodium 07/23/2019 144  136 - 145 mmol/L Final   • Potassium  07/23/2019 4.4  3.5 - 5.2 mmol/L Final   • Chloride 07/23/2019 104  98 - 107 mmol/L Final   • Total CO2 07/23/2019 27.1  22.0 - 29.0 mmol/L Final   • Calcium 07/23/2019 9.3  8.6 - 10.5 mg/dL Final   • Total Protein 07/23/2019 6.8  6.0 - 8.5 g/dL Final   • Albumin 07/23/2019 4.60  3.50 - 5.20 g/dL Final   • Globulin 07/23/2019 2.2  gm/dL Final   • A/G Ratio 07/23/2019 2.1  g/dL Final   • Total Bilirubin 07/23/2019 0.5  0.2 - 1.2 mg/dL Final   • Alkaline Phosphatase 07/23/2019 60  39 - 117 U/L Final   • AST (SGOT) 07/23/2019 19  1 - 32 U/L Final   • ALT (SGPT) 07/23/2019 21  1 - 33 U/L Final   • Total Cholesterol 07/23/2019 222* 0 - 200 mg/dL Final   • Triglycerides 07/23/2019 172* 0 - 150 mg/dL Final   • HDL Cholesterol 07/23/2019 56  40 - 60 mg/dL Final   • VLDL Cholesterol 07/23/2019 34.4  mg/dL Final   • LDL Cholesterol  07/23/2019 132* 0 - 100 mg/dL Final   • Chol/HDL Ratio 07/23/2019 3.96   Final   • 25 Hydroxy, Vitamin D 07/23/2019 50.6  30.0 - 100.0 ng/ml Final    Comment: Reference Range for Total Vitamin D 25(OH)  Deficiency <20.0 ng/mL  Insufficiency 21-29 ng/mL  Sufficiency  ng/mL  Toxicity >100 ng/ml     • TSH 07/23/2019 2.440  0.270 - 4.200 mIU/mL Final

## 2019-07-30 PROBLEM — M77.8 THUMB TENDONITIS: Status: ACTIVE | Noted: 2019-07-30

## 2019-07-31 LAB
BACTERIA UR CULT: NO GROWTH
BACTERIA UR CULT: NORMAL

## 2019-08-02 ENCOUNTER — TELEPHONE (OUTPATIENT)
Dept: FAMILY MEDICINE CLINIC | Facility: CLINIC | Age: 59
End: 2019-08-02

## 2019-08-02 NOTE — TELEPHONE ENCOUNTER
Notify patient hat  her blood pressure has improved.  I would like to have her stop by again in 2 weeks to check blood pressure readings again.  I suspect this may be stress-induced.

## 2019-08-28 ENCOUNTER — CLINICAL SUPPORT (OUTPATIENT)
Dept: FAMILY MEDICINE CLINIC | Facility: CLINIC | Age: 59
End: 2019-08-28

## 2019-08-28 ENCOUNTER — TELEPHONE (OUTPATIENT)
Dept: FAMILY MEDICINE CLINIC | Facility: CLINIC | Age: 59
End: 2019-08-28

## 2019-08-28 VITALS — DIASTOLIC BLOOD PRESSURE: 84 MMHG | SYSTOLIC BLOOD PRESSURE: 138 MMHG

## 2019-08-28 DIAGNOSIS — R03.0 BORDERLINE HYPERTENSION: Primary | ICD-10-CM

## 2019-12-04 RX ORDER — MESALAMINE 1.2 G/1
1200 TABLET, DELAYED RELEASE ORAL
Qty: 90 TABLET | Refills: 3 | Status: SHIPPED | OUTPATIENT
Start: 2019-12-04 | End: 2019-12-10 | Stop reason: SDUPTHER

## 2019-12-10 RX ORDER — MESALAMINE 1.2 G/1
1200 TABLET, DELAYED RELEASE ORAL
Qty: 90 TABLET | Refills: 3 | Status: SHIPPED | OUTPATIENT
Start: 2019-12-10 | End: 2021-02-09

## 2020-01-20 DIAGNOSIS — R53.83 FATIGUE, UNSPECIFIED TYPE: ICD-10-CM

## 2020-01-20 DIAGNOSIS — Z00.00 ENCOUNTER FOR ANNUAL PHYSICAL EXAM: Primary | ICD-10-CM

## 2020-01-29 ENCOUNTER — RESULTS ENCOUNTER (OUTPATIENT)
Dept: FAMILY MEDICINE CLINIC | Facility: CLINIC | Age: 60
End: 2020-01-29

## 2020-01-29 DIAGNOSIS — R53.83 FATIGUE, UNSPECIFIED TYPE: ICD-10-CM

## 2020-01-29 DIAGNOSIS — Z00.00 ENCOUNTER FOR ANNUAL PHYSICAL EXAM: ICD-10-CM

## 2020-01-30 LAB
ALBUMIN SERPL-MCNC: 4.5 G/DL (ref 3.5–5.2)
ALBUMIN/GLOB SERPL: 1.9 G/DL
ALP SERPL-CCNC: 59 U/L (ref 39–117)
ALT SERPL-CCNC: 21 U/L (ref 1–33)
AST SERPL-CCNC: 20 U/L (ref 1–32)
BASOPHILS # BLD AUTO: 0.08 10*3/MM3 (ref 0–0.2)
BASOPHILS NFR BLD AUTO: 1.4 % (ref 0–1.5)
BILIRUB SERPL-MCNC: 0.6 MG/DL (ref 0.2–1.2)
BUN SERPL-MCNC: 12 MG/DL (ref 8–23)
BUN/CREAT SERPL: 13 (ref 7–25)
CALCIUM SERPL-MCNC: 9.4 MG/DL (ref 8.6–10.5)
CHLORIDE SERPL-SCNC: 102 MMOL/L (ref 98–107)
CHOLEST SERPL-MCNC: 199 MG/DL (ref 0–200)
CO2 SERPL-SCNC: 24.6 MMOL/L (ref 22–29)
CREAT SERPL-MCNC: 0.92 MG/DL (ref 0.57–1)
EOSINOPHIL # BLD AUTO: 0.27 10*3/MM3 (ref 0–0.4)
EOSINOPHIL NFR BLD AUTO: 4.9 % (ref 0.3–6.2)
ERYTHROCYTE [DISTWIDTH] IN BLOOD BY AUTOMATED COUNT: 12.3 % (ref 12.3–15.4)
FT4I SERPL CALC-MCNC: 2.3 (ref 1.2–4.9)
GLOBULIN SER CALC-MCNC: 2.4 GM/DL
GLUCOSE SERPL-MCNC: 94 MG/DL (ref 65–99)
HCT VFR BLD AUTO: 41.8 % (ref 34–46.6)
HDLC SERPL-MCNC: 56 MG/DL (ref 40–60)
HGB BLD-MCNC: 14.2 G/DL (ref 12–15.9)
IMM GRANULOCYTES # BLD AUTO: 0.01 10*3/MM3 (ref 0–0.05)
IMM GRANULOCYTES NFR BLD AUTO: 0.2 % (ref 0–0.5)
LDLC SERPL CALC-MCNC: 112 MG/DL (ref 0–100)
LDLC/HDLC SERPL: 2 {RATIO}
LYMPHOCYTES # BLD AUTO: 1.3 10*3/MM3 (ref 0.7–3.1)
LYMPHOCYTES NFR BLD AUTO: 23.5 % (ref 19.6–45.3)
MCH RBC QN AUTO: 31.5 PG (ref 26.6–33)
MCHC RBC AUTO-ENTMCNC: 34 G/DL (ref 31.5–35.7)
MCV RBC AUTO: 92.7 FL (ref 79–97)
MONOCYTES # BLD AUTO: 0.46 10*3/MM3 (ref 0.1–0.9)
MONOCYTES NFR BLD AUTO: 8.3 % (ref 5–12)
NEUTROPHILS # BLD AUTO: 3.41 10*3/MM3 (ref 1.7–7)
NEUTROPHILS NFR BLD AUTO: 61.7 % (ref 42.7–76)
NRBC BLD AUTO-RTO: 0 /100 WBC (ref 0–0.2)
PLATELET # BLD AUTO: 252 10*3/MM3 (ref 140–450)
POTASSIUM SERPL-SCNC: 4.3 MMOL/L (ref 3.5–5.2)
PROT SERPL-MCNC: 6.9 G/DL (ref 6–8.5)
RBC # BLD AUTO: 4.51 10*6/MM3 (ref 3.77–5.28)
SODIUM SERPL-SCNC: 141 MMOL/L (ref 136–145)
T3RU NFR SERPL: 27 % (ref 24–39)
T4 SERPL-MCNC: 8.4 UG/DL (ref 4.5–12)
TRIGL SERPL-MCNC: 155 MG/DL (ref 0–150)
TSH SERPL DL<=0.005 MIU/L-ACNC: 1.18 UIU/ML (ref 0.45–4.5)
VLDLC SERPL CALC-MCNC: 31 MG/DL
WBC # BLD AUTO: 5.53 10*3/MM3 (ref 3.4–10.8)

## 2020-02-05 ENCOUNTER — OFFICE VISIT (OUTPATIENT)
Dept: FAMILY MEDICINE CLINIC | Facility: CLINIC | Age: 60
End: 2020-02-05

## 2020-02-05 VITALS
WEIGHT: 188 LBS | RESPIRATION RATE: 16 BRPM | SYSTOLIC BLOOD PRESSURE: 138 MMHG | OXYGEN SATURATION: 98 % | HEART RATE: 71 BPM | TEMPERATURE: 98.2 F | HEIGHT: 65 IN | BODY MASS INDEX: 31.32 KG/M2 | DIASTOLIC BLOOD PRESSURE: 88 MMHG

## 2020-02-05 DIAGNOSIS — J30.89 NON-SEASONAL ALLERGIC RHINITIS DUE TO OTHER ALLERGIC TRIGGER: ICD-10-CM

## 2020-02-05 DIAGNOSIS — Z12.31 SCREENING MAMMOGRAM, ENCOUNTER FOR: ICD-10-CM

## 2020-02-05 DIAGNOSIS — R53.82 CHRONIC FATIGUE: ICD-10-CM

## 2020-02-05 DIAGNOSIS — E78.2 MIXED HYPERLIPIDEMIA: ICD-10-CM

## 2020-02-05 DIAGNOSIS — Z23 NEED FOR SHINGLES VACCINE: ICD-10-CM

## 2020-02-05 DIAGNOSIS — J06.9 UPPER RESPIRATORY TRACT INFECTION, UNSPECIFIED TYPE: ICD-10-CM

## 2020-02-05 DIAGNOSIS — Z00.00 ENCOUNTER FOR ANNUAL PHYSICAL EXAM: Primary | ICD-10-CM

## 2020-02-05 PROCEDURE — 99396 PREV VISIT EST AGE 40-64: CPT | Performed by: PHYSICIAN ASSISTANT

## 2020-02-05 RX ORDER — FLUTICASONE PROPIONATE 50 MCG
2 SPRAY, SUSPENSION (ML) NASAL DAILY
Qty: 15.8 ML | Refills: 0 | Status: SHIPPED | OUTPATIENT
Start: 2020-02-05 | End: 2020-08-03

## 2020-02-05 RX ORDER — LEVOCETIRIZINE DIHYDROCHLORIDE 5 MG/1
5 TABLET, FILM COATED ORAL EVERY EVENING
Qty: 30 TABLET | Refills: 12 | Status: SHIPPED | OUTPATIENT
Start: 2020-02-05

## 2020-02-06 LAB
25(OH)D3+25(OH)D2 SERPL-MCNC: 48.9 NG/ML (ref 30–100)
FOLATE SERPL-MCNC: 10.8 NG/ML (ref 4.78–24.2)
VIT B12 SERPL-MCNC: 285 PG/ML (ref 211–946)

## 2020-02-18 ENCOUNTER — HOSPITAL ENCOUNTER (OUTPATIENT)
Dept: MAMMOGRAPHY | Facility: HOSPITAL | Age: 60
Discharge: HOME | End: 2020-02-18
Admitting: PHYSICIAN ASSISTANT

## 2020-02-18 DIAGNOSIS — Z12.31 SCREENING MAMMOGRAM, ENCOUNTER FOR: ICD-10-CM

## 2020-02-18 PROCEDURE — 77063 BREAST TOMOSYNTHESIS BI: CPT

## 2020-02-18 PROCEDURE — 77067 SCR MAMMO BI INCL CAD: CPT

## 2020-07-22 DIAGNOSIS — E78.2 MIXED HYPERLIPIDEMIA: Primary | ICD-10-CM

## 2020-07-23 LAB
ALBUMIN SERPL-MCNC: 4.4 G/DL (ref 3.5–5.2)
ALBUMIN/GLOB SERPL: 1.7 G/DL
ALP SERPL-CCNC: 55 U/L (ref 39–117)
ALT SERPL-CCNC: 19 U/L (ref 1–33)
AST SERPL-CCNC: 15 U/L (ref 1–32)
BASOPHILS # BLD AUTO: 0.07 10*3/MM3 (ref 0–0.2)
BASOPHILS NFR BLD AUTO: 1.4 % (ref 0–1.5)
BILIRUB SERPL-MCNC: 0.4 MG/DL (ref 0–1.2)
BUN SERPL-MCNC: 13 MG/DL (ref 8–23)
BUN/CREAT SERPL: 14.9 (ref 7–25)
CALCIUM SERPL-MCNC: 9.5 MG/DL (ref 8.6–10.5)
CHLORIDE SERPL-SCNC: 104 MMOL/L (ref 98–107)
CHOLEST SERPL-MCNC: 234 MG/DL (ref 0–200)
CO2 SERPL-SCNC: 28.4 MMOL/L (ref 22–29)
CREAT SERPL-MCNC: 0.87 MG/DL (ref 0.57–1)
EOSINOPHIL # BLD AUTO: 0.26 10*3/MM3 (ref 0–0.4)
EOSINOPHIL NFR BLD AUTO: 5.3 % (ref 0.3–6.2)
ERYTHROCYTE [DISTWIDTH] IN BLOOD BY AUTOMATED COUNT: 12.4 % (ref 12.3–15.4)
GLOBULIN SER CALC-MCNC: 2.6 GM/DL
GLUCOSE SERPL-MCNC: 91 MG/DL (ref 65–99)
HCT VFR BLD AUTO: 39.7 % (ref 34–46.6)
HDLC SERPL-MCNC: 50 MG/DL (ref 40–60)
HGB BLD-MCNC: 13.9 G/DL (ref 12–15.9)
IMM GRANULOCYTES # BLD AUTO: 0.01 10*3/MM3 (ref 0–0.05)
IMM GRANULOCYTES NFR BLD AUTO: 0.2 % (ref 0–0.5)
LDLC SERPL CALC-MCNC: 132 MG/DL (ref 0–100)
LDLC/HDLC SERPL: 2.64 {RATIO}
LYMPHOCYTES # BLD AUTO: 1.25 10*3/MM3 (ref 0.7–3.1)
LYMPHOCYTES NFR BLD AUTO: 25.7 % (ref 19.6–45.3)
MCH RBC QN AUTO: 32.6 PG (ref 26.6–33)
MCHC RBC AUTO-ENTMCNC: 35 G/DL (ref 31.5–35.7)
MCV RBC AUTO: 93.2 FL (ref 79–97)
MONOCYTES # BLD AUTO: 0.39 10*3/MM3 (ref 0.1–0.9)
MONOCYTES NFR BLD AUTO: 8 % (ref 5–12)
NEUTROPHILS # BLD AUTO: 2.89 10*3/MM3 (ref 1.7–7)
NEUTROPHILS NFR BLD AUTO: 59.4 % (ref 42.7–76)
NRBC BLD AUTO-RTO: 0 /100 WBC (ref 0–0.2)
PLATELET # BLD AUTO: 226 10*3/MM3 (ref 140–450)
POTASSIUM SERPL-SCNC: 4.2 MMOL/L (ref 3.5–5.2)
PROT SERPL-MCNC: 7 G/DL (ref 6–8.5)
RBC # BLD AUTO: 4.26 10*6/MM3 (ref 3.77–5.28)
SODIUM SERPL-SCNC: 141 MMOL/L (ref 136–145)
TRIGL SERPL-MCNC: 259 MG/DL (ref 0–150)
VLDLC SERPL CALC-MCNC: 51.8 MG/DL
WBC # BLD AUTO: 4.87 10*3/MM3 (ref 3.4–10.8)

## 2020-08-03 ENCOUNTER — OFFICE VISIT (OUTPATIENT)
Dept: FAMILY MEDICINE CLINIC | Facility: CLINIC | Age: 60
End: 2020-08-03

## 2020-08-03 VITALS
OXYGEN SATURATION: 99 % | DIASTOLIC BLOOD PRESSURE: 82 MMHG | HEIGHT: 65 IN | HEART RATE: 66 BPM | BODY MASS INDEX: 31.16 KG/M2 | SYSTOLIC BLOOD PRESSURE: 118 MMHG | WEIGHT: 187 LBS

## 2020-08-03 DIAGNOSIS — J06.9 UPPER RESPIRATORY TRACT INFECTION, UNSPECIFIED TYPE: ICD-10-CM

## 2020-08-03 DIAGNOSIS — E78.2 MIXED HYPERLIPIDEMIA: Primary | ICD-10-CM

## 2020-08-03 PROCEDURE — 99214 OFFICE O/P EST MOD 30 MIN: CPT | Performed by: PHYSICIAN ASSISTANT

## 2020-08-03 RX ORDER — PRAVASTATIN SODIUM 20 MG
20 TABLET ORAL DAILY
Qty: 90 TABLET | Refills: 3 | Status: SHIPPED | OUTPATIENT
Start: 2020-08-03 | End: 2021-02-15 | Stop reason: DRUGHIGH

## 2020-08-03 RX ORDER — FLUTICASONE PROPIONATE 50 MCG
2 SPRAY, SUSPENSION (ML) NASAL DAILY
Qty: 15.8 ML | Refills: 12 | Status: SHIPPED | OUTPATIENT
Start: 2020-08-03 | End: 2021-08-20

## 2020-08-03 NOTE — PROGRESS NOTES
Subjective  Answers for HPI/ROS submitted by the patient on 7/31/2020   What is the primary reason for your visit?: Other  Please describe your symptoms.: Follow up after labs.  Have you had these symptoms before?: No  How long have you been having these symptoms?: 1-4 days  Please list any medications you are currently taking for this condition.: Mesalamine. 1.2 GM. 1xday, Pravastatin 20 mg, D 3     25 mcg.  2 tabs 1 x day, Probiotic. 1 tab a day, B12. 1,000 MCG  2 TABS 1X Day, Levocetrizine.  5 mg.  1 x day  Please describe any probable cause for these symptoms. : N/A    Nesha Kee is a 59 y.o. female presents for   Chief Complaint   Patient presents with   • Hyperlipidemia       History of Present Illness     Nesha is a 59-year-old female who presents for hyperlipidemia management.  She has lost 1 pound since February 5, 2020.  Nesha states overall she is feeling well at office visit today.  She does need a refill on her Flonase medication.  She does take this daily which helps her allergy and respiratory issues.  Denied any fevers, chills, upper respiratory symptoms, cough, shortness of air, wheezing, dizziness, chest pain or swelling of ankles.  Her diet has been slightly healthy.  She has been eating more seafood with butter lately.  Her  recently got out of half-way and he is doing a vegetarian diet.  States she is trying to do the same.  They have been active by working outside and walking.  Sleep has been normal.  Bowel movements are daily without dark black tarry stools.    The following portions of the patient's history were reviewed and updated as appropriate: allergies, current medications, past family history, past medical history, past social history, past surgical history and problem list.    Review of Systems   Constitutional: Negative.    HENT: Negative.    Eyes: Negative.    Respiratory: Negative.  Negative for cough, chest tightness, shortness of breath and wheezing.    Cardiovascular:  "Negative.  Negative for chest pain and leg swelling.   Gastrointestinal: Negative.  Negative for abdominal pain, constipation, diarrhea, nausea and vomiting.   Endocrine: Negative.    Genitourinary: Negative.    Musculoskeletal: Negative.    Skin: Negative.    Allergic/Immunologic: Negative.    Neurological: Negative.  Negative for dizziness, light-headedness and headaches.   Hematological: Negative.    Psychiatric/Behavioral: Negative.  Negative for suicidal ideas.   All other systems reviewed and are negative.        Vitals:    08/03/20 1104   BP: 118/82   BP Location: Left arm   Patient Position: Sitting   Cuff Size: Adult   Pulse: 66   SpO2: 99%   Weight: 84.8 kg (187 lb)   Height: 165.1 cm (65\")     Wt Readings from Last 3 Encounters:   08/03/20 84.8 kg (187 lb)   02/05/20 85.3 kg (188 lb)   07/29/19 84.4 kg (186 lb)     BP Readings from Last 3 Encounters:   08/03/20 118/82   02/05/20 138/88   11/03/19 156/100     Social History     Socioeconomic History   • Marital status:      Spouse name: Not on file   • Number of children: Not on file   • Years of education: Not on file   • Highest education level: Not on file   Tobacco Use   • Smoking status: Never Smoker   • Smokeless tobacco: Never Used   Substance and Sexual Activity   • Alcohol use: No   • Drug use: No   • Sexual activity: Defer       Allergies   Allergen Reactions   • Iodinated Diagnostic Agents Rash   • Other Rash     Iv contrast   • Sulfa Antibiotics Rash       Body mass index is 31.12 kg/m².    Objective   Physical Exam   Constitutional: She is oriented to person, place, and time. Vital signs are normal. She appears well-developed and well-nourished.   Sitting on exam table wearing facial mask   HENT:   Head: Normocephalic and atraumatic.   Neck: Trachea normal and phonation normal. Neck supple. No hepatojugular reflux and no JVD present. No tracheal tenderness present. Carotid bruit is not present. No tracheal deviation and no edema " present. No thyroid mass present.   Cardiovascular: Normal rate, regular rhythm, S1 normal, S2 normal, normal heart sounds and normal pulses.   No murmur heard.  Pulmonary/Chest: Effort normal and breath sounds normal.   Abdominal: Soft. Normal appearance, normal aorta and bowel sounds are normal. There is no hepatomegaly. There is no tenderness.   Neurological: She is alert and oriented to person, place, and time.   Skin: Skin is warm, dry and intact. Capillary refill takes less than 2 seconds.   Psychiatric: She has a normal mood and affect. Her speech is normal and behavior is normal. Judgment and thought content normal. Cognition and memory are normal.      I was wearing surgical mask during the entire office visit encounter.      Assessment/Plan   Nesha was seen today for hyperlipidemia.    Diagnoses and all orders for this visit:    Mixed hyperlipidemia  -     pravastatin (Pravachol) 20 MG tablet; Take 1 tablet by mouth Daily.    Upper respiratory tract infection, unspecified type  -     fluticasone (FLONASE) 50 MCG/ACT nasal spray; 2 sprays into the nostril(s) as directed by provider Daily.      1. Chronic and stable hyperlipidemia: I have reviewed her lab work that was collected on July 3, 2020 with her at office visit today.  Fasting blood sugar was 91, cholesterol 234, triglycerides 259, HDL 50 and LDL was 132.  These have increased over the past 5 months.  Rest the importance of eating healthy by decreasing carbohydrates and fatty food in diet.  I suspect her diet is the source of her increased results.  She would like to continue her current protocol medication at home and make dietary changes.  I have refilled her Pravachol to pharmacy.  Plan to repeat fasting lab work in 6 months.  If no improvement, will need to increase her statin medication.  2.  Chronic and stable upper respiratory infection: I have refilled her Flonase to pharmacy.  She will use as directed.    [facility]  [facility]  [address] [address]  Dept: [phone]  Dept Fax: [phone]  Loc: [phone]  Loc Fax: [phone]           Orders Only on 07/22/2020   Component Date Value Ref Range Status   • WBC 07/23/2020 4.87  3.40 - 10.80 10*3/mm3 Final   • RBC 07/23/2020 4.26  3.77 - 5.28 10*6/mm3 Final   • Hemoglobin 07/23/2020 13.9  12.0 - 15.9 g/dL Final   • Hematocrit 07/23/2020 39.7  34.0 - 46.6 % Final   • MCV 07/23/2020 93.2  79.0 - 97.0 fL Final   • MCH 07/23/2020 32.6  26.6 - 33.0 pg Final   • MCHC 07/23/2020 35.0  31.5 - 35.7 g/dL Final   • RDW 07/23/2020 12.4  12.3 - 15.4 % Final   • Platelets 07/23/2020 226  140 - 450 10*3/mm3 Final   • Neutrophil Rel % 07/23/2020 59.4  42.7 - 76.0 % Final   • Lymphocyte Rel % 07/23/2020 25.7  19.6 - 45.3 % Final   • Monocyte Rel % 07/23/2020 8.0  5.0 - 12.0 % Final   • Eosinophil Rel % 07/23/2020 5.3  0.3 - 6.2 % Final   • Basophil Rel % 07/23/2020 1.4  0.0 - 1.5 % Final   • Neutrophils Absolute 07/23/2020 2.89  1.70 - 7.00 10*3/mm3 Final   • Lymphocytes Absolute 07/23/2020 1.25  0.70 - 3.10 10*3/mm3 Final   • Monocytes Absolute 07/23/2020 0.39  0.10 - 0.90 10*3/mm3 Final   • Eosinophils Absolute 07/23/2020 0.26  0.00 - 0.40 10*3/mm3 Final   • Basophils Absolute 07/23/2020 0.07  0.00 - 0.20 10*3/mm3 Final   • Immature Granulocyte Rel % 07/23/2020 0.2  0.0 - 0.5 % Final   • Immature Grans Absolute 07/23/2020 0.01  0.00 - 0.05 10*3/mm3 Final   • nRBC 07/23/2020 0.0  0.0 - 0.2 /100 WBC Final   • Glucose 07/23/2020 91  65 - 99 mg/dL Final   • BUN 07/23/2020 13  8 - 23 mg/dL Final   • Creatinine 07/23/2020 0.87  0.57 - 1.00 mg/dL Final   • eGFR Non  Am 07/23/2020 66  >60 mL/min/1.73 Final   • eGFR African Am 07/23/2020 80  >60 mL/min/1.73 Final   • BUN/Creatinine Ratio 07/23/2020 14.9  7.0 - 25.0 Final   • Sodium 07/23/2020 141  136 - 145 mmol/L Final   • Potassium 07/23/2020 4.2  3.5 - 5.2 mmol/L  Final   • Chloride 07/23/2020 104  98 - 107 mmol/L Final   • Total CO2 07/23/2020 28.4  22.0 - 29.0 mmol/L Final   • Calcium 07/23/2020 9.5  8.6 - 10.5 mg/dL Final   • Total Protein 07/23/2020 7.0  6.0 - 8.5 g/dL Final   • Albumin 07/23/2020 4.40  3.50 - 5.20 g/dL Final   • Globulin 07/23/2020 2.6  gm/dL Final   • A/G Ratio 07/23/2020 1.7  g/dL Final   • Total Bilirubin 07/23/2020 0.4  0.0 - 1.2 mg/dL Final   • Alkaline Phosphatase 07/23/2020 55  39 - 117 U/L Final   • AST (SGOT) 07/23/2020 15  1 - 32 U/L Final   • ALT (SGPT) 07/23/2020 19  1 - 33 U/L Final   • Total Cholesterol 07/23/2020 234* 0 - 200 mg/dL Final   • Triglycerides 07/23/2020 259* 0 - 150 mg/dL Final   • HDL Cholesterol 07/23/2020 50  40 - 60 mg/dL Final   • VLDL Cholesterol 07/23/2020 51.8  mg/dL Final   • LDL Cholesterol  07/23/2020 132* 0 - 100 mg/dL Final   • LDL/HDL Ratio 07/23/2020 2.64   Final

## 2020-09-23 ENCOUNTER — OFFICE VISIT (OUTPATIENT)
Dept: FAMILY MEDICINE CLINIC | Facility: CLINIC | Age: 60
End: 2020-09-23

## 2020-09-23 VITALS
DIASTOLIC BLOOD PRESSURE: 82 MMHG | RESPIRATION RATE: 16 BRPM | SYSTOLIC BLOOD PRESSURE: 118 MMHG | WEIGHT: 189 LBS | OXYGEN SATURATION: 98 % | TEMPERATURE: 98.2 F | BODY MASS INDEX: 31.49 KG/M2 | HEIGHT: 65 IN | HEART RATE: 71 BPM

## 2020-09-23 DIAGNOSIS — S39.012A LUMBAR STRAIN, INITIAL ENCOUNTER: Primary | ICD-10-CM

## 2020-09-23 PROCEDURE — 99213 OFFICE O/P EST LOW 20 MIN: CPT | Performed by: PHYSICIAN ASSISTANT

## 2020-09-23 RX ORDER — METAXALONE 800 MG/1
800 TABLET ORAL 3 TIMES DAILY
Qty: 30 TABLET | Refills: 1 | Status: SHIPPED | OUTPATIENT
Start: 2020-09-23 | End: 2021-02-15

## 2020-09-23 RX ORDER — METHYLPREDNISOLONE 4 MG/1
TABLET ORAL
Qty: 21 TABLET | Refills: 0 | Status: SHIPPED | OUTPATIENT
Start: 2020-09-23 | End: 2021-03-11

## 2020-09-23 NOTE — PROGRESS NOTES
Subjective   Nesha Kee is a 59 y.o. female presents for   Chief Complaint   Patient presents with   • Back Pain       History of Present Illness     Nesha is a 59-year-old female who presents onset low back pain.  States she was helping her  paint and wash will stay on September 18, 2020.  States she was sore later that day.  Since this time she has noticed low back pain.  States the pain is small bilateral aspect of low back area.  She has had a history of back issues.  Denied any numbness or tingling going down the legs.  Describes the pain as a constant aching sensation.  Bowel movements have been daily without dark black tarry stools.  Denied any urinary or bowel changes.  She has had no incontinence.  Her mattress is 5 years old.  She has used occasional ibuprofen and applied heat to area which has helped.    The following portions of the patient's history were reviewed and updated as appropriate: allergies, current medications, past family history, past medical history, past social history, past surgical history and problem list.    Review of Systems   Constitutional: Negative.    HENT: Negative.    Eyes: Negative.    Respiratory: Negative.  Negative for cough, chest tightness, shortness of breath and wheezing.    Cardiovascular: Negative.    Gastrointestinal: Negative.  Negative for abdominal pain, anal bleeding, blood in stool, constipation, diarrhea, nausea, rectal pain and vomiting.   Endocrine: Negative.    Genitourinary: Negative.  Negative for dysuria, flank pain, frequency, hematuria and urgency.   Musculoskeletal: Positive for back pain. Negative for gait problem.   Skin: Negative.    Allergic/Immunologic: Negative.    Neurological: Negative.    Hematological: Negative.    Psychiatric/Behavioral: Negative.  Negative for sleep disturbance.   All other systems reviewed and are negative.        Vitals:    09/23/20 1113   BP: 118/82   Pulse: 71   Resp: 16   Temp: 98.2 °F (36.8 °C)   SpO2: 98%  "  Weight: 85.7 kg (189 lb)   Height: 165.1 cm (65\")     Wt Readings from Last 3 Encounters:   09/23/20 85.7 kg (189 lb)   08/03/20 84.8 kg (187 lb)   02/05/20 85.3 kg (188 lb)     BP Readings from Last 3 Encounters:   09/23/20 118/82   08/03/20 118/82   02/05/20 138/88     Social History     Socioeconomic History   • Marital status:      Spouse name: Not on file   • Number of children: Not on file   • Years of education: Not on file   • Highest education level: Not on file   Tobacco Use   • Smoking status: Never Smoker   • Smokeless tobacco: Never Used   Substance and Sexual Activity   • Alcohol use: No   • Drug use: No   • Sexual activity: Defer       Allergies   Allergen Reactions   • Iodinated Diagnostic Agents Rash   • Other Rash     Iv contrast   • Sulfa Antibiotics Rash       Body mass index is 31.45 kg/m².    Objective   Physical Exam  Constitutional:       Appearance: Normal appearance. She is well-developed and well-groomed. She is obese.      Interventions: Face mask in place.   HENT:      Head: Normocephalic and atraumatic.   Neck:      Musculoskeletal: Full passive range of motion without pain.      Trachea: Trachea and phonation normal.   Cardiovascular:      Rate and Rhythm: Normal rate and regular rhythm.      Pulses: Normal pulses.      Heart sounds: Normal heart sounds, S1 normal and S2 normal. No murmur.   Pulmonary:      Effort: Pulmonary effort is normal.      Breath sounds: Normal breath sounds.   Abdominal:      General: Abdomen is flat. Bowel sounds are normal.      Palpations: Abdomen is soft. There is no hepatomegaly.      Tenderness: There is no abdominal tenderness. There is no right CVA tenderness, left CVA tenderness, guarding or rebound. Negative signs include Platt's sign, Rovsing's sign, McBurney's sign, psoas sign and obturator sign.   Musculoskeletal:      Lumbar back: She exhibits tenderness and spasm. She exhibits normal range of motion, no bony tenderness, no swelling, no " pain and normal pulse.        Back:       Comments: Low Back: Tender to palpation along bilateral lumbar spinous muscles.  There was spasms noted in the area as well.  Full range of motion, 5/5 bilateral muscle strength, 2+ DTR's, 2+ distal pulses, negative straight leg raise and good gait/ heel-toe walk noted.   Skin:     General: Skin is warm and dry.      Capillary Refill: Capillary refill takes less than 2 seconds.   Neurological:      General: No focal deficit present.      Mental Status: She is alert and oriented to person, place, and time.      Sensory: Sensation is intact.      Motor: Motor function is intact.      Deep Tendon Reflexes: Reflexes are normal and symmetric.      Reflex Scores:       Patellar reflexes are 2+ on the right side and 2+ on the left side.  Psychiatric:         Attention and Perception: Attention and perception normal.         Mood and Affect: Mood and affect normal.         Speech: Speech normal.         Behavior: Behavior normal. Behavior is cooperative.         Thought Content: Thought content normal.         Cognition and Memory: Cognition and memory normal.         Judgment: Judgment normal.      I was wearing surgical mask during the entire office visit encounter.      Assessment/Plan   Nesha was seen today for back pain.    Diagnoses and all orders for this visit:    Lumbar strain, initial encounter  -     metaxalone (Skelaxin) 800 MG tablet; Take 1 tablet by mouth 3 (Three) Times a Day. As needed for back pain  -     methylPREDNISolone (MEDROL) 4 MG dose pack; Take as directed on package instructions.    Mrs. Nesha Kee was seen in office today and diagnosed with new onset lumbar strain.  I have prescribed a Medrol Dosepak and Skelaxin muscle relaxer to pharmacy.  May continue over-the-counter ibuprofen or Tylenol as needed.  Have given her exercises to do at home as well.  Have asked Nesha did apply moist heat to the area.  She will return to office symptoms do not  improve.      [facility]  [address] [address]  Dept: [phone]  Dept Fax: [phone]  Loc: [phone]  Loc Fax: [phone]

## 2021-02-05 DIAGNOSIS — E55.9 VITAMIN D DEFICIENCY: ICD-10-CM

## 2021-02-05 DIAGNOSIS — E78.2 MIXED HYPERLIPIDEMIA: Primary | ICD-10-CM

## 2021-02-05 DIAGNOSIS — E55.9 VITAMIN D INSUFFICIENCY: ICD-10-CM

## 2021-02-05 DIAGNOSIS — R53.82 CHRONIC FATIGUE: ICD-10-CM

## 2021-02-05 DIAGNOSIS — Z00.00 ENCOUNTER FOR ANNUAL PHYSICAL EXAM: ICD-10-CM

## 2021-02-09 LAB
25(OH)D3+25(OH)D2 SERPL-MCNC: 45.3 NG/ML (ref 30–100)
ALBUMIN SERPL-MCNC: 4.4 G/DL (ref 3.5–5.2)
ALBUMIN/GLOB SERPL: 1.8 G/DL
ALP SERPL-CCNC: 63 U/L (ref 39–117)
ALT SERPL-CCNC: 19 U/L (ref 1–33)
AST SERPL-CCNC: 21 U/L (ref 1–32)
BASOPHILS # BLD AUTO: 0.07 10*3/MM3 (ref 0–0.2)
BASOPHILS NFR BLD AUTO: 1.4 % (ref 0–1.5)
BILIRUB SERPL-MCNC: 0.4 MG/DL (ref 0–1.2)
BUN SERPL-MCNC: 12 MG/DL (ref 8–23)
BUN/CREAT SERPL: 15 (ref 7–25)
CALCIUM SERPL-MCNC: 9.6 MG/DL (ref 8.6–10.5)
CHLORIDE SERPL-SCNC: 101 MMOL/L (ref 98–107)
CHOLEST SERPL-MCNC: 212 MG/DL (ref 0–200)
CO2 SERPL-SCNC: 28.1 MMOL/L (ref 22–29)
CREAT SERPL-MCNC: 0.8 MG/DL (ref 0.57–1)
EOSINOPHIL # BLD AUTO: 0.18 10*3/MM3 (ref 0–0.4)
EOSINOPHIL NFR BLD AUTO: 3.7 % (ref 0.3–6.2)
ERYTHROCYTE [DISTWIDTH] IN BLOOD BY AUTOMATED COUNT: 12.3 % (ref 12.3–15.4)
GLOBULIN SER CALC-MCNC: 2.5 GM/DL
GLUCOSE SERPL-MCNC: 87 MG/DL (ref 65–99)
HCT VFR BLD AUTO: 42.9 % (ref 34–46.6)
HDLC SERPL-MCNC: 54 MG/DL (ref 40–60)
HGB BLD-MCNC: 14.3 G/DL (ref 12–15.9)
IMM GRANULOCYTES # BLD AUTO: 0.01 10*3/MM3 (ref 0–0.05)
IMM GRANULOCYTES NFR BLD AUTO: 0.2 % (ref 0–0.5)
LDLC SERPL CALC-MCNC: 121 MG/DL (ref 0–100)
LDLC/HDLC SERPL: 2.14 {RATIO}
LYMPHOCYTES # BLD AUTO: 1.21 10*3/MM3 (ref 0.7–3.1)
LYMPHOCYTES NFR BLD AUTO: 24.9 % (ref 19.6–45.3)
MCH RBC QN AUTO: 30.9 PG (ref 26.6–33)
MCHC RBC AUTO-ENTMCNC: 33.3 G/DL (ref 31.5–35.7)
MCV RBC AUTO: 92.7 FL (ref 79–97)
MONOCYTES # BLD AUTO: 0.36 10*3/MM3 (ref 0.1–0.9)
MONOCYTES NFR BLD AUTO: 7.4 % (ref 5–12)
NEUTROPHILS # BLD AUTO: 3.02 10*3/MM3 (ref 1.7–7)
NEUTROPHILS NFR BLD AUTO: 62.4 % (ref 42.7–76)
NRBC BLD AUTO-RTO: 0.2 /100 WBC (ref 0–0.2)
PLATELET # BLD AUTO: 262 10*3/MM3 (ref 140–450)
POTASSIUM SERPL-SCNC: 4.2 MMOL/L (ref 3.5–5.2)
PROT SERPL-MCNC: 6.9 G/DL (ref 6–8.5)
RBC # BLD AUTO: 4.63 10*6/MM3 (ref 3.77–5.28)
SODIUM SERPL-SCNC: 137 MMOL/L (ref 136–145)
TRIGL SERPL-MCNC: 213 MG/DL (ref 0–150)
VLDLC SERPL CALC-MCNC: 37 MG/DL (ref 5–40)
WBC # BLD AUTO: 4.85 10*3/MM3 (ref 3.4–10.8)

## 2021-02-09 RX ORDER — MESALAMINE 1.2 G/1
TABLET, DELAYED RELEASE ORAL
Qty: 90 TABLET | Refills: 3 | Status: SHIPPED | OUTPATIENT
Start: 2021-02-09 | End: 2022-05-06 | Stop reason: SDUPTHER

## 2021-02-11 ENCOUNTER — TRANSCRIBE ORDERS (OUTPATIENT)
Dept: ADMINISTRATIVE | Facility: HOSPITAL | Age: 61
End: 2021-02-11

## 2021-02-11 DIAGNOSIS — Z12.31 SCREENING MAMMOGRAM, ENCOUNTER FOR: Primary | ICD-10-CM

## 2021-02-15 ENCOUNTER — HOSPITAL ENCOUNTER (OUTPATIENT)
Dept: GENERAL RADIOLOGY | Facility: HOSPITAL | Age: 61
Discharge: HOME | End: 2021-02-15

## 2021-02-15 ENCOUNTER — OFFICE VISIT (OUTPATIENT)
Dept: FAMILY MEDICINE CLINIC | Facility: CLINIC | Age: 61
End: 2021-02-15

## 2021-02-15 VITALS
HEIGHT: 65 IN | DIASTOLIC BLOOD PRESSURE: 94 MMHG | TEMPERATURE: 97.1 F | WEIGHT: 189 LBS | OXYGEN SATURATION: 98 % | BODY MASS INDEX: 31.49 KG/M2 | HEART RATE: 85 BPM | SYSTOLIC BLOOD PRESSURE: 142 MMHG

## 2021-02-15 DIAGNOSIS — Z00.00 ENCOUNTER FOR ANNUAL PHYSICAL EXAM: Primary | ICD-10-CM

## 2021-02-15 DIAGNOSIS — M25.512 CHRONIC LEFT SHOULDER PAIN: ICD-10-CM

## 2021-02-15 DIAGNOSIS — G89.29 CHRONIC RIGHT HIP PAIN: ICD-10-CM

## 2021-02-15 DIAGNOSIS — G89.29 CHRONIC LEFT SHOULDER PAIN: ICD-10-CM

## 2021-02-15 DIAGNOSIS — R06.09 DOE (DYSPNEA ON EXERTION): ICD-10-CM

## 2021-02-15 DIAGNOSIS — I10 ESSENTIAL HYPERTENSION: ICD-10-CM

## 2021-02-15 DIAGNOSIS — R07.89 CHEST PAIN RADIATING TO UPPER EXTREMITY: ICD-10-CM

## 2021-02-15 DIAGNOSIS — F41.0 GENERALIZED ANXIETY DISORDER WITH PANIC ATTACKS: ICD-10-CM

## 2021-02-15 DIAGNOSIS — M25.551 CHRONIC RIGHT HIP PAIN: ICD-10-CM

## 2021-02-15 DIAGNOSIS — F41.1 GENERALIZED ANXIETY DISORDER WITH PANIC ATTACKS: ICD-10-CM

## 2021-02-15 DIAGNOSIS — E78.2 MIXED HYPERLIPIDEMIA: ICD-10-CM

## 2021-02-15 PROCEDURE — 73030 X-RAY EXAM OF SHOULDER: CPT

## 2021-02-15 PROCEDURE — 93000 ELECTROCARDIOGRAM COMPLETE: CPT | Performed by: PHYSICIAN ASSISTANT

## 2021-02-15 PROCEDURE — 73502 X-RAY EXAM HIP UNI 2-3 VIEWS: CPT

## 2021-02-15 PROCEDURE — 99396 PREV VISIT EST AGE 40-64: CPT | Performed by: PHYSICIAN ASSISTANT

## 2021-02-15 PROCEDURE — 71046 X-RAY EXAM CHEST 2 VIEWS: CPT

## 2021-02-15 RX ORDER — PREDNISONE 50 MG/1
TABLET ORAL
COMMUNITY
Start: 2021-02-13 | End: 2021-03-11

## 2021-02-15 RX ORDER — HYDROXYZINE HYDROCHLORIDE 25 MG/1
25 TABLET, FILM COATED ORAL EVERY 8 HOURS PRN
Qty: 90 TABLET | Refills: 1 | Status: SHIPPED | OUTPATIENT
Start: 2021-02-15

## 2021-02-15 RX ORDER — LISINOPRIL 5 MG/1
5 TABLET ORAL DAILY
Qty: 30 TABLET | Refills: 1 | Status: SHIPPED | OUTPATIENT
Start: 2021-02-15 | End: 2021-03-12

## 2021-02-15 RX ORDER — PRAVASTATIN SODIUM 40 MG
40 TABLET ORAL DAILY
Qty: 90 TABLET | Refills: 2 | Status: SHIPPED | OUTPATIENT
Start: 2021-02-15 | End: 2021-12-30

## 2021-02-15 NOTE — PROGRESS NOTES
I, Dr. Ke Harman, have reviewed the notes, assessments, and/or procedures performed by Sari JEAN-BAPTISTE, that occurred within our practice at [facility] location, I concur with her documentation of Nesha Kee. I have a current collaborative medical agreement with Sari JEAN-BAPTISTE.

## 2021-02-15 NOTE — PROGRESS NOTES
Chief Complaint  Annual Exam, Hyperlipidemia (management), Anxiety, Chest Pain, Headache (Right hip pain), and Shoulder Pain    Subjective          Nesha Kee presents to [facility]  History of Present Illness    Nesha is a 60 year old female who presents for annual physical examination with Hyperlipidemia management.  She has several other complaints as well.  Has been having left shoulder pain and left upper chest pain off and on for the past 4 to 5 months.  Describes the pain as a dull ache that waxes and wanes.  States when she sleeps at night on her left shoulder it causes pain that wakes up and she has to roll over.  Then her right hip hurts.  Her hip pain as a dull ache that waxes and wanes.  Denied any injury or trauma to shoulder or hip.  She has noticed some shortness of breath with exertion.  States when she is climbing stairs or walking on the treadmill she will get short of breath.   has noticed that she gasps during the night.  Denied any snoring or witnessed apnea.  She has had a headache off and on for the past 2 months.  States these are occasionally.  She may have 2-3 headaches monthly.  Denied any fevers, chills, upper respiratory symptoms, wheezing, cough, dizziness, lightheadedness, abdominal pain, GI upset or sputum urinary symptoms.  She may have occasional swelling of ankles at the end of the day.  Her diet has been more cheese and fruits.  Her  is a vegetarian.  She may have sandwiches off and on.  She does walk on the treadmill daily.  Bowel movements have been normal.  She is due for colonoscopy in December 2021 with Dr. Devine.  She denied any dark black tarry stools.  Nesha has had some increased anxiety issues.  States this comes and goes.  She may not have for several days and then will reoccur.  Mainly these are more panic-like.  Denied any suicidal or homicidal ideation.  Does not think she needs something on a daily basis.      "  Objective   Vital Signs:   /94 (BP Location: Left arm, Patient Position: Sitting, Cuff Size: Adult)   Pulse 85   Temp 97.1 °F (36.2 °C)   Ht 165.1 cm (65\")   Wt 85.7 kg (189 lb)   SpO2 98%   BMI 31.45 kg/m²     Physical Exam  Vitals signs and nursing note reviewed.   Constitutional:       Appearance: Normal appearance. She is well-developed and well-groomed. She is obese.      Interventions: Face mask in place.   HENT:      Head: Normocephalic and atraumatic.      Jaw: There is normal jaw occlusion.      Right Ear: Hearing, tympanic membrane, ear canal and external ear normal.      Left Ear: Hearing, tympanic membrane, ear canal and external ear normal.      Nose: Nose normal.      Right Sinus: No maxillary sinus tenderness or frontal sinus tenderness.      Left Sinus: No maxillary sinus tenderness or frontal sinus tenderness.      Mouth/Throat:      Lips: Pink.      Mouth: Mucous membranes are moist.      Tongue: No lesions.      Palate: No mass.      Pharynx: Oropharynx is clear. Uvula midline.   Eyes:      General: Lids are normal.      Conjunctiva/sclera: Conjunctivae normal.      Pupils: Pupils are equal, round, and reactive to light.   Neck:      Musculoskeletal: Neck supple. No edema.      Thyroid: No thyroid mass, thyromegaly or thyroid tenderness.      Vascular: Normal carotid pulses. No carotid bruit, hepatojugular reflux or JVD.      Trachea: Trachea and phonation normal. No tracheal tenderness.   Cardiovascular:      Rate and Rhythm: Normal rate and regular rhythm.      Pulses: Normal pulses.      Heart sounds: Normal heart sounds, S1 normal and S2 normal. No murmur.   Pulmonary:      Effort: Pulmonary effort is normal.      Breath sounds: Normal breath sounds and air entry.   Chest:      Chest wall: Tenderness present.       Abdominal:      General: Bowel sounds are normal.      Palpations: Abdomen is soft. There is no hepatomegaly.      Tenderness: There is no abdominal tenderness. "   Musculoskeletal: Normal range of motion.      Left shoulder: Normal. She exhibits normal range of motion, no tenderness, no bony tenderness, no swelling, no effusion, no crepitus, no pain, no spasm, normal pulse and normal strength.      Right hip: Normal. She exhibits normal range of motion, normal strength, no tenderness, no bony tenderness, no swelling and no crepitus.      Right lower leg: No edema.      Left lower leg: No edema.   Lymphadenopathy:      Cervical: No cervical adenopathy.      Right cervical: No superficial, deep or posterior cervical adenopathy.     Left cervical: No superficial, deep or posterior cervical adenopathy.   Skin:     General: Skin is warm and dry.      Capillary Refill: Capillary refill takes less than 2 seconds.   Neurological:      Mental Status: She is alert and oriented to person, place, and time.      Deep Tendon Reflexes:      Reflex Scores:       Patellar reflexes are 2+ on the right side and 2+ on the left side.  Psychiatric:         Attention and Perception: Attention and perception normal.         Mood and Affect: Affect normal. Mood is anxious.         Speech: Speech normal.         Behavior: Behavior normal. Behavior is cooperative.         Thought Content: Thought content normal.         Cognition and Memory: Cognition and memory normal.         Judgment: Judgment normal.     I was wearing a surgical mask and face shield during the entire office visit/encounter.     Result Review :     Common labs    Common Labsle 7/23/20 7/23/20 7/23/20 2/8/21 2/8/21 2/8/21    0855 0855 0855 0852 0852 0852   Glucose  91   87    BUN  13   12    Creatinine  0.87   0.80    eGFR Non  Am  66   73    eGFR African Am  80   88    Sodium  141   137    Potassium  4.2   4.2    Chloride  104   101    Calcium  9.5   9.6    Total Protein  7.0   6.9    Albumin  4.40   4.40    Total Bilirubin  0.4   0.4    Alkaline Phosphatase  55   63    AST (SGOT)  15   21    ALT (SGPT)  19   19    WBC 4.87    4.85     Hemoglobin 13.9   14.3     Hematocrit 39.7   42.9     Platelets 226   262     Total Cholesterol   234 (A)   212 (A)   Triglycerides   259 (A)   213 (A)   HDL Cholesterol   50   54   LDL Cholesterol    132 (A)   121 (A)   (A) Abnormal value       Comments are available for some flowsheets but are not being displayed.               ECG 12 Lead    Date/Time: 2/15/2021 9:03 AM  Performed by: Sari Yanes PA-C  Authorized by: Sari Yanes PA-C   Comparison: not compared with previous ECG   Rhythm: sinus bradycardia  BPM: 55  Conduction: conduction normal  ST Segments: ST segments normal  T Waves: T waves normal    Clinical impression: abnormal EKG and non-specific ECG  Comments: Indication: Chest pain radiating to left shoulder with AZEVEDO  CT:  144 ms  QRS dur 148 ms  QT/QTc:  428/417 ms                Assessment and Plan    Diagnoses and all orders for this visit:    1. Encounter for annual physical exam (Primary)    2. Mixed hyperlipidemia  -     pravastatin (Pravachol) 40 MG tablet; Take 1 tablet by mouth Daily.  Dispense: 90 tablet; Refill: 2    3. Chest pain radiating to upper extremity  -     XR Chest PA & Lateral; Future  -     ECG 12 Lead  -     Ambulatory Referral to Cardiology    4. AZEVEDO (dyspnea on exertion)  -     XR Chest PA & Lateral; Future  -     Ambulatory Referral to Cardiology    5. Generalized anxiety disorder with panic attacks  -     hydrOXYzine (ATARAX) 25 MG tablet; Take 1 tablet by mouth Every 8 (Eight) Hours As Needed for Anxiety.  Dispense: 90 tablet; Refill: 1    6. Chronic left shoulder pain  -     XR Shoulder 2+ View Left; Future    7. Chronic right hip pain  -     XR hip w or wo pelvis 2-3 view right; Future    8. Essential hypertension  -     lisinopril (PRINIVIL,ZESTRIL) 5 MG tablet; Take 1 tablet by mouth Daily.  Dispense: 30 tablet; Refill: 1      1.  Annual physical examination with hyperlipidemia:  I have reviewed above lab work with her at office visit today.   Cholesterol and Triglyceride readings are still elevated but have improved slightly over the past 6 months.  We will increase pravastatin to 40 mg daily.  2.  New chest pain radiating to left shoulder and dyspnea on exertion: Had EKG in office today that showed sinus bradycardia.  I will proceed with cardiology referral for possible stress test.  Will check chest x-ray today.  Will consider sleep study if normal test results.  3.  New generalized anxiety disorder with panic attacks: I will start hydroxyzine 25 mg to take as needed for panic anxiety.  Will reevaluate at office visit in 1 month.  4.  New essential hypertension: I have rechecked blood pressure at office visit today.  I got 142/94 in left arm and 146/90 in the right arm.  Will start lisinopril 5 mg daily.  Plan to follow-up in 1 month.  5.  Chronic left shoulder pain and right hip pain: I will proceed with left shoulder x-ray and right hip x-ray at the [place] facility today.  Nesha will be notified of test results when completed.  I suspect this may be a bursitis/arthritic in nature.      Follow Up   Return in about 4 weeks (around 3/15/2021), or HTN.  Patient was given instructions and counseling regarding her condition or for health maintenance advice. Please see specific information pulled into the AVS if appropriate.     Patient Counseling:  --Nutrition: Stressed importance of moderation in sodium/caffeine intake, saturated fat and cholesterol.  Discussed caloric balance, sufficient intake of fresh fruits, vegetables, fiber,   calcium, iron.  --Discussed the new recommendation against daily use of baby aspirin for primary prevention in low risk patients.  --Exercise: Stressed the importance of regular exercise by incorporating into daily routine.    --Substance Abuse: Discussed cessation/primary prevention of tobacco, alcohol, or other drug use; driving or other dangerous activities under the influence.    --Dental health: Discussed importance of  regular tooth brushing, flossing, and dental visits.  -- Suggested having eyes and vision checked if needed or past due.  --Immunizations reviewed and up to date.  --Discussed benefits of screening colonoscopy.  Will be due in December 2021.  Sees [facility] [address] [address]  Dept: [phone]  Dept Fax: [phone]  Loc: [phone]  Loc Fax: [phone]

## 2021-02-16 ENCOUNTER — TELEPHONE (OUTPATIENT)
Dept: FAMILY MEDICINE CLINIC | Facility: CLINIC | Age: 61
End: 2021-02-16

## 2021-02-16 NOTE — TELEPHONE ENCOUNTER
NOT ABLE TO WARM TRANSFER CALL:PT CALLED STATED THAT HER LABS THAT WERE DONE ON 2/8/2021 THE CODES WERE IMPROPER AND IN ORDER FOR THE INSURANCE TO COVER 100 PERCENT NEEDS TO HAVE ROUTINE CODE FOR PRIMARY.    PLEASE ADVISE.  CALL BACK:[phone]

## 2021-02-17 NOTE — TELEPHONE ENCOUNTER
Spoke with pt. Informed her that the correct dx codes were attached to her orders. Stated that this may be something [facility] would need to address as it could have been something that happened when submitting to her insurance. Have attempted to call [facility] but not able to get through to anyone.

## 2021-02-19 ENCOUNTER — HOSPITAL ENCOUNTER (OUTPATIENT)
Dept: MAMMOGRAPHY | Facility: HOSPITAL | Age: 61
Discharge: HOME | End: 2021-02-19
Admitting: PHYSICIAN ASSISTANT

## 2021-02-19 ENCOUNTER — APPOINTMENT (OUTPATIENT)
Dept: MAMMOGRAPHY | Facility: HOSPITAL | Age: 61
End: 2021-02-19

## 2021-02-19 DIAGNOSIS — Z12.31 SCREENING MAMMOGRAM, ENCOUNTER FOR: ICD-10-CM

## 2021-02-19 PROCEDURE — 77067 SCR MAMMO BI INCL CAD: CPT

## 2021-02-19 PROCEDURE — 77063 BREAST TOMOSYNTHESIS BI: CPT

## 2021-03-11 ENCOUNTER — OFFICE VISIT (OUTPATIENT)
Dept: CARDIOLOGY | Facility: CLINIC | Age: 61
End: 2021-03-11

## 2021-03-11 VITALS
DIASTOLIC BLOOD PRESSURE: 86 MMHG | SYSTOLIC BLOOD PRESSURE: 138 MMHG | WEIGHT: 188 LBS | HEART RATE: 76 BPM | HEIGHT: 65 IN | BODY MASS INDEX: 31.32 KG/M2

## 2021-03-11 DIAGNOSIS — E78.2 MIXED HYPERLIPIDEMIA: ICD-10-CM

## 2021-03-11 DIAGNOSIS — R06.02 SOB (SHORTNESS OF BREATH): ICD-10-CM

## 2021-03-11 DIAGNOSIS — R94.31 ABNORMAL ECG: Primary | ICD-10-CM

## 2021-03-11 DIAGNOSIS — I10 BENIGN ESSENTIAL HTN: ICD-10-CM

## 2021-03-11 DIAGNOSIS — R07.2 PRECORDIAL PAIN: ICD-10-CM

## 2021-03-11 PROCEDURE — 99204 OFFICE O/P NEW MOD 45 MIN: CPT | Performed by: INTERNAL MEDICINE

## 2021-03-11 NOTE — PROGRESS NOTES
Subjective:     Encounter Date:03/11/21      Patient ID: Nesha Kee is a 60 y.o. female.    Chief Complaint:  History of Present Illness    Dear Sari ALVAREZ,    I had the pleasure of seeing this patient in the office today for initial evaluation and consultation.  I appreciate that you sent her in to see us.  They come in today to be seen for evaluation of episodes of chest discomfort and shortness of breath.    I have seen the patient in the past, but I no longer have any records.  We think it may have been 10 years.  At that point she reports she had a stress test that was normal.    Lately she has been having 2 issues.  She has noted that now when she does activity that she gets short of breath.  She walks up steps she particularly gets short of breath.  Sometimes she feels like she suddenly just will not be able to breathe enough.  She will stop but it will take several minutes before her breathing returns to normal.  She is also been having left-sided chest discomfort.  This is a left upper chest discomfort that radiates into her left shoulder.  She notes this sometimes when she is short of breath but she also has been having at times when she wakes up in the morning.  No change with deep breath or cough, no change when she uses her left arm.  There is no associated nausea, vomiting, or diaphoresis.  She did have a left shoulder x-ray by yourself and that did not show anything.    She has not had any chills or fevers.  No cough, no exposure to anyone known to have COVID-19.    She denies any lower extremity edema.  No orthopnea or PND.  She denies any palpitations or tachycardia no presyncope or syncope.    The following portions of the patient's history were reviewed and updated as appropriate: allergies, current medications, past family history, past medical history, past social history, past surgical history and problem list.    Procedures       Objective:     Vitals:    03/11/21 0802   BP:  "138/86   Pulse: 76   Weight: 85.3 kg (188 lb)   Height: 165.1 cm (65\")         Vitals reviewed.   Constitutional:       General: Not in acute distress.     Appearance: Well-developed. Not diaphoretic.   Eyes:      General:         Right eye: No discharge.         Left eye: No discharge.      Conjunctiva/sclera: Conjunctivae normal.      Pupils: Pupils are equal, round, and reactive to light.   HENT:      Head: Normocephalic and atraumatic.      Nose: Nose normal.   Neck:      Thyroid: No thyromegaly.      Trachea: No tracheal deviation.      Lymphadenopathy: No cervical adenopathy.   Pulmonary:      Effort: Pulmonary effort is normal. No respiratory distress.      Breath sounds: Normal breath sounds. No stridor.   Chest:      Chest wall: Not tender to palpatation.   Cardiovascular:      Normal rate. Regular rhythm.      Murmurs: There is no murmur.      . No S3 gallop. No click. No rub.   Pulses:     Intact distal pulses.   Abdominal:      General: Bowel sounds are normal. There is no distension.      Palpations: Abdomen is soft. There is no abdominal mass.      Tenderness: There is no abdominal tenderness. There is no guarding or rebound.   Musculoskeletal: Normal range of motion.         General: No tenderness or deformity.      Cervical back: Normal range of motion and neck supple. Skin:     General: Skin is warm and dry.      Findings: No erythema or rash.   Neurological:      Mental Status: Alert and oriented to person, place, and time.      Deep Tendon Reflexes: Reflexes are normal and symmetric.   Psychiatric:         Thought Content: Thought content normal.         Data and records reviewed:     Lab Results   Component Value Date    GLUCOSE 93 04/26/2016    BUN 12 02/08/2021    CREATININE 0.80 02/08/2021    EGFRIFNONA 73 02/08/2021    EGFRIFAFRI 88 02/08/2021    BCR 15.0 02/08/2021    K 4.2 02/08/2021    CO2 28.1 02/08/2021    CALCIUM 9.6 02/08/2021    PROTENTOTREF 6.9 02/08/2021    ALBUMIN 4.40 02/08/2021 "    LABIL2 1.8 02/08/2021    AST 21 02/08/2021    ALT 19 02/08/2021     No results found for: CHOL  Lab Results   Component Value Date    TRIG 213 (H) 02/08/2021    TRIG 259 (H) 07/23/2020    TRIG 155 (H) 01/29/2020     Lab Results   Component Value Date    HDL 54 02/08/2021    HDL 50 07/23/2020    HDL 56 01/29/2020     Lab Results   Component Value Date     (H) 02/08/2021     (H) 07/23/2020     (H) 01/29/2020     Lab Results   Component Value Date    VLDL 37 02/08/2021    VLDL 51.8 07/23/2020    VLDL 31 01/29/2020     Lab Results   Component Value Date    LDLHDL 2.14 02/08/2021    LDLHDL 2.64 07/23/2020    LDLHDL 2.00 01/29/2020     CBC    CBC 7/23/20 2/8/21   WBC 4.87 4.85   RBC 4.26 4.63   Hemoglobin 13.9 14.3   Hematocrit 39.7 42.9   MCV 93.2 92.7   MCH 32.6 30.9   MCHC 35.0 33.3   RDW 12.4 12.3   Platelets 226 262           XR Shoulder 2+ View Left    Result Date: 2/15/2021  Negative left shoulder.  This report was finalized on 2/15/2021 9:57 AM by Dr. Shai Dixon MD.      XR Chest PA & Lateral    Result Date: 2/15/2021  Negative chest.  This report was finalized on 2/15/2021 9:57 AM by Dr. Shai Dixon MD.      Mammo Screening Digital Tomosynthesis Bilateral With CAD    Result Date: 2/19/2021  Benign screening mammogram.  BI-RADS CATEGORY 2: Benign Findings.   Women over the age of 40 undergoing screening mammography are entered into a reminder system with target due date for the next mammogram.  This report was finalized on 2/19/2021 4:23 PM by Dr. Jose Caldwell MD.      XR Hip With or Without Pelvis 2 - 3 View Right    Result Date: 2/15/2021  Negative right hip.  This report was finalized on 2/15/2021 9:57 AM by Dr. Shai Dixon MD.      I reviewed your EKG which demonstrates normal sinus rhythm with nonspecific ST and T wave changes in the inferolateral leads        Assessment:          Diagnosis Plan   1. Abnormal ECG  Stress Test With Myocardial Perfusion One Day   2. Benign essential HTN   Stress Test With Myocardial Perfusion One Day   3. Precordial pain  Stress Test With Myocardial Perfusion One Day   4. SOB (shortness of breath)  Stress Test With Myocardial Perfusion One Day          Plan:       1.  Patient is having exertional shortness of breath as well as episodes of chest discomfort, EKG shows inferolateral ST-T wave changes, we will arrange for a stress Cardiolite to be performed  2.  Benign essential hypertension-on lisinopril, we will follow her blood pressure and blood pressure response on the treadmill  3.  Mixed hyperlipidemia on pravastatin, lipids reviewed above.  Thank you very much for allowing us to participate in the care of this pleasant patient.  Please don't hesitate to call if I can be of assistance in any way.      Current Outpatient Medications:   •  Cholecalciferol (VITAMIN D) 2000 units capsule, Take 2,000 Units by mouth Daily., Disp: , Rfl:   •  fluticasone (FLONASE) 50 MCG/ACT nasal spray, 2 sprays into the nostril(s) as directed by provider Daily., Disp: 15.8 mL, Rfl: 12  •  hydrOXYzine (ATARAX) 25 MG tablet, Take 1 tablet by mouth Every 8 (Eight) Hours As Needed for Anxiety., Disp: 90 tablet, Rfl: 1  •  levocetirizine (XYZAL ALLERGY 24HR) 5 MG tablet, Take 1 tablet by mouth Every Evening., Disp: 30 tablet, Rfl: 12  •  lisinopril (PRINIVIL,ZESTRIL) 5 MG tablet, Take 1 tablet by mouth Daily., Disp: 30 tablet, Rfl: 1  •  mesalamine (LIALDA) 1.2 g EC tablet, TAKE ONE TABLET BY MOUTH DAILY WITH BREAKFAST, Disp: 90 tablet, Rfl: 3  •  pravastatin (Pravachol) 40 MG tablet, Take 1 tablet by mouth Daily., Disp: 90 tablet, Rfl: 2  •  Probiotic Product (PROBIOTIC DAILY PO), Take 1 tablet by mouth Daily., Disp: , Rfl:   •  methylPREDNISolone (MEDROL) 4 MG dose pack, Take as directed on package instructions., Disp: 21 tablet, Rfl: 0  •  predniSONE (DELTASONE) 50 MG tablet, TAKE 1 TABLET BY MOUTH 18 HOURS 12 HOURS AND 6 HOURS PRIOR TO STUDY, Disp: , Rfl:          No follow-ups on  file.

## 2021-03-12 ENCOUNTER — OFFICE VISIT (OUTPATIENT)
Dept: FAMILY MEDICINE CLINIC | Facility: CLINIC | Age: 61
End: 2021-03-12

## 2021-03-12 ENCOUNTER — TRANSCRIBE ORDERS (OUTPATIENT)
Dept: OBSTETRICS AND GYNECOLOGY | Facility: CLINIC | Age: 61
End: 2021-03-12

## 2021-03-12 VITALS
BODY MASS INDEX: 31.32 KG/M2 | OXYGEN SATURATION: 96 % | TEMPERATURE: 97.5 F | HEART RATE: 74 BPM | SYSTOLIC BLOOD PRESSURE: 130 MMHG | DIASTOLIC BLOOD PRESSURE: 86 MMHG | HEIGHT: 65 IN | WEIGHT: 188 LBS

## 2021-03-12 DIAGNOSIS — Z01.818 OTHER SPECIFIED PRE-OPERATIVE EXAMINATION: Primary | ICD-10-CM

## 2021-03-12 DIAGNOSIS — I10 ESSENTIAL HYPERTENSION: Primary | ICD-10-CM

## 2021-03-12 PROCEDURE — 99213 OFFICE O/P EST LOW 20 MIN: CPT | Performed by: PHYSICIAN ASSISTANT

## 2021-03-12 RX ORDER — LISINOPRIL 5 MG/1
5 TABLET ORAL DAILY
Qty: 30 TABLET | Refills: 3 | Status: SHIPPED | OUTPATIENT
Start: 2021-03-12 | End: 2021-07-19

## 2021-03-12 NOTE — PROGRESS NOTES
"Chief Complaint  Hypertension (management)    Subjective          Nesha Kee presents to [facility]  History of Present Illness  Nesha is a 60-year-old female who presents for hypertension management.  Nesha saw Dr. Tolbert, cardiologist, yesterday.  She was seen for evaluation I have chest discomfort and shortness of breath.  Blood pressure at that office visit was 138/86 in left arm.  She will be having a stress test next week.  She was encouraged to continue her lisinopril medication.  Nesha states she has an occasional dry cough but does not think it is related to the lisinopril medication.  She still has a slight upper chest pressure.  Denied any chest pain, shortness of air, dizziness, vision changes, headache, wheezing, swelling of ankles.  Her appetite and sleep have been normal.     Objective   Vital Signs:   /86   Pulse 74   Temp 97.5 °F (36.4 °C)   Ht 165.1 cm (65\")   Wt 85.3 kg (188 lb)   SpO2 96%   BMI 31.28 kg/m²     Physical Exam  Vitals and nursing note reviewed.   Constitutional:       Appearance: Normal appearance. She is well-developed and well-groomed. She is obese.      Interventions: Face mask in place.   HENT:      Head: Normocephalic and atraumatic.      Jaw: There is normal jaw occlusion.   Neck:      Thyroid: No thyroid mass, thyromegaly or thyroid tenderness.      Vascular: No carotid bruit.      Trachea: Trachea and phonation normal. No tracheal tenderness.   Cardiovascular:      Rate and Rhythm: Normal rate and regular rhythm.      Pulses: Normal pulses.      Heart sounds: Normal heart sounds, S1 normal and S2 normal. No murmur.   Pulmonary:      Effort: Pulmonary effort is normal.      Breath sounds: Normal breath sounds and air entry.   Abdominal:      General: Bowel sounds are normal.      Palpations: Abdomen is soft. There is no hepatomegaly.      Tenderness: There is no abdominal tenderness.   Musculoskeletal:      Cervical back: Neck " supple.      Right lower leg: No edema.      Left lower leg: No edema.   Skin:     General: Skin is warm and dry.      Capillary Refill: Capillary refill takes less than 2 seconds.   Neurological:      Mental Status: She is alert and oriented to person, place, and time.   Psychiatric:         Attention and Perception: Attention and perception normal.         Mood and Affect: Mood and affect normal.         Speech: Speech normal.         Behavior: Behavior normal. Behavior is cooperative.         Thought Content: Thought content normal.         Cognition and Memory: Cognition and memory normal.         Judgment: Judgment normal.     I was wearing a surgical mask and face shield during the entire office visit/encounter.     Result Review :                 Assessment and Plan    Diagnoses and all orders for this visit:    1. Essential hypertension (Primary)  -     lisinopril (PRINIVIL,ZESTRIL) 5 MG tablet; Take 1 tablet by mouth Daily.  Dispense: 30 tablet; Refill: 3    Mr. Nesha Kee was seen in office today for chronic and stable hypertension: I have rechecked her blood pressure at office visit today and got 120/86 in left arm.  Doing well with the lisinopril medication.  Have sent refills to pharmacy.  Nesha will keep her appointment for stress test next week.  Plan to follow-up in July 2021.      Follow Up   Return in about 4 months (around 7/12/2021).  Patient was given instructions and counseling regarding her condition or for health maintenance advice. Please see specific information pulled into the AVS if appropriate.     [facility]  [address] [place] [id number]  Dept: [phone]  Dept Fax: [phone]  Loc: [phone]  Loc Fax: [phone]        Answers for HPI/ROS submitted by the patient on 3/7/2021  What is the primary reason for your visit?: High Blood Pressure

## 2021-03-15 ENCOUNTER — LAB (OUTPATIENT)
Dept: LAB | Facility: HOSPITAL | Age: 61
End: 2021-03-15

## 2021-03-15 DIAGNOSIS — Z01.818 OTHER SPECIFIED PRE-OPERATIVE EXAMINATION: ICD-10-CM

## 2021-03-15 LAB — SARS-COV-2 RNA PNL SPEC NAA+PROBE: NOT DETECTED

## 2021-03-15 PROCEDURE — 87635 SARS-COV-2 COVID-19 AMP PRB: CPT | Performed by: OBSTETRICS & GYNECOLOGY

## 2021-03-15 PROCEDURE — C9803 HOPD COVID-19 SPEC COLLECT: HCPCS

## 2021-03-17 ENCOUNTER — HOSPITAL ENCOUNTER (OUTPATIENT)
Dept: NUCLEAR MEDICINE | Facility: HOSPITAL | Age: 61
Discharge: HOME | End: 2021-03-17

## 2021-03-17 ENCOUNTER — HOSPITAL ENCOUNTER (OUTPATIENT)
Dept: CARDIOLOGY | Facility: HOSPITAL | Age: 61
Discharge: HOME | End: 2021-03-17

## 2021-03-17 ENCOUNTER — TELEPHONE (OUTPATIENT)
Dept: CARDIOLOGY | Facility: CLINIC | Age: 61
End: 2021-03-17

## 2021-03-17 DIAGNOSIS — R94.31 ABNORMAL ECG: ICD-10-CM

## 2021-03-17 DIAGNOSIS — R07.2 PRECORDIAL PAIN: ICD-10-CM

## 2021-03-17 DIAGNOSIS — I10 BENIGN ESSENTIAL HTN: ICD-10-CM

## 2021-03-17 DIAGNOSIS — R06.02 SOB (SHORTNESS OF BREATH): ICD-10-CM

## 2021-03-17 LAB
BH CV NUCLEAR PRIOR STUDY: 3
BH CV REST NUCLEAR ISOTOPE DOSE: 11.7 MCI
BH CV STRESS BP STAGE 1: NORMAL
BH CV STRESS BP STAGE 2: NORMAL
BH CV STRESS DURATION MIN STAGE 1: 3
BH CV STRESS DURATION MIN STAGE 2: 3
BH CV STRESS DURATION SEC STAGE 1: 0
BH CV STRESS DURATION SEC STAGE 2: 12
BH CV STRESS DURATION SEC STAGE 3: 0
BH CV STRESS GRADE STAGE 1: 10
BH CV STRESS GRADE STAGE 2: 12
BH CV STRESS GRADE STAGE 3: 14
BH CV STRESS HR STAGE 1: 115
BH CV STRESS HR STAGE 2: 146
BH CV STRESS METS STAGE 1: 5
BH CV STRESS METS STAGE 2: 7.5
BH CV STRESS METS STAGE 3: 10
BH CV STRESS NUCLEAR ISOTOPE DOSE: 32.4 MCI
BH CV STRESS PROTOCOL 1: NORMAL
BH CV STRESS RECOVERY BP: NORMAL MMHG
BH CV STRESS SPEED STAGE 1: 1.7
BH CV STRESS SPEED STAGE 2: 2.5
BH CV STRESS SPEED STAGE 3: 3.4
BH CV STRESS STAGE 1: 1
BH CV STRESS STAGE 2: 2
BH CV STRESS STAGE 3: 3
LV EF NUC BP: 81 %
MAXIMAL PREDICTED HEART RATE: 159 BPM
PERCENT MAX PREDICTED HR: 91.82 %
STRESS BASELINE BP: NORMAL MMHG
STRESS O2 SAT REST: 97 %
STRESS PERCENT HR: 108 %
STRESS POST ESTIMATED WORKLOAD: 7.1 METS
STRESS POST EXERCISE DUR MIN: 6 MIN
STRESS POST EXERCISE DUR SEC: 12 SEC
STRESS POST O2 SAT PEAK: 97 %
STRESS POST PEAK BP: NORMAL MMHG
STRESS POST PEAK HR: 146 BPM
STRESS TARGET HR: 135 BPM

## 2021-03-17 PROCEDURE — 0 TECHNETIUM SESTAMIBI: Performed by: INTERNAL MEDICINE

## 2021-03-17 PROCEDURE — 93017 CV STRESS TEST TRACING ONLY: CPT

## 2021-03-17 PROCEDURE — 78452 HT MUSCLE IMAGE SPECT MULT: CPT

## 2021-03-17 PROCEDURE — 93016 CV STRESS TEST SUPVJ ONLY: CPT | Performed by: INTERNAL MEDICINE

## 2021-03-17 PROCEDURE — 93018 CV STRESS TEST I&R ONLY: CPT | Performed by: INTERNAL MEDICINE

## 2021-03-17 PROCEDURE — A9500 TC99M SESTAMIBI: HCPCS | Performed by: INTERNAL MEDICINE

## 2021-03-17 PROCEDURE — 78452 HT MUSCLE IMAGE SPECT MULT: CPT | Performed by: INTERNAL MEDICINE

## 2021-03-17 RX ADMIN — TECHNETIUM TC 99M SESTAMIBI 1 DOSE: 1 INJECTION INTRAVENOUS at 07:50

## 2021-03-17 RX ADMIN — TECHNETIUM TC 99M SESTAMIBI 1 DOSE: 1 INJECTION INTRAVENOUS at 09:52

## 2021-03-17 NOTE — TELEPHONE ENCOUNTER
----- Message from James Tolbert III, MD sent at 3/17/2021  2:58 PM EDT -----  Please call- normal results

## 2021-05-21 ENCOUNTER — TELEPHONE (OUTPATIENT)
Dept: GASTROENTEROLOGY | Facility: CLINIC | Age: 61
End: 2021-05-21

## 2021-06-09 ENCOUNTER — PREP FOR SURGERY (OUTPATIENT)
Dept: SURGERY | Facility: SURGERY CENTER | Age: 61
End: 2021-06-09

## 2021-06-09 DIAGNOSIS — K51.90 ULCERATIVE COLITIS (HCC): ICD-10-CM

## 2021-06-09 DIAGNOSIS — Z83.71 FAMILY HISTORY OF COLONIC POLYPS: ICD-10-CM

## 2021-06-09 DIAGNOSIS — Z12.11 ENCOUNTER FOR SCREENING FOR MALIGNANT NEOPLASM OF COLON: Primary | ICD-10-CM

## 2021-06-09 DIAGNOSIS — Z86.010 PERSONAL HISTORY OF COLONIC POLYPS: ICD-10-CM

## 2021-06-18 PROBLEM — Z12.11 ENCOUNTER FOR SCREENING FOR MALIGNANT NEOPLASM OF COLON: Status: ACTIVE | Noted: 2021-06-18

## 2021-06-18 PROBLEM — Z86.010 PERSONAL HISTORY OF COLONIC POLYPS: Status: ACTIVE | Noted: 2021-06-18

## 2021-06-18 PROBLEM — Z86.0100 PERSONAL HISTORY OF COLONIC POLYPS: Status: ACTIVE | Noted: 2021-06-18

## 2021-06-18 NOTE — TELEPHONE ENCOUNTER
Called and spoke with patient.  Scheduled at [place] on 12/20/2021 at 11:45am - arrive 10:30am.  Will mail instructions.

## 2021-07-08 ENCOUNTER — TELEPHONE (OUTPATIENT)
Dept: FAMILY MEDICINE CLINIC | Facility: CLINIC | Age: 61
End: 2021-07-08

## 2021-07-08 DIAGNOSIS — E78.2 MIXED HYPERLIPIDEMIA: Primary | ICD-10-CM

## 2021-07-08 DIAGNOSIS — E55.9 VITAMIN D INSUFFICIENCY: ICD-10-CM

## 2021-07-08 NOTE — TELEPHONE ENCOUNTER
Caller: Nesha Kee    Relationship: Self    Best call back number: [phone]    What orders are you requesting (i.e. lab or imaging): 6 MO FU LABS    In what timeframe would the patient need to come in: BY 8/9    Where will you receive your lab/imaging services: OFFICE

## 2021-07-17 DIAGNOSIS — I10 ESSENTIAL HYPERTENSION: ICD-10-CM

## 2021-07-19 RX ORDER — LISINOPRIL 5 MG/1
TABLET ORAL
Qty: 30 TABLET | Refills: 0 | Status: SHIPPED | OUTPATIENT
Start: 2021-07-19 | End: 2021-08-16

## 2021-07-28 ENCOUNTER — LAB REQUISITION (OUTPATIENT)
Dept: LAB | Facility: HOSPITAL | Age: 61
End: 2021-07-28

## 2021-07-28 DIAGNOSIS — Z00.00 ENCOUNTER FOR GENERAL ADULT MEDICAL EXAMINATION WITHOUT ABNORMAL FINDINGS: ICD-10-CM

## 2021-07-28 LAB — SARS-COV-2 ORF1AB RESP QL NAA+PROBE: NOT DETECTED

## 2021-07-28 PROCEDURE — U0004 COV-19 TEST NON-CDC HGH THRU: HCPCS | Performed by: OPHTHALMOLOGY

## 2021-08-09 ENCOUNTER — LAB (OUTPATIENT)
Dept: LAB | Facility: HOSPITAL | Age: 61
End: 2021-08-09

## 2021-08-09 ENCOUNTER — TRANSCRIBE ORDERS (OUTPATIENT)
Dept: ADMINISTRATIVE | Facility: HOSPITAL | Age: 61
End: 2021-08-09

## 2021-08-09 DIAGNOSIS — E55.9 AVITAMINOSIS D: ICD-10-CM

## 2021-08-09 DIAGNOSIS — E78.2 MIXED HYPERLIPIDEMIA: Primary | ICD-10-CM

## 2021-08-09 DIAGNOSIS — E78.2 MIXED HYPERLIPIDEMIA: ICD-10-CM

## 2021-08-09 LAB
25(OH)D3 SERPL-MCNC: 41.4 NG/ML
ALBUMIN SERPL-MCNC: 4.5 G/DL (ref 3.5–5.2)
ALBUMIN/GLOB SERPL: 2 G/DL
ALP SERPL-CCNC: 58 U/L (ref 39–117)
ALT SERPL W P-5'-P-CCNC: 16 U/L (ref 1–33)
ANION GAP SERPL CALCULATED.3IONS-SCNC: 7.9 MMOL/L (ref 5–15)
AST SERPL-CCNC: 14 U/L (ref 1–32)
BASOPHILS # BLD AUTO: 0.08 10*3/MM3 (ref 0–0.2)
BASOPHILS NFR BLD AUTO: 1.3 % (ref 0–1.5)
BILIRUB SERPL-MCNC: 0.6 MG/DL (ref 0–1.2)
BUN SERPL-MCNC: 12 MG/DL (ref 8–23)
BUN/CREAT SERPL: 13.6 (ref 7–25)
CALCIUM SPEC-SCNC: 9.3 MG/DL (ref 8.6–10.5)
CHLORIDE SERPL-SCNC: 103 MMOL/L (ref 98–107)
CHOLEST SERPL-MCNC: 208 MG/DL (ref 0–200)
CO2 SERPL-SCNC: 26.1 MMOL/L (ref 22–29)
CREAT SERPL-MCNC: 0.88 MG/DL (ref 0.57–1)
DEPRECATED RDW RBC AUTO: 41.2 FL (ref 37–54)
EOSINOPHIL # BLD AUTO: 0.25 10*3/MM3 (ref 0–0.4)
EOSINOPHIL NFR BLD AUTO: 4.1 % (ref 0.3–6.2)
ERYTHROCYTE [DISTWIDTH] IN BLOOD BY AUTOMATED COUNT: 12 % (ref 12.3–15.4)
GFR SERPL CREATININE-BSD FRML MDRD: 65 ML/MIN/1.73
GLOBULIN UR ELPH-MCNC: 2.2 GM/DL
GLUCOSE SERPL-MCNC: 83 MG/DL (ref 65–99)
HCT VFR BLD AUTO: 42.2 % (ref 34–46.6)
HDLC SERPL-MCNC: 55 MG/DL (ref 40–60)
HGB BLD-MCNC: 14 G/DL (ref 12–15.9)
IMM GRANULOCYTES # BLD AUTO: 0.01 10*3/MM3 (ref 0–0.05)
IMM GRANULOCYTES NFR BLD AUTO: 0.2 % (ref 0–0.5)
LDLC SERPL CALC-MCNC: 123 MG/DL (ref 0–100)
LDLC/HDLC SERPL: 2.17 {RATIO}
LYMPHOCYTES # BLD AUTO: 1.46 10*3/MM3 (ref 0.7–3.1)
LYMPHOCYTES NFR BLD AUTO: 24.2 % (ref 19.6–45.3)
MCH RBC QN AUTO: 31.4 PG (ref 26.6–33)
MCHC RBC AUTO-ENTMCNC: 33.2 G/DL (ref 31.5–35.7)
MCV RBC AUTO: 94.6 FL (ref 79–97)
MONOCYTES # BLD AUTO: 0.55 10*3/MM3 (ref 0.1–0.9)
MONOCYTES NFR BLD AUTO: 9.1 % (ref 5–12)
NEUTROPHILS NFR BLD AUTO: 3.69 10*3/MM3 (ref 1.7–7)
NEUTROPHILS NFR BLD AUTO: 61.1 % (ref 42.7–76)
NRBC BLD AUTO-RTO: 0 /100 WBC (ref 0–0.2)
PLATELET # BLD AUTO: 252 10*3/MM3 (ref 140–450)
PMV BLD AUTO: 9.7 FL (ref 6–12)
POTASSIUM SERPL-SCNC: 4 MMOL/L (ref 3.5–5.2)
PROT SERPL-MCNC: 6.7 G/DL (ref 6–8.5)
RBC # BLD AUTO: 4.46 10*6/MM3 (ref 3.77–5.28)
SODIUM SERPL-SCNC: 137 MMOL/L (ref 136–145)
TRIGL SERPL-MCNC: 169 MG/DL (ref 0–150)
VLDLC SERPL-MCNC: 30 MG/DL (ref 5–40)
WBC # BLD AUTO: 6.04 10*3/MM3 (ref 3.4–10.8)

## 2021-08-09 PROCEDURE — 80061 LIPID PANEL: CPT

## 2021-08-09 PROCEDURE — 36415 COLL VENOUS BLD VENIPUNCTURE: CPT

## 2021-08-09 PROCEDURE — 82306 VITAMIN D 25 HYDROXY: CPT

## 2021-08-09 PROCEDURE — 85025 COMPLETE CBC W/AUTO DIFF WBC: CPT

## 2021-08-09 PROCEDURE — 80053 COMPREHEN METABOLIC PANEL: CPT

## 2021-08-16 ENCOUNTER — OFFICE VISIT (OUTPATIENT)
Dept: FAMILY MEDICINE CLINIC | Facility: CLINIC | Age: 61
End: 2021-08-16

## 2021-08-16 VITALS
BODY MASS INDEX: 31.49 KG/M2 | OXYGEN SATURATION: 98 % | HEART RATE: 73 BPM | SYSTOLIC BLOOD PRESSURE: 138 MMHG | DIASTOLIC BLOOD PRESSURE: 78 MMHG | HEIGHT: 65 IN | WEIGHT: 189 LBS | TEMPERATURE: 98.6 F

## 2021-08-16 DIAGNOSIS — E78.2 MIXED HYPERLIPIDEMIA: Chronic | ICD-10-CM

## 2021-08-16 DIAGNOSIS — R42 DIZZINESS: ICD-10-CM

## 2021-08-16 DIAGNOSIS — I10 ESSENTIAL HYPERTENSION: Primary | ICD-10-CM

## 2021-08-16 PROCEDURE — 99214 OFFICE O/P EST MOD 30 MIN: CPT | Performed by: PHYSICIAN ASSISTANT

## 2021-08-16 PROCEDURE — 93000 ELECTROCARDIOGRAM COMPLETE: CPT | Performed by: PHYSICIAN ASSISTANT

## 2021-08-16 RX ORDER — LISINOPRIL 5 MG/1
5 TABLET ORAL DAILY
Qty: 30 TABLET | Refills: 12 | Status: SHIPPED | OUTPATIENT
Start: 2021-08-16 | End: 2022-08-03

## 2021-08-16 NOTE — PROGRESS NOTES
"Chief Complaint  Hypertension, Hyperlipidemia, and Dizziness (x 5 weeks)    Subjective          Nesha Kee presents to [facility]  History of Present Illness  Nesha is 60 year old female who presents hypertension and hyperlipidemia management.  States she has been dizzy off and on for the past 4-6 weeks.  States this mainly comes with positional changes.  She has noticed when she rollovers in bed she does get dizzy.  Sometimes she does have vertigo.  When she is rising from a bending position she has noticed that she gets dizzy.  Denied any fevers, chills, upper respiratory symptoms, headache, GI upset, chest pain, shortness of air, wheezing, or swelling of ankles.  Denied any recent head injury or trauma.  Diet has been improving.  States they do more plant-based at home.  She has noticed that she does eat more carbs though.  Has not been as active.  Sleep has been normal except for the dizziness at times.     Objective   Vital Signs:   /78   Pulse 73   Temp 98.6 °F (37 °C)   Ht 165.1 cm (65\")   Wt 85.7 kg (189 lb)   SpO2 98%   BMI 31.45 kg/m²     Vitals:    08/16/21 1608 08/16/21 1609 08/16/21 1610   Orthostatic BP: 120/82 140/100 140/90   Orthostatic Pulse: 60 56 58   Patient Position: Lying Sitting Standing     Physical Exam  Vitals and nursing note reviewed.   Constitutional:       Appearance: Normal appearance. She is well-developed and well-groomed. She is obese.      Interventions: Face mask in place.   HENT:      Head: Normocephalic and atraumatic.      Jaw: There is normal jaw occlusion.      Right Ear: Hearing, tympanic membrane, ear canal and external ear normal.      Left Ear: Hearing, tympanic membrane, ear canal and external ear normal.      Nose: Nose normal.      Right Sinus: No maxillary sinus tenderness or frontal sinus tenderness.      Left Sinus: No maxillary sinus tenderness or frontal sinus tenderness.      Mouth/Throat:      Lips: Pink.      " Mouth: Mucous membranes are moist.      Dentition: Normal dentition.      Tongue: No lesions.      Pharynx: Oropharynx is clear. Uvula midline.      Tonsils: No tonsillar exudate.   Eyes:      General: Lids are normal.      Conjunctiva/sclera: Conjunctivae normal.      Pupils: Pupils are equal, round, and reactive to light.   Neck:      Thyroid: No thyroid mass, thyromegaly or thyroid tenderness.      Vascular: No carotid bruit.      Trachea: Trachea and phonation normal. No tracheal tenderness.   Cardiovascular:      Rate and Rhythm: Normal rate and regular rhythm.      Pulses: Normal pulses.      Heart sounds: Normal heart sounds, S1 normal and S2 normal. No murmur heard.     Pulmonary:      Effort: Pulmonary effort is normal.      Breath sounds: Normal breath sounds and air entry.   Abdominal:      General: Bowel sounds are normal.      Palpations: Abdomen is soft.      Tenderness: There is no abdominal tenderness. There is no right CVA tenderness, left CVA tenderness, guarding or rebound. Negative signs include Platt's sign, Rovsing's sign, McBurney's sign, psoas sign and obturator sign.   Musculoskeletal:      Cervical back: Neck supple.      Right lower leg: No edema.      Left lower leg: No edema.   Lymphadenopathy:      Cervical: No cervical adenopathy.      Right cervical: No superficial, deep or posterior cervical adenopathy.     Left cervical: No superficial, deep or posterior cervical adenopathy.   Skin:     General: Skin is warm and dry.      Capillary Refill: Capillary refill takes less than 2 seconds.   Neurological:      Mental Status: She is alert and oriented to person, place, and time.   Psychiatric:         Attention and Perception: Attention and perception normal.         Mood and Affect: Mood and affect normal.         Speech: Speech normal.         Behavior: Behavior is cooperative.         Thought Content: Thought content normal.         Cognition and Memory: Cognition and memory normal.          Judgment: Judgment normal.     I was wearing a surgical mask and face shield during the entire office visit/encounter.     Result Review :     CMP    CMP 2/8/21 8/9/21   Glucose  83   Glucose 87    BUN 12 12   Creatinine 0.80 0.88   eGFR Non  Am 73 65   eGFR African Am 88    Sodium 137 137   Potassium 4.2 4.0   Chloride 101 103   Calcium 9.6 9.3   Total Protein 6.9    Albumin 4.40 4.50   Globulin 2.5    Total Bilirubin 0.4 0.6   Alkaline Phosphatase 63 58   AST (SGOT) 21 14   ALT (SGPT) 19 16      Comments are available for some flowsheets but are not being displayed.           CBC w/diff    CBC w/Diff 2/8/21 8/9/21   WBC 4.85 6.04   RBC 4.63 4.46   Hemoglobin 14.3 14.0   Hematocrit 42.9 42.2   MCV 92.7 94.6   MCH 30.9 31.4   MCHC 33.3 33.2   RDW 12.3 12.0 (A)   Platelets 262 252   Neutrophil Rel % 62.4 61.1   Immature Granulocyte Rel %  0.2   Lymphocyte Rel % 24.9 24.2   Monocyte Rel % 7.4 9.1   Eosinophil Rel % 3.7 4.1   Basophil Rel % 1.4 1.3   (A) Abnormal value            Lipid Panel    Lipid Panel 2/8/21 8/9/21   Total Cholesterol  208 (A)   Total Cholesterol 212 (A)    Triglycerides 213 (A) 169 (A)   HDL Cholesterol 54 55   VLDL Cholesterol 37 30   LDL Cholesterol  121 (A) 123 (A)   LDL/HDL Ratio 2.14 2.17   (A) Abnormal value       Comments are available for some flowsheets but are not being displayed.                    ECG 12 Lead    Date/Time: 8/16/2021 5:51 PM  Performed by: Sari Yanes PA-C  Authorized by: Sari Yanes PA-C   Comparison: compared with previous ECG from 2/15/2021  Similar to previous ECG  Rhythm: sinus bradycardia  Rate: normal  BPM: 50  Conduction: conduction normal  QRS axis: normal  Other findings comments: nonspecific T Wave abnormality  Comments: Indication:  Dizziness with hypertension  PA int:  139 ms  QRS dur:  89 ms  QT/QTc:  432/407 ms                Assessment and Plan    Diagnoses and all orders for this visit:    1. Essential hypertension  (Primary)  -     lisinopril (PRINIVIL,ZESTRIL) 5 MG tablet; Take 1 tablet by mouth Daily.  Dispense: 30 tablet; Refill: 12  -     ECG 12 Lead    2. Mixed hyperlipidemia    3. Dizziness  -     ECG 12 Lead    1.  Chronic hypertension with new dizziness: In office EKG shows sinus bradycardia with similar EKG from February 2021.  I have rechecked blood pressure at office visit today and got 130/82 in left arm.  She was instructed to increase fluid intake and to drink Gatorade once daily to see if this helps improve her symptoms.  She will update status in 2-4 weeks.  Will consider referral to her cardiologist if no improvement.  Physical exam was unremarkable.  I have sent a refill of lisinopril medication to pharmacy.  2.  Chronic and stable hyperlipidemia: Have reviewed her blood work from above.  Cholesterol readings have improved.  Nesha will continue her current pravastatin medication at home as directed.  She will continue to make healthy choices and eating.  Nesha will return to office in 6 months for annual physical examination.    Follow Up   No follow-ups on file.  Patient was given instructions and counseling regarding her condition or for health maintenance advice. Please see specific information pulled into the AVS if appropriate.     [facility]  [address] [place] [id number]  Dept: [phone]  Dept Fax: [phone]  Loc: [phone]  Loc Fax: [phone]        Answers for HPI/ROS submitted by the patient on 8/15/2021  Please describe your symptoms.: 6 month check of cholesterol and blood pressure., , Have been experiencing dizziness for a month, not all the time, do want to discuss this.  Have you had these symptoms before?: Yes  How long have you been having these symptoms?: Greater than 2 weeks  Please list any medications you are currently taking for this condition.: Pravastatin. 40 mg, Lisinopril 5  mg  Please describe any probable cause for these symptoms. : Not sure  What is the primary reason for your visit?: Other

## 2021-08-20 DIAGNOSIS — J06.9 UPPER RESPIRATORY TRACT INFECTION, UNSPECIFIED TYPE: ICD-10-CM

## 2021-08-20 RX ORDER — FLUTICASONE PROPIONATE 50 MCG
SPRAY, SUSPENSION (ML) NASAL
Qty: 16 G | Refills: 3 | Status: SHIPPED | OUTPATIENT
Start: 2021-08-20 | End: 2022-03-23

## 2021-09-22 ENCOUNTER — OFFICE VISIT (OUTPATIENT)
Dept: FAMILY MEDICINE CLINIC | Facility: CLINIC | Age: 61
End: 2021-09-22

## 2021-09-22 VITALS
BODY MASS INDEX: 31.45 KG/M2 | DIASTOLIC BLOOD PRESSURE: 98 MMHG | TEMPERATURE: 98 F | OXYGEN SATURATION: 98 % | HEART RATE: 78 BPM | SYSTOLIC BLOOD PRESSURE: 130 MMHG | HEIGHT: 65 IN

## 2021-09-22 DIAGNOSIS — Z20.822 ENCOUNTER FOR SCREENING LABORATORY TESTING FOR COVID-19 VIRUS: ICD-10-CM

## 2021-09-22 DIAGNOSIS — R09.81 HEAD CONGESTION: ICD-10-CM

## 2021-09-22 DIAGNOSIS — R05.9 COUGH: Primary | ICD-10-CM

## 2021-09-22 PROCEDURE — 99213 OFFICE O/P EST LOW 20 MIN: CPT | Performed by: PHYSICIAN ASSISTANT

## 2021-09-22 RX ORDER — AMOXICILLIN 875 MG/1
875 TABLET, COATED ORAL 2 TIMES DAILY
Qty: 20 TABLET | Refills: 0 | Status: SHIPPED | OUTPATIENT
Start: 2021-09-22 | End: 2021-10-02

## 2021-09-22 NOTE — PROGRESS NOTES
"Chief Complaint  Cough (hurts chest to cough and chest tightness), Earache (off and on), Fever, and Nausea    Subjective          Nesha Kee presents to [facility]  History of Present Illness  Nesha is a 60-year-old female who presents with new cough, chest tightness, ear pain, fever and nausea for the past 5-6 days.  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  She has been trying to wear a mask.  She is not vaccinated against Covid.  Temperature has been as high as 100.4.  Nesha states her cough is dry and deep.  She has had some head congestion with ear pain and postnasal drip.  Denied any sore throat.  Has been using over-the-counter Dayquil, Nyquil and Robitussin with some relief.  Denied any headache, sinus pressure, nausea, vomiting, diarrhea, loss of taste or smell, dizziness, vomiting or diarrhea.     Objective   Vital Signs:   /98   Pulse 78   Temp 98 °F (36.7 °C)   Ht 165.1 cm (65\")   SpO2 98%   BMI 31.45 kg/m²     Physical Exam  Vitals and nursing note reviewed.   Constitutional:       Appearance: Normal appearance. She is well-developed and well-groomed. She is obese.      Interventions: Face mask in place.      Comments: Looks slightly ill   HENT:      Head: Normocephalic and atraumatic.      Jaw: There is normal jaw occlusion.      Right Ear: Hearing, tympanic membrane, ear canal and external ear normal.      Left Ear: Hearing, tympanic membrane, ear canal and external ear normal.      Nose: Nose normal.      Right Sinus: No maxillary sinus tenderness or frontal sinus tenderness.      Left Sinus: No maxillary sinus tenderness or frontal sinus tenderness.      Mouth/Throat:      Lips: Pink.      Mouth: Mucous membranes are moist.      Dentition: Normal dentition.      Tongue: No lesions. Tongue does not deviate from midline.      Palate: No mass and lesions.      Pharynx: Oropharynx is clear. Uvula midline.      Tonsils: No " tonsillar exudate.   Eyes:      General: Lids are normal.      Conjunctiva/sclera: Conjunctivae normal.      Pupils: Pupils are equal, round, and reactive to light.   Neck:      Thyroid: No thyromegaly.      Vascular: No carotid bruit.      Trachea: Trachea and phonation normal. No tracheal tenderness.   Cardiovascular:      Rate and Rhythm: Normal rate and regular rhythm.      Pulses: Normal pulses.      Heart sounds: Normal heart sounds, S1 normal and S2 normal. No murmur heard.     Pulmonary:      Effort: Pulmonary effort is normal.      Breath sounds: Normal breath sounds and air entry.   Abdominal:      General: Bowel sounds are normal.      Palpations: Abdomen is soft.      Tenderness: There is no abdominal tenderness. There is no right CVA tenderness, left CVA tenderness, guarding or rebound. Negative signs include Platt's sign, Rovsing's sign, McBurney's sign, psoas sign and obturator sign.   Musculoskeletal:      Cervical back: Neck supple.      Right lower leg: No edema.      Left lower leg: No edema.   Lymphadenopathy:      Cervical: No cervical adenopathy.      Right cervical: No superficial, deep or posterior cervical adenopathy.     Left cervical: No superficial, deep or posterior cervical adenopathy.   Skin:     General: Skin is warm and dry.      Capillary Refill: Capillary refill takes less than 2 seconds.   Neurological:      Mental Status: She is alert and oriented to person, place, and time.   Psychiatric:         Attention and Perception: Attention and perception normal.         Mood and Affect: Mood and affect normal.         Speech: Speech normal.         Behavior: Behavior is cooperative.         Thought Content: Thought content normal.         Cognition and Memory: Cognition and memory normal.         Judgment: Judgment normal.     Patient was wearing a mask when I enter room.  I enter the room wearing hair covering, face shield,N 95 mask and gown/gloves.  Appropriate PPE was worn by  medical assistant and myself during the office encounter.  I washed hands thoroughly after leaving the patient's room after removal of PPE.   Result Review :                 Assessment and Plan    Diagnoses and all orders for this visit:    1. Cough (Primary)  -     COVID-19,[facility] ROUTINE, NP/OP SWAB IN TRANSPORT MEDIA OR ESWAB 72 HR TAT - Swab, Anterior nasal  -     QUESTIONNAIRE SERIES  -     amoxicillin (AMOXIL) 875 MG tablet; Take 1 tablet by mouth 2 (Two) Times a Day for 10 days.  Dispense: 20 tablet; Refill: 0    2. Head congestion  -     COVID-19,[facility] ROUTINE, NP/OP SWAB IN TRANSPORT MEDIA OR ESWAB 72 HR TAT - Swab, Anterior nasal  -     QUESTIONNAIRE SERIES  -     amoxicillin (AMOXIL) 875 MG tablet; Take 1 tablet by mouth 2 (Two) Times a Day for 10 days.  Dispense: 20 tablet; Refill: 0    3. Encounter for screening laboratory testing for COVID-19 virus  -     COVID-19,[facility] ROUTINE, NP/OP SWAB IN TRANSPORT MEDIA OR ESWAB 72 HR TAT - Swab, Anterior nasal  -     QUESTIONNAIRE SERIES  -     amoxicillin (AMOXIL) 875 MG tablet; Take 1 tablet by mouth 2 (Two) Times a Day for 10 days.  Dispense: 20 tablet; Refill: 0    Nesha was seen in office today with cough and  head congestion with screening for COVID-19.  Covid test was collected and sent to the laboratory for further evaluation.  She is unvaccinated and will quarantine for 14 days after onset of symptoms.  Have provided a return to work note for 10/6/2021.  Instructed to increase fluid intake and rest as much as possible.  If symptoms consistent to pharmacy in case of upper respiratory infections.  We will continue her current Robitussin and allergy medications at home as directed.  If her symptoms worsen, she will notify the office or proceed to the ER.      Follow Up   No follow-ups on file.  Patient was given instructions and counseling regarding her condition or for health maintenance advice. Please see specific information pulled into the AVS if  appropriate.     [facility]  [address] [place] [id number]  Dept: [phone]  Dept Fax: [phone]  Loc: [phone]  Loc Fax: [phone]

## 2021-09-22 NOTE — PATIENT INSTRUCTIONS
"COVID-19: Quarantine vs. Isolation  QUARANTINE keeps someone who was in close contact with someone who has COVID-19 away from others.  If you had close contact with a person who has COVID-19  · The best way to protect yourself and others is to stay home for 14 days after your last contact. Check your local health department's website for information about options in your area to possibly shorten this quarantine period.  · Check your temperature twice a day and watch for symptoms of COVID-19.  · If possible, stay away from people who are at higher-risk for getting very sick from COVID-19.  ISOLATION keeps someone who is sick or tested positive for COVID-19 without symptoms away from others, even in their own home.  If you are sick and think or know you have COVID-19  · Stay home until after  ? At least 10 days since symptoms first appeared and  ? At least 24 hours with no fever without fever-reducing medication and  ? Symptoms have improved  If you tested positive for COVID-19 but do not have symptoms  · Stay home until after  ? 10 days have passed since your positive test  If you live with others, stay in a specific \"sick room\" or area and away from other people or animals, including pets. Use a separate bathroom, if available.  cdc.gov/coronavirus  12/17/2020  This information is not intended to replace advice given to you by your health care provider. Make sure you discuss any questions you have with your health care provider.  Document Revised: 07/13/2021 Document Reviewed: 07/13/2021  Elsevier Patient Education © 2021 [employer]    "

## 2021-09-23 ENCOUNTER — TELEPHONE (OUTPATIENT)
Dept: FAMILY MEDICINE CLINIC | Facility: CLINIC | Age: 61
End: 2021-09-23

## 2021-09-23 DIAGNOSIS — R05.9 COUGH: Primary | ICD-10-CM

## 2021-09-23 DIAGNOSIS — I10 ESSENTIAL HYPERTENSION: ICD-10-CM

## 2021-09-23 DIAGNOSIS — E78.2 MIXED HYPERLIPIDEMIA: ICD-10-CM

## 2021-09-23 DIAGNOSIS — U07.1 COVID-19 VIRUS DETECTED: ICD-10-CM

## 2021-09-23 PROBLEM — I15.9 SECONDARY HYPERTENSION: Status: ACTIVE | Noted: 2021-09-23

## 2021-09-23 LAB
LABCORP SARS-COV-2, NAA 2 DAY TAT: NORMAL
SARS-COV-2 RNA RESP QL NAA+PROBE: DETECTED

## 2021-09-23 RX ORDER — METHYLPREDNISOLONE SODIUM SUCCINATE 125 MG/2ML
125 INJECTION, POWDER, LYOPHILIZED, FOR SOLUTION INTRAMUSCULAR; INTRAVENOUS AS NEEDED
OUTPATIENT
Start: 2021-09-23

## 2021-09-23 RX ORDER — DIPHENHYDRAMINE HYDROCHLORIDE 50 MG/ML
50 INJECTION INTRAMUSCULAR; INTRAVENOUS ONCE AS NEEDED
OUTPATIENT
Start: 2021-09-23

## 2021-09-23 RX ORDER — DIPHENHYDRAMINE HCL 25 MG
50 TABLET ORAL ONCE AS NEEDED
OUTPATIENT
Start: 2021-09-23

## 2021-09-23 RX ORDER — EPINEPHRINE 1 MG/ML
0.3 INJECTION, SOLUTION, CONCENTRATE INTRAVENOUS AS NEEDED
OUTPATIENT
Start: 2021-09-23

## 2021-09-23 RX ORDER — SODIUM CHLORIDE 9 MG/ML
30 INJECTION, SOLUTION INTRAVENOUS ONCE
OUTPATIENT
Start: 2021-09-23

## 2021-09-23 NOTE — TELEPHONE ENCOUNTER
I spoke with mamie on 9/23/21 at 5:20 pm. I have discussed her positive COVID test. She wants to receive the Regen infusion.       The FDA has issued an Emergency Use Authorization (EUA) to permit emergency use of the unapproved product REGEN-COV for treatment of laboratory confirmed COVID-19 in certain ambulatory patients. There is no prescribing information or package insert, however, the FDA has authorized distribution of Fact Sheets for patients and/or caregivers for additional information on this investigational therapy. I have provided the Fact Sheet to and discussed the following with the patient and family and he/she agreed to proceed:  · FDA has authorized the emergency use (EUA) of REGEN-COV which is not an FDA approved drug.  · The patient and family has the option to accept or refuse REGEN-COV at any time.  · The significant known and potential risks and benefits of REGEN-COV and the extent to which such risks and benefits are unknown.  · Information on available alternative treatments and the risks and benefits of those alternatives have been shared.  [facility]  [address] [place] [id number]  Dept: [phone]  Dept Fax: [phone]  Loc: [phone]  Loc Fax: [phone]

## 2021-09-24 ENCOUNTER — HOSPITAL ENCOUNTER (OUTPATIENT)
Dept: INFUSION THERAPY | Facility: HOSPITAL | Age: 61
Discharge: HOME | End: 2021-09-24
Admitting: PHYSICIAN ASSISTANT

## 2021-09-24 VITALS
SYSTOLIC BLOOD PRESSURE: 123 MMHG | OXYGEN SATURATION: 96 % | BODY MASS INDEX: 30.82 KG/M2 | DIASTOLIC BLOOD PRESSURE: 78 MMHG | WEIGHT: 185 LBS | HEIGHT: 65 IN | HEART RATE: 61 BPM | RESPIRATION RATE: 16 BRPM

## 2021-09-24 DIAGNOSIS — E66.9 OBESITY (BMI 30.0-34.9): ICD-10-CM

## 2021-09-24 DIAGNOSIS — I15.9 SECONDARY HYPERTENSION: ICD-10-CM

## 2021-09-24 DIAGNOSIS — U07.1 COVID-19 VIRUS DETECTED: Primary | ICD-10-CM

## 2021-09-24 DIAGNOSIS — E78.2 MIXED HYPERLIPIDEMIA: ICD-10-CM

## 2021-09-24 PROCEDURE — 25010000006 INJECTION, CASIRIVIMAB AND IMDEVIMAB, 1200 MG: Performed by: PHYSICIAN ASSISTANT

## 2021-09-24 PROCEDURE — M0243 CASIRIVI AND IMDEVI INFUSION: HCPCS | Performed by: PHYSICIAN ASSISTANT

## 2021-09-24 RX ORDER — EPINEPHRINE 1 MG/ML
0.3 INJECTION, SOLUTION INTRAMUSCULAR; SUBCUTANEOUS AS NEEDED
OUTPATIENT
Start: 2021-09-24

## 2021-09-24 RX ORDER — DIPHENHYDRAMINE HYDROCHLORIDE 50 MG/ML
50 INJECTION INTRAMUSCULAR; INTRAVENOUS ONCE AS NEEDED
OUTPATIENT
Start: 2021-09-24

## 2021-09-24 RX ORDER — SODIUM CHLORIDE 9 MG/ML
30 INJECTION, SOLUTION INTRAVENOUS ONCE
OUTPATIENT
Start: 2021-09-24

## 2021-09-24 RX ORDER — DIPHENHYDRAMINE HCL 50 MG
50 CAPSULE ORAL ONCE AS NEEDED
OUTPATIENT
Start: 2021-09-24

## 2021-09-24 RX ORDER — METHYLPREDNISOLONE SODIUM SUCCINATE 125 MG/2ML
125 INJECTION, POWDER, LYOPHILIZED, FOR SOLUTION INTRAMUSCULAR; INTRAVENOUS AS NEEDED
OUTPATIENT
Start: 2021-09-24

## 2021-09-24 RX ADMIN — CASIRIVIMAB AND IMDEVIMAB: 600; 600 INJECTION, SOLUTION, CONCENTRATE INTRAVENOUS at 15:12

## 2021-09-24 NOTE — NURSING NOTE
Patient arrived and vital signs and history taken. IV placed and infusion initiated. Patient monitored for one hour. Vital signs stable and patient tolerated well. Fact sheet given AVS declined.

## 2021-11-30 DIAGNOSIS — Z01.818 OTHER SPECIFIED PRE-OPERATIVE EXAMINATION: Primary | ICD-10-CM

## 2021-12-17 ENCOUNTER — APPOINTMENT (OUTPATIENT)
Dept: LAB | Facility: HOSPITAL | Age: 61
End: 2021-12-17

## 2021-12-17 ENCOUNTER — ANESTHESIA EVENT (OUTPATIENT)
Dept: PERIOP | Facility: HOSPITAL | Age: 61
End: 2021-12-17

## 2021-12-20 ENCOUNTER — ANESTHESIA (OUTPATIENT)
Dept: PERIOP | Facility: HOSPITAL | Age: 61
End: 2021-12-20

## 2021-12-20 ENCOUNTER — HOSPITAL ENCOUNTER (OUTPATIENT)
Facility: HOSPITAL | Age: 61
Setting detail: HOSPITAL OUTPATIENT SURGERY
Discharge: HOME | End: 2021-12-20
Attending: INTERNAL MEDICINE | Admitting: INTERNAL MEDICINE

## 2021-12-20 VITALS
HEART RATE: 58 BPM | TEMPERATURE: 97.3 F | HEIGHT: 65 IN | SYSTOLIC BLOOD PRESSURE: 119 MMHG | BODY MASS INDEX: 30.96 KG/M2 | DIASTOLIC BLOOD PRESSURE: 82 MMHG | OXYGEN SATURATION: 97 % | RESPIRATION RATE: 16 BRPM | WEIGHT: 185.8 LBS

## 2021-12-20 DIAGNOSIS — Z86.010 PERSONAL HISTORY OF COLONIC POLYPS: ICD-10-CM

## 2021-12-20 DIAGNOSIS — K51.90 ULCERATIVE COLITIS (HCC): ICD-10-CM

## 2021-12-20 DIAGNOSIS — Z12.11 ENCOUNTER FOR SCREENING FOR MALIGNANT NEOPLASM OF COLON: ICD-10-CM

## 2021-12-20 PROCEDURE — 88305 TISSUE EXAM BY PATHOLOGIST: CPT | Performed by: INTERNAL MEDICINE

## 2021-12-20 PROCEDURE — 45380 COLONOSCOPY AND BIOPSY: CPT | Performed by: INTERNAL MEDICINE

## 2021-12-20 PROCEDURE — 25010000002 PROPOFOL 10 MG/ML EMULSION: Performed by: NURSE ANESTHETIST, CERTIFIED REGISTERED

## 2021-12-20 RX ORDER — SODIUM CHLORIDE 9 MG/ML
40 INJECTION, SOLUTION INTRAVENOUS AS NEEDED
Status: DISCONTINUED | OUTPATIENT
Start: 2021-12-20 | End: 2021-12-20 | Stop reason: HOSPADM

## 2021-12-20 RX ORDER — SODIUM CHLORIDE 0.9 % (FLUSH) 0.9 %
10 SYRINGE (ML) INJECTION EVERY 12 HOURS SCHEDULED
Status: DISCONTINUED | OUTPATIENT
Start: 2021-12-20 | End: 2021-12-20 | Stop reason: HOSPADM

## 2021-12-20 RX ORDER — SODIUM CHLORIDE, SODIUM LACTATE, POTASSIUM CHLORIDE, CALCIUM CHLORIDE 600; 310; 30; 20 MG/100ML; MG/100ML; MG/100ML; MG/100ML
9 INJECTION, SOLUTION INTRAVENOUS CONTINUOUS
Status: DISCONTINUED | OUTPATIENT
Start: 2021-12-20 | End: 2021-12-20 | Stop reason: HOSPADM

## 2021-12-20 RX ORDER — LIDOCAINE HYDROCHLORIDE 20 MG/ML
INJECTION, SOLUTION INFILTRATION; PERINEURAL AS NEEDED
Status: DISCONTINUED | OUTPATIENT
Start: 2021-12-20 | End: 2021-12-20 | Stop reason: SURG

## 2021-12-20 RX ORDER — SODIUM CHLORIDE 0.9 % (FLUSH) 0.9 %
10 SYRINGE (ML) INJECTION AS NEEDED
Status: DISCONTINUED | OUTPATIENT
Start: 2021-12-20 | End: 2021-12-20 | Stop reason: HOSPADM

## 2021-12-20 RX ORDER — LIDOCAINE HYDROCHLORIDE 10 MG/ML
0.5 INJECTION, SOLUTION EPIDURAL; INFILTRATION; INTRACAUDAL; PERINEURAL ONCE AS NEEDED
Status: DISCONTINUED | OUTPATIENT
Start: 2021-12-20 | End: 2021-12-20 | Stop reason: HOSPADM

## 2021-12-20 RX ORDER — ONDANSETRON 2 MG/ML
4 INJECTION INTRAMUSCULAR; INTRAVENOUS ONCE AS NEEDED
Status: DISCONTINUED | OUTPATIENT
Start: 2021-12-20 | End: 2021-12-20 | Stop reason: HOSPADM

## 2021-12-20 RX ORDER — PROPOFOL 10 MG/ML
VIAL (ML) INTRAVENOUS AS NEEDED
Status: DISCONTINUED | OUTPATIENT
Start: 2021-12-20 | End: 2021-12-20 | Stop reason: SURG

## 2021-12-20 RX ORDER — SODIUM CHLORIDE, SODIUM LACTATE, POTASSIUM CHLORIDE, CALCIUM CHLORIDE 600; 310; 30; 20 MG/100ML; MG/100ML; MG/100ML; MG/100ML
100 INJECTION, SOLUTION INTRAVENOUS CONTINUOUS
Status: DISCONTINUED | OUTPATIENT
Start: 2021-12-20 | End: 2021-12-20 | Stop reason: HOSPADM

## 2021-12-20 RX ORDER — DIPHENHYDRAMINE HYDROCHLORIDE 50 MG/ML
12.5 INJECTION INTRAMUSCULAR; INTRAVENOUS
Status: DISCONTINUED | OUTPATIENT
Start: 2021-12-20 | End: 2021-12-20 | Stop reason: HOSPADM

## 2021-12-20 RX ADMIN — PROPOFOL 50 MG: 10 INJECTION, EMULSION INTRAVENOUS at 10:20

## 2021-12-20 RX ADMIN — SODIUM CHLORIDE, POTASSIUM CHLORIDE, SODIUM LACTATE AND CALCIUM CHLORIDE 9 ML/HR: 600; 310; 30; 20 INJECTION, SOLUTION INTRAVENOUS at 09:26

## 2021-12-20 RX ADMIN — PROPOFOL 50 MG: 10 INJECTION, EMULSION INTRAVENOUS at 10:10

## 2021-12-20 RX ADMIN — PROPOFOL 50 MG: 10 INJECTION, EMULSION INTRAVENOUS at 10:05

## 2021-12-20 RX ADMIN — LIDOCAINE HYDROCHLORIDE 80 MG: 20 INJECTION, SOLUTION INFILTRATION; PERINEURAL at 10:01

## 2021-12-20 RX ADMIN — PROPOFOL 50 MG: 10 INJECTION, EMULSION INTRAVENOUS at 10:15

## 2021-12-20 RX ADMIN — PROPOFOL 100 MG: 10 INJECTION, EMULSION INTRAVENOUS at 10:01

## 2021-12-20 NOTE — OP NOTE
COLONOSCOPY  Procedure Report    Patient Name:  Nesha Kee  YOB: 1960    Date of Surgery:  12/20/2021     Indications:  Encounter for screening for malignant neoplasm of colon [Z12.11]  Ulcerative colitis (CMS/HCC) [K51.90]  Personal history of colonic polyps [Z86.010]      Pre-op Diagnosis:   Encounter for screening for malignant neoplasm of colon [Z12.11]  Ulcerative colitis (CMS/HCC) [K51.90]  Personal history of colonic polyps [Z86.010]    Post-Op Diagnosis Codes:     * Encounter for screening for malignant neoplasm of colon [Z12.11]     * Ulcerative colitis (HCC) [K51.90]     * Personal history of colonic polyps [Z86.010]     * Diverticulosis [K57.90]         Procedure/CPT® Codes:      Procedure(s):  COLONOSCOPY WITH BIOPSIES    Staff:  Surgeon(s):  Jonathan Devine MD         Anesthesia: Monitored Anesthesia Care    Estimated Blood Loss: none    Specimens:   ID Type Source Tests Collected by Time   A : BX FOR UC Tissue Large Intestine, Right / Ascending Colon TISSUE PATHOLOGY EXAM Jonathan Devine MD 12/20/2021 1013   B : BX FOR UC Tissue Large Intestine, Sigmoid Colon TISSUE PATHOLOGY EXAM Jonathan Devine MD 12/20/2021 1018   C : BX FOR UC Tissue Large Intestine, Transverse Colon TISSUE PATHOLOGY EXAM Jonathan Devine MD 12/20/2021 1015   D : BX FOR UC Tissue Large Intestine, Rectum TISSUE PATHOLOGY EXAM Jonathan Devine MD 12/20/2021 1020       Implants:    Nothing was implanted during the procedure      Description of Procedure: After having signed informed consent, she was brought to the endoscopy suite and placed in left lateral decubitus position and given her IV sedation. Rectal exam revealed no external lesions, normal anal tone, no rectal mass. The scope was introduced in the rectum and advanced under direct visualization with ease through the rectum, sigmoid colon, past multiple diverticular openings that extended throughout  the length of the colon. The scope was advanced easily through the well prepped colon, around the hepatic flexure, through the ascending colon and into the cecum. The cecum was identified by appendiceal orifice and the ileocecal valve. The ileocecal valve was intubated. The terminal ileum was normal appearing. The scope was withdrawn back into the ascending colon, biopsies were taken of the cecum, ascending colon, hepatic flexure, and labeled right colon. Biopsies were taken of transverse and descending colon and labeled transverse colon. Biopsies were taken separately of the sigmoid colon and then of the rectum. The rectum did contain at least 2 hyperplastic appearing polyps in the random biopsies. The scope was taken from the patient. She tolerated the procedure very well.          Findings: Colon to TI good Prep  Pan-Colonic Diverticulosis  Micro-Colitis Biopsy (R,T,S,R)     Complications: None    Recommendations: Results to be called. and Repeat in 3 years      Jonathan Devine MD     Date: 12/20/2021  Time: 10:29 EST

## 2021-12-20 NOTE — ANESTHESIA PREPROCEDURE EVALUATION
Anesthesia Evaluation     Patient summary reviewed and Nursing notes reviewed   no history of anesthetic complications:  NPO Solid Status: > 8 hours  NPO Liquid Status: > 8 hours           Airway   Mallampati: II  TM distance: >3 FB  Neck ROM: full  No difficulty expected  Dental - normal exam     Pulmonary     breath sounds clear to auscultation  (+) recent URI ( in sept) resolved,   Cardiovascular   Exercise tolerance: good (4-7 METS)    Rhythm: regular  Rate: normal    (+) hypertension well controlled less than 2 medications, hyperlipidemia,       Neuro/Psych  (+) dizziness/light headedness, psychiatric history Anxiety,     (-) headaches, numbness  GI/Hepatic/Renal/Endo    (+) obesity,   thyroid problem     Musculoskeletal     (-) neck pain  Abdominal   (+) obese,    Substance History - negative use     OB/GYN negative ob/gyn ROS         Other   arthritis,                      Anesthesia Plan    ASA 2     MAC     intravenous induction     Anesthetic plan, all risks, benefits, and alternatives have been provided, discussed and informed consent has been obtained with: patient.  Use of blood products discussed with patient  Consented to blood products.

## 2021-12-20 NOTE — ANESTHESIA POSTPROCEDURE EVALUATION
Patient: Nesha Kee    Procedure Summary     Date: 12/20/21 Room / Location: [facility] ENDOSCOPY 1 /  [facility]    Anesthesia Start: 1000 Anesthesia Stop: 1026    Procedure: COLONOSCOPY WITH BIOPSIES (N/A ) Diagnosis:       Encounter for screening for malignant neoplasm of colon      Ulcerative colitis (HCC)      Personal history of colonic polyps      Diverticulosis      (Encounter for screening for malignant neoplasm of colon [Z12.11])      (Ulcerative colitis (CMS/HCC) [K51.90])      (Personal history of colonic polyps [Z86.010])    Surgeons: Jonathan Devine MD Provider: Joe Guadalupe CRNA    Anesthesia Type: MAC ASA Status: 2          Anesthesia Type: MAC    Vitals  Vitals Value Taken Time   /86 12/20/21 1029   Temp 97.3 °F (36.3 °C) 12/20/21 1029   Pulse 58 12/20/21 1049   Resp 16 12/20/21 1049   SpO2 97 % 12/20/21 1049           Post Anesthesia Care and Evaluation    Patient location during evaluation: PHASE II  Patient participation: complete - patient participated  Level of consciousness: awake  Pain score: 0  Pain management: adequate  Airway patency: patent  Anesthetic complications: No anesthetic complications  PONV Status: none  Cardiovascular status: acceptable  Respiratory status: acceptable  Hydration status: acceptable

## 2021-12-20 NOTE — BRIEF OP NOTE
COLONOSCOPY  Progress Note    Nesha Kee  12/20/2021    Pre-op Diagnosis:   Encounter for screening for malignant neoplasm of colon [Z12.11]  Ulcerative colitis (CMS/HCC) [K51.90]  Personal history of colonic polyps [Z86.010]       Post-Op Diagnosis Codes:     * Encounter for screening for malignant neoplasm of colon [Z12.11]     * Ulcerative colitis (HCC) [K51.90]     * Personal history of colonic polyps [Z86.010]     * Diverticulosis [K57.90]    Procedure/CPT® Codes:        Procedure(s):  COLONOSCOPY WITH BIOPSIES    Surgeon(s):  Jonathan Devine MD    Anesthesia: Monitored Anesthesia Care    Staff:   Circulator: Sosa Johnson RN  Scrub Person: Sosa Mg         Estimated Blood Loss: none    Urine Voided: * No values recorded between 12/20/2021  9:59 AM and 12/20/2021 10:23 AM *    Specimens:                Specimens     ID Source Type Tests Collected By Collected At Frozen?    A Large Intestine, Right / Ascending Colon Tissue · TISSUE PATHOLOGY EXAM   Jonathan Devine MD 12/20/21 1013     Description: BX FOR UC    B Large Intestine, Sigmoid Colon Tissue · TISSUE PATHOLOGY EXAM   Jonathan Devine MD 12/20/21 1018     Description: BX FOR UC    C Large Intestine, Transverse Colon Tissue · TISSUE PATHOLOGY EXAM   Jonathan Devine MD 12/20/21 1015     Description: BX FOR UC    D Large Intestine, Rectum Tissue · TISSUE PATHOLOGY EXAM   Jonathan Devine MD 12/20/21 1020     Description: BX FOR UC                Drains: * No LDAs found *    Findings: Colon to TI good Prep  Pan-Colonic Diverticulosis  Micro-Colitis Biopsy (R,T,S,R)    Complications: None          Jonathan Devine MD     Date: 12/20/2021  Time: 10:27 EST

## 2021-12-21 LAB
LAB AP CASE REPORT: NORMAL
PATH REPORT.FINAL DX SPEC: NORMAL
PATH REPORT.GROSS SPEC: NORMAL

## 2021-12-30 DIAGNOSIS — E78.2 MIXED HYPERLIPIDEMIA: ICD-10-CM

## 2021-12-30 RX ORDER — PRAVASTATIN SODIUM 40 MG
TABLET ORAL
Qty: 90 TABLET | Refills: 0 | Status: SHIPPED | OUTPATIENT
Start: 2021-12-30 | End: 2022-08-03

## 2022-01-10 DIAGNOSIS — E78.2 MIXED HYPERLIPIDEMIA: Primary | ICD-10-CM

## 2022-01-10 DIAGNOSIS — I10 ESSENTIAL HYPERTENSION: ICD-10-CM

## 2022-01-10 DIAGNOSIS — Z01.84 IMMUNITY STATUS TESTING: ICD-10-CM

## 2022-01-10 DIAGNOSIS — R53.82 CHRONIC FATIGUE: ICD-10-CM

## 2022-02-18 ENCOUNTER — OFFICE VISIT (OUTPATIENT)
Dept: FAMILY MEDICINE CLINIC | Facility: CLINIC | Age: 62
End: 2022-02-18

## 2022-02-18 VITALS
HEART RATE: 86 BPM | OXYGEN SATURATION: 97 % | TEMPERATURE: 97.8 F | BODY MASS INDEX: 30.12 KG/M2 | WEIGHT: 180.8 LBS | DIASTOLIC BLOOD PRESSURE: 82 MMHG | RESPIRATION RATE: 14 BRPM | HEIGHT: 65 IN | SYSTOLIC BLOOD PRESSURE: 120 MMHG

## 2022-02-18 DIAGNOSIS — Z00.00 ENCOUNTER FOR ANNUAL PHYSICAL EXAM: Primary | ICD-10-CM

## 2022-02-18 DIAGNOSIS — E78.2 MIXED HYPERLIPIDEMIA: Chronic | ICD-10-CM

## 2022-02-18 DIAGNOSIS — I15.9 SECONDARY HYPERTENSION: ICD-10-CM

## 2022-02-18 DIAGNOSIS — Z12.31 SCREENING MAMMOGRAM FOR BREAST CANCER: ICD-10-CM

## 2022-02-18 PROCEDURE — 99396 PREV VISIT EST AGE 40-64: CPT | Performed by: PHYSICIAN ASSISTANT

## 2022-02-18 NOTE — PROGRESS NOTES
"Chief Complaint  Annual Exam, Hypertension (Management), and Hyperlipidemia (Management)    Subjective          Nesha Kee presents to [facility]  History of Present Illness  Is a 61-year-old female who presents for annual physical examination with hypertension and hyperlipidemia management. Nesha states she has stopped all of her medication.a month ago. States she kept forgetting to take it in the morning and then would forget to take it at night. She has been trying to make healthier choices by eating  and decreasing portion sizes, carbohydrates, and sugar in diet. Has also decreased her portion sizes. She has been walking more at work as well. Nesha has lost 4 pounds since her last office visit. Has been monitoring blood pressure at home and has been getting in the 120's/70s. Bowel movements have been daily without dark black tarry stools. Has had a partial hysterectomy. Does breast exams monthly. Denied any fevers, chills, upper respiratory symptoms, chest pain, shortness of air, cough, wheezing, shortness of breath, nausea, vomiting, diarrhea, urinary symptoms, vaginal discharge or swelling of ankles.  Review of Systems   All other systems reviewed and are negative.    Objective   Vital Signs:   /82 (BP Location: Left arm, Patient Position: Sitting, Cuff Size: Adult)   Pulse 86   Temp 97.8 °F (36.6 °C) (Infrared)   Resp 14   Ht 165.1 cm (65\")   Wt 82 kg (180 lb 12.8 oz)   SpO2 97%   BMI 30.09 kg/m²     Physical Exam  Vitals and nursing note reviewed. Exam conducted with a chaperone present.   Constitutional:       Appearance: Normal appearance. She is well-developed and well-groomed. She is obese.      Interventions: Face mask in place.   HENT:      Head: Normocephalic and atraumatic.      Jaw: There is normal jaw occlusion.      Right Ear: Hearing, tympanic membrane, ear canal and external ear normal.      Left Ear: Hearing, tympanic membrane, ear canal " and external ear normal.      Nose: Nose normal.      Right Sinus: No maxillary sinus tenderness or frontal sinus tenderness.      Left Sinus: No maxillary sinus tenderness or frontal sinus tenderness.      Mouth/Throat:      Lips: Pink.      Mouth: Mucous membranes are moist.      Dentition: Normal dentition.      Tongue: No lesions.      Pharynx: Oropharynx is clear. Uvula midline.      Tonsils: No tonsillar exudate.   Eyes:      General: Lids are normal.      Conjunctiva/sclera: Conjunctivae normal.      Pupils: Pupils are equal, round, and reactive to light.   Neck:      Thyroid: No thyroid mass, thyromegaly or thyroid tenderness.      Vascular: No carotid bruit.      Trachea: Trachea and phonation normal. No tracheal tenderness.   Cardiovascular:      Rate and Rhythm: Normal rate and regular rhythm.      Pulses: Normal pulses.      Heart sounds: Normal heart sounds, S1 normal and S2 normal. No murmur heard.      Pulmonary:      Effort: Pulmonary effort is normal.      Breath sounds: Normal breath sounds and air entry.   Chest:   Breasts:      Right: Normal. No axillary adenopathy.      Left: Normal. No axillary adenopathy.        Comments: Exam chaperoned by Ami Arthur MA  Abdominal:      General: Bowel sounds are normal.      Palpations: Abdomen is soft.      Tenderness: There is no abdominal tenderness.      Hernia: There is no hernia in the left inguinal area or right inguinal area.   Genitourinary:     General: Normal vulva.      Labia:         Right: No rash, tenderness, lesion or injury.         Left: No rash, tenderness, lesion or injury.       Urethra: No prolapse, urethral pain, urethral swelling or urethral lesion.      Vagina: Normal.      Uterus: Absent.       Adnexa: Right adnexa normal and left adnexa normal.      Comments: Exam chaperoned by Ami Arthur MA  Cervix/uterus are absent due to partial hysterectomy  Musculoskeletal:      Cervical back: Neck supple.      Right lower leg: No  edema.      Left lower leg: No edema.   Lymphadenopathy:      Cervical: No cervical adenopathy.      Right cervical: No superficial, deep or posterior cervical adenopathy.     Left cervical: No superficial, deep or posterior cervical adenopathy.      Upper Body:      Right upper body: No axillary adenopathy.      Left upper body: No axillary adenopathy.      Lower Body: No right inguinal adenopathy. No left inguinal adenopathy.   Skin:     General: Skin is warm and dry.      Capillary Refill: Capillary refill takes less than 2 seconds.   Neurological:      Mental Status: She is alert and oriented to person, place, and time.      Deep Tendon Reflexes:      Reflex Scores:       Patellar reflexes are 2+ on the right side and 2+ on the left side.  Psychiatric:         Attention and Perception: Attention and perception normal.         Mood and Affect: Mood and affect normal.         Speech: Speech normal.         Behavior: Behavior is cooperative.         Thought Content: Thought content normal.         Cognition and Memory: Cognition and memory normal.         Judgment: Judgment normal.     I wore a facial mask, face shield, and gloves during this patient encounter.  Patient also wearing a surgical mask.  Hand hygiene performed before and after seeing the patient.     Result Review :     CMP    CMP 8/9/21 2/11/22   Glucose 83 92   BUN 12 16   Creatinine 0.88 0.89   eGFR Non  Am 65 70   eGFR African Am  80   Sodium 137 141   Potassium 4.0 4.7   Chloride 103 104   Calcium 9.3 9.4   Total Protein  6.7   Albumin 4.50 4.4   Globulin  2.3   Total Bilirubin 0.6 0.4   Alkaline Phosphatase 58 50   AST (SGOT) 14 18   ALT (SGPT) 16 19      Comments are available for some flowsheets but are not being displayed.           CBC w/diff    CBC w/Diff 8/9/21 2/11/22   WBC 6.04 4.4   RBC 4.46 4.22   Hemoglobin 14.0 13.4   Hematocrit 42.2 39.7   MCV 94.6 94   MCH 31.4 31.8   MCHC 33.2 33.8   RDW 12.0 (A) 11.8   Platelets 252 234    Neutrophil Rel % 61.1 57   Immature Granulocyte Rel % 0.2    Lymphocyte Rel % 24.2 28   Monocyte Rel % 9.1 10   Eosinophil Rel % 4.1 4   Basophil Rel % 1.3 1   (A) Abnormal value            Lipid Panel    Lipid Panel 8/9/21 2/11/22   Total Cholesterol 208 (A)    Total Cholesterol  200 (A)   Triglycerides 169 (A) 77   HDL Cholesterol 55 62   VLDL Cholesterol 30 14   LDL Cholesterol  123 (A) 124 (A)   LDL/HDL Ratio 2.17 2.0   (A) Abnormal value       Comments are available for some flowsheets but are not being displayed.           TSH    TSH 2/11/22   TSH 0.821                         Assessment and Plan    Diagnoses and all orders for this visit:    1. Encounter for annual physical exam (Primary)    2. Secondary hypertension    3. Mixed hyperlipidemia    4. Screening mammogram for breast cancer  -     Mammo Screening Digital Tomosynthesis Bilateral With CAD; Future     1.  Annual physical exam with Hyperlipidemia management.  Have reviewed above blood work with her at office visit today.  She has made healthier choices by decreasing fatty food, carbohydrates and sugars in diet.  She is also increasing her walking.  We will reevaluate fasting lab work in 1 year.  2.  Chronic and resolved hypertension: Blood pressure was in therapeutic range.  Has not had her medication in several months.  She has been trying to control her blood pressure with diet and exercise.  We will hold medication for now.  I have asked her to keep a blood pressure log at home.  3.  Need for screening mammogram: I have placed orders for mammogram at [facility].    Patient Counseling:  --Nutrition: Stressed importance of moderation in sodium/caffeine intake, saturated fat and cholesterol.  Discussed caloric balance, sufficient intake of fresh fruits, vegetables, fiber,   calcium, iron.  --Discussed the new recommendation against daily use of baby aspirin for primary prevention in low risk patients.  --Exercise: Stressed the importance of  regular exercise by incorporating into daily routine.    --Substance Abuse: Discussed cessation/primary prevention of tobacco, alcohol, or other drug use; driving or other dangerous activities under the influence.    --Dental health: Discussed importance of regular tooth brushing, flossing, and dental visits.  -- Suggested having eyes and vision checked if needed or past due.  --Immunizations reviewed.  --Discussed benefits of screening colonoscopy.        Follow Up   Return in about 1 year (around 2/18/2023), or labs prior, for Annual.  Patient was given instructions and counseling regarding her condition or for health maintenance advice. Please see specific information pulled into the AVS if appropriate.     [facility]  [address] [place] [id number]  Dept: [phone]  Dept Fax: [phone]  Loc: [phone]  Loc Fax: [phone]

## 2022-02-25 ENCOUNTER — HOSPITAL ENCOUNTER (OUTPATIENT)
Dept: MAMMOGRAPHY | Facility: HOSPITAL | Age: 62
Discharge: HOME | End: 2022-02-25
Admitting: PHYSICIAN ASSISTANT

## 2022-02-25 DIAGNOSIS — Z12.31 SCREENING MAMMOGRAM FOR BREAST CANCER: ICD-10-CM

## 2022-02-25 PROCEDURE — 77063 BREAST TOMOSYNTHESIS BI: CPT

## 2022-02-25 PROCEDURE — 77067 SCR MAMMO BI INCL CAD: CPT

## 2022-03-23 DIAGNOSIS — J06.9 UPPER RESPIRATORY TRACT INFECTION, UNSPECIFIED TYPE: ICD-10-CM

## 2022-03-23 RX ORDER — FLUTICASONE PROPIONATE 50 MCG
SPRAY, SUSPENSION (ML) NASAL
Qty: 16 G | Refills: 6 | Status: SHIPPED | OUTPATIENT
Start: 2022-03-23

## 2022-05-06 ENCOUNTER — TELEPHONE (OUTPATIENT)
Dept: GASTROENTEROLOGY | Facility: CLINIC | Age: 62
End: 2022-05-06

## 2022-05-06 DIAGNOSIS — K51.00 ULCERATIVE PANCOLITIS WITHOUT COMPLICATION: Primary | ICD-10-CM

## 2022-05-06 RX ORDER — MESALAMINE 1.2 G/1
1.2 TABLET, DELAYED RELEASE ORAL
Qty: 90 TABLET | Refills: 0 | Status: SHIPPED | OUTPATIENT
Start: 2022-05-06 | End: 2022-08-03

## 2022-05-06 NOTE — TELEPHONE ENCOUNTER
Refilled Mesalamine for 90 days only.  Last colonoscopy 12/20/21  Last OV:  Not on Epic    I attempted to schedule patient for overdue f/u on her UC, but she states that you never see her outside of her colonoscopy.  Please advise.

## 2022-08-03 ENCOUNTER — LAB (OUTPATIENT)
Dept: LAB | Facility: HOSPITAL | Age: 62
End: 2022-08-03

## 2022-08-03 ENCOUNTER — OFFICE VISIT (OUTPATIENT)
Dept: GASTROENTEROLOGY | Facility: CLINIC | Age: 62
End: 2022-08-03

## 2022-08-03 VITALS
WEIGHT: 178.2 LBS | BODY MASS INDEX: 29.69 KG/M2 | DIASTOLIC BLOOD PRESSURE: 62 MMHG | SYSTOLIC BLOOD PRESSURE: 108 MMHG | HEIGHT: 65 IN

## 2022-08-03 DIAGNOSIS — Z86.010 PERSONAL HISTORY OF COLONIC POLYPS: ICD-10-CM

## 2022-08-03 DIAGNOSIS — R14.2 ERUCTATION: Primary | ICD-10-CM

## 2022-08-03 DIAGNOSIS — K51.00 ULCERATIVE PANCOLITIS WITHOUT COMPLICATION: ICD-10-CM

## 2022-08-03 LAB
ALBUMIN SERPL-MCNC: 4.4 G/DL (ref 3.5–5.2)
ALBUMIN/GLOB SERPL: 1.9 G/DL
ALP SERPL-CCNC: 54 U/L (ref 39–117)
ALT SERPL W P-5'-P-CCNC: 25 U/L (ref 1–33)
ANION GAP SERPL CALCULATED.3IONS-SCNC: 8.6 MMOL/L (ref 5–15)
AST SERPL-CCNC: 22 U/L (ref 1–32)
BILIRUB SERPL-MCNC: 0.7 MG/DL (ref 0–1.2)
BUN SERPL-MCNC: 11 MG/DL (ref 8–23)
BUN/CREAT SERPL: 12.5 (ref 7–25)
CALCIUM SPEC-SCNC: 9.5 MG/DL (ref 8.6–10.5)
CHLORIDE SERPL-SCNC: 105 MMOL/L (ref 98–107)
CO2 SERPL-SCNC: 26.4 MMOL/L (ref 22–29)
CREAT SERPL-MCNC: 0.88 MG/DL (ref 0.57–1)
EGFRCR SERPLBLD CKD-EPI 2021: 74.4 ML/MIN/1.73
GLOBULIN UR ELPH-MCNC: 2.3 GM/DL
GLUCOSE SERPL-MCNC: 83 MG/DL (ref 65–99)
LIPASE SERPL-CCNC: 20 U/L (ref 13–60)
POTASSIUM SERPL-SCNC: 4.1 MMOL/L (ref 3.5–5.2)
PROT SERPL-MCNC: 6.7 G/DL (ref 6–8.5)
SODIUM SERPL-SCNC: 140 MMOL/L (ref 136–145)

## 2022-08-03 PROCEDURE — 83690 ASSAY OF LIPASE: CPT | Performed by: INTERNAL MEDICINE

## 2022-08-03 PROCEDURE — 99214 OFFICE O/P EST MOD 30 MIN: CPT | Performed by: INTERNAL MEDICINE

## 2022-08-03 PROCEDURE — 36415 COLL VENOUS BLD VENIPUNCTURE: CPT | Performed by: INTERNAL MEDICINE

## 2022-08-03 PROCEDURE — 87338 HPYLORI STOOL AG IA: CPT | Performed by: INTERNAL MEDICINE

## 2022-08-03 PROCEDURE — 80053 COMPREHEN METABOLIC PANEL: CPT | Performed by: INTERNAL MEDICINE

## 2022-08-03 RX ORDER — MESALAMINE 1.2 G/1
2.4 TABLET, DELAYED RELEASE ORAL
Qty: 180 TABLET | Refills: 3 | Status: SHIPPED | OUTPATIENT
Start: 2022-08-03

## 2022-08-03 RX ORDER — OMEPRAZOLE 20 MG/1
20 CAPSULE, DELAYED RELEASE ORAL DAILY
Qty: 90 CAPSULE | Refills: 3 | Status: SHIPPED | OUTPATIENT
Start: 2022-08-03

## 2022-08-03 NOTE — PROGRESS NOTES
PATIENT INFORMATION  Nesha Kee       - 1960    CHIEF COMPLAINT  Chief Complaint   Patient presents with   • Follow-up     Ulcerative colitis, Belching        HISTORY OF PRESENT ILLNESS  Onlly issue is increased belching but no bloating and happens after drinking an at night.    Her Bowels are unaffected and 1-2 a day      REVIEWED PERTINENT RESULTS/ LABS  Lab Results   Component Value Date    CASEREPORT  2021     Surgical Pathology Report                         Case: [id number]                                  Authorizing Provider:  Jonathan Devine        Collected:           2021 10:13 AM                                 MD Shaila                                                                   Ordering Location:     [facility]   Received:            2021 10:33 AM                                 OR                                                                           Pathologist:           Trino Amaya MD                                                         Specimens:   1) - Large Intestine, Right / Ascending Colon, BX FOR UC                                            2) - Large Intestine, Sigmoid Colon, BX FOR UC                                                      3) - Large Intestine, Transverse Colon, BX FOR UC                                                   4) - Large Intestine, Rectum, BX FOR UC                                                    FINALDX  2021     1. Right Ascending Colon Biopsy: Colonic mucosa with    A. Minimal hyperplastic change with normal crypt architecture.   B. No active inflammation nor granulomatous inflammation identified.   C. No glandular dysplasia nor malignancy identified.    2. Sigmoid Colon Biopsy: Colonic mucosa with    A. Normal crypt architecture.   B. No active inflammation nor granulomatous inflammation identified.    3. Transverse Colon Biopsy:   A. Colonic mucosa with developing lymphoid  aggregate.  B. No active inflammation nor granulomatous inflammation identified.    4. Rectum Biopsy: Colonic mucosa with   A. Relatively normal crypt architecture with mild hyperplastic change noted.  B. No active inflammation nor granulomatous inflammation identified.  C. No glandular dysplasia nor malignancy identified.    jat/jse        Lab Results   Component Value Date    HGB 13.4 02/11/2022    MCV 94 02/11/2022     02/11/2022    ALT 19 02/11/2022    AST 18 02/11/2022    INR 1.1 06/15/2018    TRIG 77 02/11/2022      No results found.    REVIEW OF SYSTEMS  Review of Systems   Constitutional: Negative.    HENT: Negative.    Eyes: Negative.    Respiratory: Negative.    Cardiovascular: Negative.    Gastrointestinal: Positive for abdominal distention (occasional) and abdominal pain (occasional).   Endocrine: Negative.    Genitourinary: Negative.    Musculoskeletal: Negative.    Skin: Negative.    Allergic/Immunologic: Negative.    Neurological: Positive for dizziness.   Hematological: Negative.    Psychiatric/Behavioral: Negative.          ACTIVE PROBLEMS  Patient Active Problem List    Diagnosis    • COVID-19 virus detected [U07.1]    • Secondary hypertension [I15.9]    • Encounter for screening for malignant neoplasm of colon [Z12.11]    • Personal history of colonic polyps [Z86.010]    • Need for shingles vaccine [Z23]    • Chronic fatigue [R53.82]    • Thumb tendonitis [M77.8]    • Borderline hypertension [R03.0]    • Obesity (BMI 30.0-34.9) [E66.9]    • Menopause [Z78.0]    • Screening mammogram, encounter for [Z12.31]    • Allergic rhinitis due to allergen [J30.9]    • Flank pain [R10.9]    • Vitamin D insufficiency [E55.9]    • Acute foot pain, right [M79.671]    • Pre-operative clearance [Z01.818]    • Vaginal vault prolapse [N81.9]    • Decreased bladder capacity [N32.89]    • Genuine stress incontinence, female [N39.3]    • Urinary urgency [R39.15]    • Midline cystocele [N81.11]    • Mixed stress  and urge urinary incontinence [N39.46]    • Rectocele [N81.6]    • 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]    • Vaginal vault prolapse after hysterectomy [N99.3]    • Pap smear, as part of routine gynecological examination [Z01.419]    • Cystocele with rectocele [N81.10, N81.6]    • Hip pain [M25.559]    • Encounter for annual physical exam [Z00.00]    • Right hip pain [M25.551]    • Upper respiratory tract infection [J06.9]    • Cervical radiculopathy [M54.12]    • Contact dermatitis due to poison ivy [L23.7]    • Degeneration of intervertebral disc of cervical region [M50.30]    • Depression [F32.A]    • 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]    • Groin strain [S76.219A]    • Hematuria [R31.9]    • Mixed hyperlipidemia [E78.2]    • Microscopic hematuria [R31.29]    • Neck pain [M54.2]    • Pain in pelvis [R10.2]    • Shoulder pain [M25.519]    • Ulcerative colitis (HCC) [K51.90]    • Urinary tract infection without hematuria [N39.0]    • Vaginal prolapse [N81.10]    • Abnormal electrocardiogram [R94.31]    • Diverticulosis of large intestine [K57.30]    • Chronic thyroiditis [E06.5]          PAST MEDICAL HISTORY  Past Medical History:   Diagnosis Date   • Allergic    • Anxiety    • Colon polyp    • Headache    • Hx of ulcer disease    • Hyperlipidemia    • Hypertension    • Menstrual problem    • Thyroid disease          SURGICAL HISTORY  Past Surgical History:   Procedure Laterality Date   • BUNIONECTOMY     • COLONOSCOPY N/A 12/20/2021    Procedure: COLONOSCOPY WITH BIOPSIES;  Surgeon: Jonathan Devine MD;  Location: [facility];  Service: Gastroenterology;  Laterality: N/A;  DIVERTICULOSIS     • EYE PTOSIS REPAIR Bilateral 08/02/2021   • GALLBLADDER SURGERY     • HERNIA REPAIR     • HYSTERECTOMY           FAMILY HISTORY  Family History   Problem Relation Age of Onset   • Cancer Mother    • Heart failure Mother    • Hypertension Mother    • Heart attack Father 80   • Hypertension Sister    • Diabetes Sister    • Glaucoma  "Sister    • Lung disease Daughter    • Alcohol abuse Paternal Grandfather    • Breast cancer Neg Hx    • Malig Hyperthermia Neg Hx          SOCIAL HISTORY  Social History     Occupational History   • Not on file   Tobacco Use   • Smoking status: Never Smoker   • Smokeless tobacco: Never Used   Vaping Use   • Vaping Use: Never used   Substance and Sexual Activity   • Alcohol use: No   • Drug use: No   • Sexual activity: Defer         CURRENT MEDICATIONS    Current Outpatient Medications:   •  Cholecalciferol (VITAMIN D) 2000 units capsule, Take 2,000 Units by mouth Daily., Disp: , Rfl:   •  fluticasone (FLONASE) 50 MCG/ACT nasal spray, USE 2 SPRAY(S) IN EACH NOSTRIL ONCE DAILY AS DIRECTED, Disp: 16 g, Rfl: 6  •  hydrOXYzine (ATARAX) 25 MG tablet, Take 1 tablet by mouth Every 8 (Eight) Hours As Needed for Anxiety., Disp: 90 tablet, Rfl: 1  •  levocetirizine (XYZAL ALLERGY 24HR) 5 MG tablet, Take 1 tablet by mouth Every Evening., Disp: 30 tablet, Rfl: 12  •  mesalamine (LIALDA) 1.2 g EC tablet, Take 2 tablets by mouth Daily With Breakfast., Disp: 180 tablet, Rfl: 3  •  Probiotic Product (PROBIOTIC DAILY PO), Take 1 tablet by mouth Daily., Disp: , Rfl:   •  omeprazole (priLOSEC) 20 MG capsule, Take 1 capsule by mouth Daily., Disp: 90 capsule, Rfl: 3    ALLERGIES  Iodinated diagnostic agents, Other, and Sulfa antibiotics    VITALS  Vitals:    08/03/22 0903   BP: 108/62   BP Location: Left arm   Patient Position: Sitting   Cuff Size: Adult   Weight: 80.8 kg (178 lb 3.2 oz)   Height: 165.1 cm (65\")       PHYSICAL EXAM  Debilities/Disabilities Identified: None  Emotional Behavior: Appropriate  Wt Readings from Last 3 Encounters:   08/03/22 80.8 kg (178 lb 3.2 oz)   02/18/22 82 kg (180 lb 12.8 oz)   12/20/21 84.3 kg (185 lb 12.8 oz)     Ht Readings from Last 1 Encounters:   08/03/22 165.1 cm (65\")     Body mass index is 29.65 kg/m².  Physical Exam  Constitutional:       Appearance: She is well-developed. She is not " diaphoretic.   HENT:      Head: Normocephalic and atraumatic.   Eyes:      General: No scleral icterus.     Conjunctiva/sclera: Conjunctivae normal.      Pupils: Pupils are equal, round, and reactive to light.   Neck:      Thyroid: No thyromegaly.   Cardiovascular:      Rate and Rhythm: Normal rate and regular rhythm.      Heart sounds: Normal heart sounds. No murmur heard.    No gallop.   Pulmonary:      Effort: Pulmonary effort is normal.      Breath sounds: Normal breath sounds. No wheezing or rales.   Abdominal:      General: Bowel sounds are normal. There is no distension or abdominal bruit.      Palpations: Abdomen is soft. There is no shifting dullness, fluid wave or mass.      Tenderness: There is no abdominal tenderness. There is no guarding. Negative signs include Platt's sign.      Hernia: There is no hernia in the ventral area.   Musculoskeletal:         General: Normal range of motion.      Cervical back: Normal range of motion and neck supple.   Lymphadenopathy:      Cervical: No cervical adenopathy.   Skin:     General: Skin is warm and dry.      Findings: No erythema or rash.   Neurological:      Mental Status: She is alert and oriented to person, place, and time.   Psychiatric:         Mood and Affect: Mood normal.         Behavior: Behavior normal.         CLINICAL DATA REVIEWED   reviewed previous lab results and integrated with today's visit, reviewed notes from other physicians and/or last GI encounter, reviewed previous endoscopy results and available photos, reviewed surgical pathology results from previous biopsies    ASSESSMENT  Diagnoses and all orders for this visit:    Eructation  -     H. Pylori Antigen, Stool - Stool, Per Rectum  -     Comprehensive Metabolic Panel  -     Lipase    Ulcerative pancolitis without complication (HCC)  -     mesalamine (LIALDA) 1.2 g EC tablet; Take 2 tablets by mouth Daily With Breakfast.    Personal history of colonic polyps    Other orders  -      omeprazole (priLOSEC) 20 MG capsule; Take 1 capsule by mouth Daily.          PLAN  Return in about 6 months (around 2/3/2023).    I have discussed the above plan with the patient.  They verbalize understanding and are in agreement with the plan.  They have been advised to contact the office for any questions, concerns, or changes related to their health.

## 2022-08-05 LAB — H PYLORI AG STL QL IA: NEGATIVE

## 2023-01-06 ENCOUNTER — TELEPHONE (OUTPATIENT)
Dept: FAMILY MEDICINE CLINIC | Facility: CLINIC | Age: 63
End: 2023-01-06

## 2023-01-06 NOTE — TELEPHONE ENCOUNTER
Caller: Nesha Kee    Relationship: Self    Best call back number: [phone]    What orders are you requesting (i.e. lab or imaging): LABS    In what timeframe would the patient need to come in: 3/8/23    Where will you receive your lab/imaging services: IN OFFICE    Additional notes: PATIENT REQUESTS A CALL BACK TO SCHEDULE LABS ONCE ORDERS ARE IN PRIOR TO HER ANNUAL PHYSICAL. PATIENT ALSO REQUESTS TO MAKE SURE LABS ARE ORDERED AS PART OF HER PHYSICAL, AS SHE HAS HAD ISSUES WITH INSURANCE IN THE PAST

## 2023-01-27 ENCOUNTER — TRANSCRIBE ORDERS (OUTPATIENT)
Dept: ADMINISTRATIVE | Facility: HOSPITAL | Age: 63
End: 2023-01-27
Payer: COMMERCIAL

## 2023-01-27 DIAGNOSIS — Z92.89 HISTORY OF MAMMOGRAPHY, SCREENING: Primary | ICD-10-CM

## 2023-02-07 DIAGNOSIS — E55.9 VITAMIN D INSUFFICIENCY: ICD-10-CM

## 2023-02-07 DIAGNOSIS — I10 ESSENTIAL HYPERTENSION: ICD-10-CM

## 2023-02-07 DIAGNOSIS — E78.2 MIXED HYPERLIPIDEMIA: Chronic | ICD-10-CM

## 2023-02-07 DIAGNOSIS — Z00.00 ENCOUNTER FOR ANNUAL PHYSICAL EXAM: Primary | ICD-10-CM

## 2023-02-07 DIAGNOSIS — R03.0 BORDERLINE HYPERTENSION: ICD-10-CM

## 2023-02-18 LAB
25(OH)D3+25(OH)D2 SERPL-MCNC: 42.3 NG/ML (ref 30–100)
ALBUMIN SERPL-MCNC: 4.7 G/DL (ref 3.5–5.2)
ALBUMIN/GLOB SERPL: 2.4 G/DL
ALP SERPL-CCNC: 58 U/L (ref 39–117)
ALT SERPL-CCNC: 15 U/L (ref 1–33)
AST SERPL-CCNC: 16 U/L (ref 1–32)
BASOPHILS # BLD AUTO: 0.07 10*3/MM3 (ref 0–0.2)
BASOPHILS NFR BLD AUTO: 1.4 % (ref 0–1.5)
BILIRUB SERPL-MCNC: 0.4 MG/DL (ref 0–1.2)
BUN SERPL-MCNC: 17 MG/DL (ref 8–23)
BUN/CREAT SERPL: 19.5 (ref 7–25)
CALCIUM SERPL-MCNC: 10.2 MG/DL (ref 8.6–10.5)
CHLORIDE SERPL-SCNC: 105 MMOL/L (ref 98–107)
CHOLEST SERPL-MCNC: 214 MG/DL (ref 0–200)
CO2 SERPL-SCNC: 24.2 MMOL/L (ref 22–29)
CREAT SERPL-MCNC: 0.87 MG/DL (ref 0.57–1)
EGFRCR SERPLBLD CKD-EPI 2021: 75 ML/MIN/1.73
EOSINOPHIL # BLD AUTO: 0.12 10*3/MM3 (ref 0–0.4)
EOSINOPHIL NFR BLD AUTO: 2.4 % (ref 0.3–6.2)
ERYTHROCYTE [DISTWIDTH] IN BLOOD BY AUTOMATED COUNT: 12.1 % (ref 12.3–15.4)
GLOBULIN SER CALC-MCNC: 2 GM/DL
GLUCOSE SERPL-MCNC: 86 MG/DL (ref 65–99)
HCT VFR BLD AUTO: 39.9 % (ref 34–46.6)
HDLC SERPL-MCNC: 56 MG/DL (ref 40–60)
HGB BLD-MCNC: 13.6 G/DL (ref 12–15.9)
IMM GRANULOCYTES # BLD AUTO: 0.01 10*3/MM3 (ref 0–0.05)
IMM GRANULOCYTES NFR BLD AUTO: 0.2 % (ref 0–0.5)
LDLC SERPL CALC-MCNC: 129 MG/DL (ref 0–100)
LDLC/HDLC SERPL: 2.23 {RATIO}
LYMPHOCYTES # BLD AUTO: 1.03 10*3/MM3 (ref 0.7–3.1)
LYMPHOCYTES NFR BLD AUTO: 20.4 % (ref 19.6–45.3)
MCH RBC QN AUTO: 32.2 PG (ref 26.6–33)
MCHC RBC AUTO-ENTMCNC: 34.1 G/DL (ref 31.5–35.7)
MCV RBC AUTO: 94.3 FL (ref 79–97)
MONOCYTES # BLD AUTO: 0.34 10*3/MM3 (ref 0.1–0.9)
MONOCYTES NFR BLD AUTO: 6.7 % (ref 5–12)
NEUTROPHILS # BLD AUTO: 3.47 10*3/MM3 (ref 1.7–7)
NEUTROPHILS NFR BLD AUTO: 68.9 % (ref 42.7–76)
NRBC BLD AUTO-RTO: 0 /100 WBC (ref 0–0.2)
PLATELET # BLD AUTO: 272 10*3/MM3 (ref 140–450)
POTASSIUM SERPL-SCNC: 4.3 MMOL/L (ref 3.5–5.2)
PROT SERPL-MCNC: 6.7 G/DL (ref 6–8.5)
RBC # BLD AUTO: 4.23 10*6/MM3 (ref 3.77–5.28)
SODIUM SERPL-SCNC: 142 MMOL/L (ref 136–145)
TRIGL SERPL-MCNC: 165 MG/DL (ref 0–150)
TSH SERPL DL<=0.005 MIU/L-ACNC: 0.68 UIU/ML (ref 0.27–4.2)
VLDLC SERPL CALC-MCNC: 29 MG/DL (ref 5–40)
WBC # BLD AUTO: 5.04 10*3/MM3 (ref 3.4–10.8)

## 2023-02-28 ENCOUNTER — HOSPITAL ENCOUNTER (OUTPATIENT)
Dept: MAMMOGRAPHY | Facility: HOSPITAL | Age: 63
Discharge: HOME | End: 2023-02-28
Admitting: PHYSICIAN ASSISTANT
Payer: COMMERCIAL

## 2023-02-28 DIAGNOSIS — Z92.89 HISTORY OF MAMMOGRAPHY, SCREENING: ICD-10-CM

## 2023-02-28 PROCEDURE — 77067 SCR MAMMO BI INCL CAD: CPT

## 2023-02-28 PROCEDURE — 77063 BREAST TOMOSYNTHESIS BI: CPT

## 2023-03-07 ENCOUNTER — TELEPHONE (OUTPATIENT)
Dept: FAMILY MEDICINE CLINIC | Facility: CLINIC | Age: 63
End: 2023-03-07

## 2023-03-07 NOTE — TELEPHONE ENCOUNTER
Caller: Nesha Kee    Relationship to patient: Self    Best call back number: [phone]*    Type of visit: PHYSICAL    Requested date: 3/8/23 AT 8:15 IS STILL AVAILABLE, OR ANOTHER DATE SOON AT 8:15 AM.     If rescheduling, when is the original appointment: 4/27/23     Additional notes:PLEASE CALL.

## 2023-03-09 ENCOUNTER — TELEPHONE (OUTPATIENT)
Dept: FAMILY MEDICINE CLINIC | Facility: CLINIC | Age: 63
End: 2023-03-09
Payer: COMMERCIAL

## 2023-03-09 NOTE — TELEPHONE ENCOUNTER
Please notify patient of following labs.  Her appointment was canceled due to weather/no pallor.  Has an appointment in late April.  1.  CBC was normal.  2.  Cholesterol 214, triglycerides 165, HDL 56 and LDL was 129.  These have increased over the past year.  Please decrease carbohydrates and fatty food in diet.  Start over-the-counter CoQ10  once daily as well.  We will recheck fasting labs in 6 months.    3.  Fasting blood sugar was normal at 86.  4.  Vitamin D level was normal at 42.3.  5.  TSH was normal at 0.678.  Please keep appointment in April.

## 2023-04-24 PROBLEM — Z01.818 PRE-OPERATIVE CLEARANCE: Status: RESOLVED | Noted: 2018-05-24 | Resolved: 2023-04-24

## 2023-04-27 ENCOUNTER — OFFICE VISIT (OUTPATIENT)
Dept: FAMILY MEDICINE CLINIC | Facility: CLINIC | Age: 63
End: 2023-04-27
Payer: COMMERCIAL

## 2023-04-27 ENCOUNTER — HOSPITAL ENCOUNTER (OUTPATIENT)
Dept: GENERAL RADIOLOGY | Facility: HOSPITAL | Age: 63
Discharge: HOME | End: 2023-04-27
Payer: COMMERCIAL

## 2023-04-27 VITALS
RESPIRATION RATE: 15 BRPM | HEART RATE: 73 BPM | BODY MASS INDEX: 27.66 KG/M2 | SYSTOLIC BLOOD PRESSURE: 122 MMHG | DIASTOLIC BLOOD PRESSURE: 80 MMHG | TEMPERATURE: 97.7 F | HEIGHT: 65 IN | OXYGEN SATURATION: 98 % | WEIGHT: 166 LBS

## 2023-04-27 DIAGNOSIS — G89.29 CHRONIC RIGHT HIP PAIN: ICD-10-CM

## 2023-04-27 DIAGNOSIS — J30.89 NON-SEASONAL ALLERGIC RHINITIS DUE TO OTHER ALLERGIC TRIGGER: ICD-10-CM

## 2023-04-27 DIAGNOSIS — E78.2 MIXED HYPERLIPIDEMIA: Chronic | ICD-10-CM

## 2023-04-27 DIAGNOSIS — Z00.00 ENCOUNTER FOR ANNUAL PHYSICAL EXAM: Primary | ICD-10-CM

## 2023-04-27 DIAGNOSIS — M25.551 CHRONIC RIGHT HIP PAIN: ICD-10-CM

## 2023-04-27 PROCEDURE — 99396 PREV VISIT EST AGE 40-64: CPT | Performed by: PHYSICIAN ASSISTANT

## 2023-04-27 PROCEDURE — 73502 X-RAY EXAM HIP UNI 2-3 VIEWS: CPT

## 2023-04-27 RX ORDER — AZELASTINE HYDROCHLORIDE 137 UG/1
SPRAY, METERED NASAL
COMMUNITY
Start: 2023-01-24

## 2023-04-27 RX ORDER — FLUTICASONE PROPIONATE 50 MCG
2 SPRAY, SUSPENSION (ML) NASAL DAILY
Qty: 16 G | Refills: 6 | Status: SHIPPED | OUTPATIENT
Start: 2023-04-27

## 2023-04-27 NOTE — PROGRESS NOTES
"Chief Complaint  Annual Exam and Hyperlipidemia (management)    Subjective          Nesha Kee presents to [facility]  History of Present Illness  Nesha is a 62 year old female who presents annual physical examination with hyperlipidemia.  She has lost 12 pounds since last office visit and August 3, 2022.  States she has been trying to lose weight by eating healthier.  Sleep has been normal to slightly restless depending on hip pain.  Bowel movements have been normal without dark black tarry stools.  Nesha states she has been having right hip pain that sometimes radiates to her right lower back.  States the pain comes and goes but has becoming more noticeable.  Pain is located along the side of the right hip.  The pain is a dull ache which throbs at times.  Denied any recent injury, trauma, or falls.  Denied any radiation of the pain or tingling down the legs.  She has taken ibuprofen which has helped.  Nesha has also used topical arthritis cream with minimal relief.  States she is able to walk without difficulty.  Nesha denied any current fevers, chills, upper respiratory symptoms, chest pain, shortness of air, cough, wheezing, abdominal pain, GI upset, urinary symptoms or swelling of ankles.  Needs a refill on Flonase which she takes for allergies.  Her allergies are flaring up some but not too bad.     Objective   Vital Signs:   /80 (BP Location: Left arm, Patient Position: Sitting, Cuff Size: Adult)   Pulse 73   Temp 97.7 °F (36.5 °C)   Resp 15   Ht 165.1 cm (65\")   Wt 75.3 kg (166 lb)   SpO2 98%   BMI 27.62 kg/m²     Physical Exam  Vitals and nursing note reviewed.   Constitutional:       Appearance: Normal appearance. She is well-developed, well-groomed and overweight.   HENT:      Head: Normocephalic and atraumatic.      Jaw: There is normal jaw occlusion.      Right Ear: Hearing, tympanic membrane, ear canal and external ear normal.      Left Ear: Hearing, " tympanic membrane, ear canal and external ear normal.      Nose: Nose normal.      Right Sinus: No maxillary sinus tenderness or frontal sinus tenderness.      Left Sinus: No maxillary sinus tenderness or frontal sinus tenderness.      Mouth/Throat:      Lips: Pink.      Mouth: Mucous membranes are moist.      Dentition: Normal dentition.      Tongue: No lesions.      Pharynx: Oropharynx is clear. Uvula midline.      Tonsils: No tonsillar exudate.   Eyes:      General: Lids are normal.      Conjunctiva/sclera: Conjunctivae normal.      Pupils: Pupils are equal, round, and reactive to light.   Neck:      Thyroid: No thyroid mass, thyromegaly or thyroid tenderness.      Vascular: No carotid bruit.      Trachea: Trachea and phonation normal. No tracheal tenderness.   Cardiovascular:      Rate and Rhythm: Normal rate and regular rhythm.      Pulses: Normal pulses.      Heart sounds: Normal heart sounds, S1 normal and S2 normal. No murmur heard.  Pulmonary:      Effort: Pulmonary effort is normal.      Breath sounds: Normal breath sounds and air entry.   Abdominal:      General: Bowel sounds are normal.      Palpations: Abdomen is soft.      Tenderness: There is no abdominal tenderness. There is no right CVA tenderness, left CVA tenderness, guarding or rebound. Negative signs include Platt's sign, Rovsing's sign, McBurney's sign, psoas sign and obturator sign.   Musculoskeletal:      Cervical back: Neck supple.      Right hip: Tenderness and bony tenderness present. No deformity, lacerations or crepitus. Normal range of motion. Normal strength.      Left hip: Normal.      Right lower leg: No edema.      Left lower leg: No edema.        Legs:    Lymphadenopathy:      Cervical: No cervical adenopathy.      Right cervical: No superficial, deep or posterior cervical adenopathy.     Left cervical: No superficial, deep or posterior cervical adenopathy.   Skin:     General: Skin is warm and dry.      Capillary Refill:  Capillary refill takes less than 2 seconds.   Neurological:      Mental Status: She is alert and oriented to person, place, and time.      Deep Tendon Reflexes:      Reflex Scores:       Patellar reflexes are 2+ on the right side and 2+ on the left side.  Psychiatric:         Attention and Perception: Attention and perception normal.         Mood and Affect: Mood and affect normal.         Speech: Speech normal.         Behavior: Behavior is cooperative.         Thought Content: Thought content normal.         Cognition and Memory: Cognition and memory normal.         Judgment: Judgment normal.        Result Review :     CMP        8/3/2022    09:42 2/17/2023    08:27   CMP   Glucose 83   86     BUN 11   17     Creatinine 0.88   0.87     EGFR 74.4      Sodium 140   142     Potassium 4.1   4.3     Chloride 105   105     Calcium 9.5   10.2     Total Protein  6.7     Total Protein 6.7      Albumin 4.40   4.7     Globulin  2.0     Globulin 2.3      Total Bilirubin 0.7   0.4     Alkaline Phosphatase 54   58     AST (SGOT) 22   16     ALT (SGPT) 25   15     Albumin/Globulin Ratio 1.9      BUN/Creatinine Ratio 12.5   19.5     Anion Gap 8.6        CBC        2/17/2023    08:27   CBC   WBC 5.04     RBC 4.23     Hemoglobin 13.6     Hematocrit 39.9     MCV 94.3     MCH 32.2     MCHC 34.1     RDW 12.1     Platelets 272       Lipid Panel        2/17/2023    08:27   Lipid Panel   Total Cholesterol 214     Triglycerides 165     HDL Cholesterol 56     VLDL Cholesterol 29     LDL Cholesterol  129     LDL/HDL Ratio 2.23       TSH        2/17/2023    08:27   TSH   TSH 0.678                 Assessment and Plan    Diagnoses and all orders for this visit:    1. Encounter for annual physical exam (Primary)    2. Mixed hyperlipidemia    3. Non-seasonal allergic rhinitis due to other allergic trigger  -     fluticasone (FLONASE) 50 MCG/ACT nasal spray; 2 sprays by Each Nare route Daily.  Dispense: 16 g; Refill: 6    4. Chronic right hip  pain  -     XR hip w or wo pelvis 2-3 view right; Future    1.  Annual physical exam with hyperlipidemia: I have reviewed above blood work that was collected in February 2023 with her.  Triglycerides and cholesterol were slightly elevated.  I suspect this is diet related.  Stressed the importance of being healthier by decreasing fatty food and carbohydrates in it.  Plan to recheck fasting lab work in 1 year.  2.  Chronic and stable allergic rhinitis: I have refilled her Flonase prescription to pharmacy.  Continue current allergy medication at home.  3.  New on chronic right hip pain: We will proceed with right hip x-ray at [facility] today.  Nesha will be notified of test results when completed.    I spent 30 minutes caring for Nesha on this date of service. This time includes time spent by me in the following activities:preparing for the visit, reviewing tests, obtaining and/or reviewing a separately obtained history, performing a medically appropriate examination and/or evaluation , counseling and educating the patient/family/caregiver, ordering medications, tests, or procedures and documenting information in the medical record  Follow Up   Return in about 1 year (around 4/27/2024), or labs prior, for Annual.  Patient was given instructions and counseling regarding her condition or for health maintenance advice. Please see specific information pulled into the AVS if appropriate.     [facility]  [address] [place] [id number]  Dept: [phone]  Dept Fax: [phone]  Loc: [phone]  Loc Fax: [phone]

## 2023-07-07 PROBLEM — Z23 NEED FOR SHINGLES VACCINE: Status: RESOLVED | Noted: 2020-02-05 | Resolved: 2023-07-07

## 2023-08-02 ENCOUNTER — OFFICE VISIT (OUTPATIENT)
Dept: GASTROENTEROLOGY | Facility: CLINIC | Age: 63
End: 2023-08-02
Payer: COMMERCIAL

## 2023-08-02 VITALS
DIASTOLIC BLOOD PRESSURE: 82 MMHG | BODY MASS INDEX: 27.56 KG/M2 | WEIGHT: 165.4 LBS | HEIGHT: 65 IN | SYSTOLIC BLOOD PRESSURE: 116 MMHG

## 2023-08-02 DIAGNOSIS — Z86.010 PERSONAL HISTORY OF COLONIC POLYPS: ICD-10-CM

## 2023-08-02 DIAGNOSIS — K51.00 ULCERATIVE PANCOLITIS WITHOUT COMPLICATION: Primary | ICD-10-CM

## 2023-08-02 PROCEDURE — 99213 OFFICE O/P EST LOW 20 MIN: CPT | Performed by: INTERNAL MEDICINE

## 2023-08-02 RX ORDER — OMEPRAZOLE 40 MG/1
40 CAPSULE, DELAYED RELEASE ORAL DAILY
Qty: 90 CAPSULE | Refills: 3 | Status: SHIPPED | OUTPATIENT
Start: 2023-08-02

## 2023-08-02 RX ORDER — MESALAMINE 1.2 G/1
2.4 TABLET, DELAYED RELEASE ORAL
Qty: 180 TABLET | Refills: 3 | Status: SHIPPED | OUTPATIENT
Start: 2023-08-02

## 2023-08-02 RX ORDER — FLUCONAZOLE 200 MG/1
200 TABLET ORAL WEEKLY
COMMUNITY

## 2023-08-16 ENCOUNTER — HOSPITAL ENCOUNTER (OUTPATIENT)
Dept: BONE DENSITY | Facility: HOSPITAL | Age: 63
Discharge: HOME | End: 2023-08-16
Admitting: INTERNAL MEDICINE
Payer: COMMERCIAL

## 2023-08-16 DIAGNOSIS — K51.00 ULCERATIVE PANCOLITIS WITHOUT COMPLICATION: ICD-10-CM

## 2023-08-16 PROCEDURE — 77080 DXA BONE DENSITY AXIAL: CPT

## 2023-10-31 ENCOUNTER — TELEPHONE (OUTPATIENT)
Dept: GASTROENTEROLOGY | Facility: CLINIC | Age: 63
End: 2023-10-31
Payer: COMMERCIAL

## 2023-11-01 NOTE — TELEPHONE ENCOUNTER
Approvedon October 31  Your PA request has been approved. Additional information will be provided in the approval communication. (Message 1142)

## 2023-11-09 ENCOUNTER — OFFICE VISIT (OUTPATIENT)
Dept: FAMILY MEDICINE CLINIC | Facility: CLINIC | Age: 63
End: 2023-11-09
Payer: COMMERCIAL

## 2023-11-09 VITALS
WEIGHT: 175 LBS | TEMPERATURE: 98 F | BODY MASS INDEX: 29.16 KG/M2 | HEART RATE: 84 BPM | DIASTOLIC BLOOD PRESSURE: 84 MMHG | RESPIRATION RATE: 14 BRPM | OXYGEN SATURATION: 97 % | SYSTOLIC BLOOD PRESSURE: 124 MMHG | HEIGHT: 65 IN

## 2023-11-09 DIAGNOSIS — J01.00 ACUTE NON-RECURRENT MAXILLARY SINUSITIS: Primary | ICD-10-CM

## 2023-11-09 PROCEDURE — 99213 OFFICE O/P EST LOW 20 MIN: CPT | Performed by: PHYSICIAN ASSISTANT

## 2023-11-09 RX ORDER — METHYLPREDNISOLONE 4 MG/1
TABLET ORAL
Qty: 21 TABLET | Refills: 0 | Status: SHIPPED | OUTPATIENT
Start: 2023-11-09

## 2023-11-09 RX ORDER — AMOXICILLIN AND CLAVULANATE POTASSIUM 875; 125 MG/1; MG/1
1 TABLET, FILM COATED ORAL 2 TIMES DAILY
Qty: 20 TABLET | Refills: 0 | Status: SHIPPED | OUTPATIENT
Start: 2023-11-09

## 2023-11-09 NOTE — PROGRESS NOTES
"Chief Complaint  Cough, Sinusitis, and Headache    Subjective          Nesha Kee presents to [facility]  History of Present Illness  Nesha is a 63 year old female who presents with sinus pressure, nasal congestion, stuffy nose, postnasal drip and headache that has been occurring for the past 3 weeks.  Has used over-the-counter Sudafed which has helped slightly.  Denied any fevers, chills, sore throat, cough, wheezing, shortness of breath or GI upset.  Appetite and sleep have been slightly decreased.     Objective   Vital Signs:   /84 (BP Location: Left arm)   Pulse 84   Temp 98 °F (36.7 °C)   Resp 14   Ht 165.1 cm (65\")   Wt 79.4 kg (175 lb)   SpO2 97%   BMI 29.12 kg/m²     Physical Exam  Vitals and nursing note reviewed.   Constitutional:       Appearance: Normal appearance. She is well-developed, well-groomed and overweight.   HENT:      Head: Normocephalic and atraumatic.      Jaw: There is normal jaw occlusion.      Right Ear: Hearing, tympanic membrane, ear canal and external ear normal. Tympanic membrane is bulging.      Left Ear: Hearing, tympanic membrane, ear canal and external ear normal. Tympanic membrane is bulging.      Nose: Nose normal.      Right Sinus: Maxillary sinus tenderness present. No frontal sinus tenderness.      Left Sinus: Maxillary sinus tenderness present. No frontal sinus tenderness.      Mouth/Throat:      Lips: Pink.      Mouth: Mucous membranes are moist.      Dentition: Normal dentition.      Tongue: No lesions.      Pharynx: Oropharynx is clear. Uvula midline.      Tonsils: No tonsillar exudate.   Eyes:      General: Lids are normal.      Conjunctiva/sclera: Conjunctivae normal.      Pupils: Pupils are equal, round, and reactive to light.      Comments: Puffy eyes noted right worse than left   Neck:      Trachea: Trachea and phonation normal. No tracheal tenderness.   Cardiovascular:      Rate and Rhythm: Normal rate and regular " rhythm.      Pulses: Normal pulses.      Heart sounds: Normal heart sounds, S1 normal and S2 normal. No murmur heard.  Pulmonary:      Effort: Pulmonary effort is normal.      Breath sounds: Normal breath sounds and air entry.   Abdominal:      General: Bowel sounds are normal.      Palpations: Abdomen is soft.      Tenderness: There is no abdominal tenderness. There is no right CVA tenderness, left CVA tenderness, guarding or rebound. Negative signs include Platt's sign, Rovsing's sign, McBurney's sign, psoas sign and obturator sign.   Musculoskeletal:      Cervical back: Neck supple.      Right lower leg: No edema.      Left lower leg: No edema.   Lymphadenopathy:      Cervical: No cervical adenopathy.      Right cervical: No superficial, deep or posterior cervical adenopathy.     Left cervical: No superficial, deep or posterior cervical adenopathy.   Skin:     General: Skin is warm and dry.      Capillary Refill: Capillary refill takes less than 2 seconds.   Neurological:      Mental Status: She is alert and oriented to person, place, and time.   Psychiatric:         Attention and Perception: Attention and perception normal.         Mood and Affect: Mood and affect normal.         Speech: Speech normal.         Behavior: Behavior is cooperative.         Thought Content: Thought content normal.         Cognition and Memory: Cognition and memory normal.         Judgment: Judgment normal.        Result Review :                 Assessment and Plan    Diagnoses and all orders for this visit:    1. Acute non-recurrent maxillary sinusitis (Primary)  -     methylPREDNISolone (MEDROL) 4 MG dose pack; Take as directed on package instructions.  Dispense: 21 tablet; Refill: 0  -     amoxicillin-clavulanate (AUGMENTIN) 875-125 MG per tablet; Take 1 tablet by mouth 2 (Two) Times a Day.  Dispense: 20 tablet; Refill: 0    Nesha was seen in office today and diagnosed with acute maxillary sinusitis.  Have sent Augmentin and a  Medrol Dosepak to pharmacy.  She will use as directed.  May use over-the-counter Mucinex.  Return to office if symptoms do not improve.    I spent 15 minutes caring for Nesha on this date of service. This time includes time spent by me in the following activities:preparing for the visit, obtaining and/or reviewing a separately obtained history, performing a medically appropriate examination and/or evaluation , counseling and educating the patient/family/caregiver, ordering medications, tests, or procedures, and documenting information in the medical record  Follow Up   Return if symptoms worsen or fail to improve.  Patient was given instructions and counseling regarding her condition or for health maintenance advice. Please see specific information pulled into the AVS if appropriate.     [facility]  [address] [place] [id number]  Dept: [phone]  Dept Fax: [phone]  Loc: [phone]  Loc Fax: [phone]

## 2024-03-06 ENCOUNTER — HOSPITAL ENCOUNTER (OUTPATIENT)
Dept: GENERAL RADIOLOGY | Facility: HOSPITAL | Age: 64
Discharge: HOME | End: 2024-03-06
Admitting: PHYSICIAN ASSISTANT
Payer: COMMERCIAL

## 2024-03-06 ENCOUNTER — OFFICE VISIT (OUTPATIENT)
Dept: FAMILY MEDICINE CLINIC | Facility: CLINIC | Age: 64
End: 2024-03-06
Payer: COMMERCIAL

## 2024-03-06 VITALS
OXYGEN SATURATION: 98 % | DIASTOLIC BLOOD PRESSURE: 84 MMHG | HEART RATE: 74 BPM | TEMPERATURE: 98.2 F | SYSTOLIC BLOOD PRESSURE: 140 MMHG | BODY MASS INDEX: 29.66 KG/M2 | HEIGHT: 65 IN | RESPIRATION RATE: 15 BRPM | WEIGHT: 178 LBS

## 2024-03-06 DIAGNOSIS — M25.562 ACUTE PAIN OF LEFT KNEE: ICD-10-CM

## 2024-03-06 DIAGNOSIS — M25.562 ACUTE PAIN OF LEFT KNEE: Primary | ICD-10-CM

## 2024-03-06 DIAGNOSIS — M17.12 PRIMARY OSTEOARTHRITIS OF LEFT KNEE: ICD-10-CM

## 2024-03-06 PROCEDURE — 99214 OFFICE O/P EST MOD 30 MIN: CPT | Performed by: PHYSICIAN ASSISTANT

## 2024-03-06 PROCEDURE — 73562 X-RAY EXAM OF KNEE 3: CPT

## 2024-03-06 RX ORDER — MELOXICAM 15 MG/1
15 TABLET ORAL DAILY
Qty: 30 TABLET | Refills: 0 | Status: SHIPPED | OUTPATIENT
Start: 2024-03-06

## 2024-03-06 NOTE — PROGRESS NOTES
"Chief Complaint  Knee Pain (Left)    Subjective          Nesha Kee presents to [facility]  History of Present Illness  Nesha is a 63-year-old female who presents with left knee pain for the past 2 weeks.  States she has had a dull ache to throbbing pain in her knee with any type of movement.  The pain is located on lateral aspect of knee.  She has been taking ibuprofen with slight relief.  Has not used topical sports creams, ice or heat.  The knee does not give out on her.  Denied any recent injury or trauma to her knee.     Objective   Vital Signs:   /84 (BP Location: Left arm, Patient Position: Sitting, Cuff Size: Adult)   Pulse 74   Temp 98.2 °F (36.8 °C)   Resp 15   Ht 165.1 cm (65\")   Wt 80.7 kg (178 lb)   SpO2 98%   BMI 29.62 kg/m²     Physical Exam  Vitals and nursing note reviewed.   Constitutional:       Appearance: Normal appearance. She is well-developed, well-groomed and overweight.   HENT:      Head: Normocephalic and atraumatic.   Musculoskeletal:      Left knee: Swelling and bony tenderness present. No crepitus. Decreased range of motion. Tenderness present over the LCL. LCL laxity present. No MCL laxity, ACL laxity or PCL laxity.Normal alignment, normal meniscus and normal patellar mobility. Normal pulse.        Legs:    Skin:     General: Skin is warm and dry.      Capillary Refill: Capillary refill takes less than 2 seconds.   Neurological:      Mental Status: She is alert and oriented to person, place, and time.   Psychiatric:         Attention and Perception: Attention and perception normal.         Mood and Affect: Mood and affect normal.         Speech: Speech normal.         Behavior: Behavior normal. Behavior is cooperative.         Thought Content: Thought content normal.         Cognition and Memory: Cognition and memory normal.         Judgment: Judgment normal.        Result Review :                 Assessment and Plan    Diagnoses and all " orders for this visit:    1. Acute pain of left knee (Primary)  -     XR Knee 3 View Left; Future  -     meloxicam (Mobic) 15 MG tablet; Take 1 tablet by mouth Daily.  Dispense: 30 tablet; Refill: 0    Nesha was seen in office today with new acute left knee pain.  She will have a left knee x-ray at [facility] today for further evaluation.  Have sent to pharmacy meloxicam to take once daily.  May apply heat or ice to the area to see if this helps.  Will consider physical therapy and or orthopedist in future if warranted.  She will be notified of imaging results.    I spent 16 minutes caring for Nesha on this date of service. This time includes time spent by me in the following activities:preparing for the visit, obtaining and/or reviewing a separately obtained history, performing a medically appropriate examination and/or evaluation , counseling and educating the patient/family/caregiver, ordering medications, tests, or procedures, and documenting information in the medical record  Follow Up   Return if symptoms worsen or fail to improve.  Patient was given instructions and counseling regarding her condition or for health maintenance advice. Please see specific information pulled into the AVS if appropriate.     [facility]  [address] [facility] [id number]  Dept: [phone]  Dept Fax: [phone]  Loc: [phone]  Loc Fax: [phone]

## 2024-03-15 ENCOUNTER — OFFICE VISIT (OUTPATIENT)
Dept: ORTHOPEDIC SURGERY | Facility: CLINIC | Age: 64
End: 2024-03-15
Payer: COMMERCIAL

## 2024-03-15 VITALS
HEART RATE: 66 BPM | SYSTOLIC BLOOD PRESSURE: 157 MMHG | BODY MASS INDEX: 29.66 KG/M2 | DIASTOLIC BLOOD PRESSURE: 94 MMHG | HEIGHT: 65 IN | WEIGHT: 178 LBS

## 2024-03-15 DIAGNOSIS — M17.12 PRIMARY OSTEOARTHRITIS OF LEFT KNEE: Primary | ICD-10-CM

## 2024-03-15 RX ORDER — LIDOCAINE HYDROCHLORIDE 10 MG/ML
8 INJECTION, SOLUTION EPIDURAL; INFILTRATION; INTRACAUDAL; PERINEURAL
Status: COMPLETED | OUTPATIENT
Start: 2024-03-15 | End: 2024-03-15

## 2024-03-15 RX ORDER — TRIAMCINOLONE ACETONIDE 40 MG/ML
80 INJECTION, SUSPENSION INTRA-ARTICULAR; INTRAMUSCULAR
Status: COMPLETED | OUTPATIENT
Start: 2024-03-15 | End: 2024-03-15

## 2024-03-15 RX ADMIN — LIDOCAINE HYDROCHLORIDE 8 ML: 10 INJECTION, SOLUTION EPIDURAL; INFILTRATION; INTRACAUDAL; PERINEURAL at 15:02

## 2024-03-15 RX ADMIN — TRIAMCINOLONE ACETONIDE 80 MG: 40 INJECTION, SUSPENSION INTRA-ARTICULAR; INTRAMUSCULAR at 15:02

## 2024-03-15 NOTE — PROGRESS NOTES
Subjective:     Patient ID: Nesha Kee is a 63 y.o. female.    Chief Complaint:  Left knee pain - new patient to examiner   History of Present Illness  Nesha Kee Presents to clinic today for evaluation of her left knee.  She is experienced pain on and off for quite some time but more recently increased pain of the left knee for the last 3 or so weeks.  She did step to the side began experiencing increased pain along the medial compartment as well as the anterior aspect of the left knee.  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  She is more sedentary position.  Rates discomfort a 6-7 out of a 10 crushing in nature with grinding sensation across the anterior aspect of the knee.  She is experiencing swelling clicking and popping as well.  She is increasing pain with increased walking especially fast walking, ascending and descending stairs and with standing activities.  Is currently taking meloxicam which new medication to her as prescribed by her primary care.  X-ray images have been completed which are available for review in chart.  Denies any prior injections denies any prior surgical intervention.  Denies any other concerns present.    Social History     Occupational History    Not on file   Tobacco Use    Smoking status: Never     Passive exposure: Past    Smokeless tobacco: Never   Vaping Use    Vaping status: Never Used   Substance and Sexual Activity    Alcohol use: No    Drug use: No    Sexual activity: Defer      Past Medical History:   Diagnosis Date    Allergic     Anxiety     Colon polyp     Headache     Hx of ulcer disease     Hyperlipidemia     Hypertension     Menstrual problem     Thyroid disease      Past Surgical History:   Procedure Laterality Date    BUNIONECTOMY  "     COLONOSCOPY N/A 12/20/2021    Procedure: COLONOSCOPY WITH BIOPSIES;  Surgeon: Jonathan Devine MD;  Location: [facility] OR;  Service: Gastroenterology;  Laterality: N/A;  DIVERTICULOSIS      EYE PTOSIS REPAIR Bilateral 08/02/2021    GALLBLADDER SURGERY      HERNIA REPAIR      HYSTERECTOMY         Family History   Problem Relation Age of Onset    Cancer Mother     Heart failure Mother     Hypertension Mother     Heart attack Father 80    Hypertension Sister     Diabetes Sister     Glaucoma Sister     Lung disease Daughter     Alcohol abuse Paternal Grandfather     Breast cancer Neg Hx     Malig Hyperthermia Neg Hx                Objective:  Physical Exam    Vital signs reviewed.   General: No acute distress.  Eyes: conjunctiva clear; pupils equally round and reactive  ENT: external ears and nose atraumatic; oropharynx clear  CV: no peripheral edema  Resp: normal respiratory effort  Skin: no rashes or wounds; normal turgor  Psych: mood and affect appropriate; recent and remote memory intact    Vitals:    03/15/24 1440   BP: 157/94   Pulse: 66   Weight: 80.7 kg (178 lb)   Height: 165.1 cm (65\")         03/15/24  1440   Weight: 80.7 kg (178 lb)     Body mass index is 29.62 kg/m².      Left Knee Exam     Comments:  Left knee examined  Knee range of motion 0 to 125 degrees  Stable varus valgus stress at 0 degrees and 30 degrees  Positive active patellar compression test  Positive crepitus throughout arc of motion  Mildly positive tenderness along the medial compartment  Moderate to severe swelling, negative effusion  Positive sensation light touch all distributions left lower extremity  Negative medial and lateral Anjelica's exam  1+ anterior Lachman  Trace positive anterior drawer, negative posterior drawer exam                 Imaging:  XR Knee 3 View Left    Result Date: 3/6/2024  1.No evidence for displaced fracture or dislocation. 2.Tricompartmental arthropathy suggesting moderate osteoarthritis. " Electronically Signed: Sunil Carter MD  3/6/2024 9:46 AM EST  Workstation ID: [id number]    Independently reviewed 3 view x-ray images nonweightbearing left knee completed available for review in chart medial compartment narrowing, mild to moderate with reactive osteophyte medial femoral condyle, moderate patellofemoral narrowing with reactive osteophyte inferior and superior patellar pole  Assessment:        1. Primary osteoarthritis of left knee           Plan:  Large Joint Arthrocentesis: L knee  Date/Time: 3/15/2024 3:02 PM  Consent given by: patient  Site marked: site marked  Timeout: Immediately prior to procedure a time out was called to verify the correct patient, procedure, equipment, support staff and site/side marked as required   Supporting Documentation  Indications: pain   Procedure Details  Location: knee - L knee  Preparation: Patient was prepped and draped in the usual sterile fashion  Needle size: 22 G  Approach: anterolateral  Medications administered: 80 mg triamcinolone acetonide 40 MG/ML; 8 mL lidocaine PF 1% 1 %  Patient tolerance: patient tolerated the procedure well with no immediate complications        1.  Discussed plan of care with patient.  We did discuss treatment options including proceeding with corticosteroid injection left knee versus oral steroid taper.  At this time she wishes to proceed with corticosteroid injection left knee.  I do recommend application of ice injection site this evening may take 3 to 7 days before she begins to experience any significant symptom improvement.  I do recommend quad strengthening hamstring strengthening calf strengthening exercises that she can begin after the 3-day.  I will plan to see her back in clinic in 6 weeks to reevaluate.  She may discontinue the meloxicam after the steroid injection begins to work.  Encouraged to call with any questions or concerns or gladly see her back in clinic sooner if needed.  All questions  answered.  Orders:  Orders Placed This Encounter   Procedures    Large Joint Arthrocentesis: L knee     No orders of the defined types were placed in this encounter.          I ordered and reviewed the VICTOR M today.     Body mass index is 29.62 kg/m².    Dragon dictation utilized

## 2024-03-25 ENCOUNTER — TELEPHONE (OUTPATIENT)
Dept: GASTROENTEROLOGY | Facility: CLINIC | Age: 64
End: 2024-03-25

## 2024-03-25 NOTE — TELEPHONE ENCOUNTER
Caller: Nesha Kee    Relationship to patient: Self    Best call back number: [phone]    Patient is needing: PATIENT IS EXPERIENCING RECTAL BLEEDING. SHE IS NOT SURE IF THIS IS A HEMORRHOID OR IF ANOTHER ISSUE IS CAUSING THIS. I HAVE SCHEDULED HER FOR NEXT AVAILABLE WITH JUDIE MONTALVO ON 05/06/24, AND PLACED HER ON THE WAIT LIST BUT THE RECTAL BLEEDING IS CONCERNING. PLEASE REVIEW AND CONTACT PATIENT TO ADVISE.

## 2024-04-02 ENCOUNTER — OFFICE VISIT (OUTPATIENT)
Dept: GASTROENTEROLOGY | Facility: CLINIC | Age: 64
End: 2024-04-02
Payer: COMMERCIAL

## 2024-04-02 ENCOUNTER — LAB (OUTPATIENT)
Dept: LAB | Facility: HOSPITAL | Age: 64
End: 2024-04-02
Payer: COMMERCIAL

## 2024-04-02 ENCOUNTER — TELEPHONE (OUTPATIENT)
Dept: GASTROENTEROLOGY | Facility: CLINIC | Age: 64
End: 2024-04-02

## 2024-04-02 VITALS
WEIGHT: 177.8 LBS | BODY MASS INDEX: 29.62 KG/M2 | SYSTOLIC BLOOD PRESSURE: 116 MMHG | DIASTOLIC BLOOD PRESSURE: 74 MMHG | HEIGHT: 65 IN

## 2024-04-02 DIAGNOSIS — K62.5 RECTAL BLEEDING: ICD-10-CM

## 2024-04-02 DIAGNOSIS — K51.90 ULCERATIVE COLITIS WITHOUT COMPLICATIONS, UNSPECIFIED LOCATION: ICD-10-CM

## 2024-04-02 DIAGNOSIS — K51.90 ULCERATIVE COLITIS WITHOUT COMPLICATIONS, UNSPECIFIED LOCATION: Primary | ICD-10-CM

## 2024-04-02 LAB
BASOPHILS # BLD AUTO: 0.06 10*3/MM3 (ref 0–0.2)
BASOPHILS NFR BLD AUTO: 0.8 % (ref 0–1.5)
CRP SERPL-MCNC: 0.3 MG/DL (ref 0.01–0.5)
DEPRECATED RDW RBC AUTO: 42.2 FL (ref 37–54)
EOSINOPHIL # BLD AUTO: 0.09 10*3/MM3 (ref 0–0.4)
EOSINOPHIL NFR BLD AUTO: 1.1 % (ref 0.3–6.2)
ERYTHROCYTE [DISTWIDTH] IN BLOOD BY AUTOMATED COUNT: 12.1 % (ref 12.3–15.4)
HCT VFR BLD AUTO: 43.2 % (ref 34–46.6)
HGB BLD-MCNC: 14.2 G/DL (ref 12–15.9)
IMM GRANULOCYTES # BLD AUTO: 0.02 10*3/MM3 (ref 0–0.05)
IMM GRANULOCYTES NFR BLD AUTO: 0.3 % (ref 0–0.5)
LYMPHOCYTES # BLD AUTO: 1.68 10*3/MM3 (ref 0.7–3.1)
LYMPHOCYTES NFR BLD AUTO: 21.1 % (ref 19.6–45.3)
MCH RBC QN AUTO: 31.1 PG (ref 26.6–33)
MCHC RBC AUTO-ENTMCNC: 32.9 G/DL (ref 31.5–35.7)
MCV RBC AUTO: 94.5 FL (ref 79–97)
MONOCYTES # BLD AUTO: 0.53 10*3/MM3 (ref 0.1–0.9)
MONOCYTES NFR BLD AUTO: 6.7 % (ref 5–12)
NEUTROPHILS NFR BLD AUTO: 5.58 10*3/MM3 (ref 1.7–7)
NEUTROPHILS NFR BLD AUTO: 70 % (ref 42.7–76)
NRBC BLD AUTO-RTO: 0 /100 WBC (ref 0–0.2)
PLATELET # BLD AUTO: 313 10*3/MM3 (ref 140–450)
PMV BLD AUTO: 9.3 FL (ref 6–12)
RBC # BLD AUTO: 4.57 10*6/MM3 (ref 3.77–5.28)
WBC NRBC COR # BLD AUTO: 7.96 10*3/MM3 (ref 3.4–10.8)

## 2024-04-02 PROCEDURE — 85025 COMPLETE CBC W/AUTO DIFF WBC: CPT

## 2024-04-02 PROCEDURE — 36415 COLL VENOUS BLD VENIPUNCTURE: CPT

## 2024-04-02 PROCEDURE — 86141 C-REACTIVE PROTEIN HS: CPT

## 2024-04-02 RX ORDER — HYDROCORTISONE ACETATE 25 MG/1
25 SUPPOSITORY RECTAL 2 TIMES DAILY PRN
Qty: 30 SUPPOSITORY | Refills: 0 | Status: SHIPPED | OUTPATIENT
Start: 2024-04-02

## 2024-04-02 NOTE — PROGRESS NOTES
PATIENT INFORMATION  Nesha Kee       - 1960    CHIEF COMPLAINT  Chief Complaint   Patient presents with    Ulcerative Colitis    Rectal Bleeding       HISTORY OF PRESENT ILLNESS  Here today for early UC follow-up    UC managed on 2 lialda daily.    Rectal bleeding for a few weeks, mostly on toilet paper, can be streaked or a clot. No rectal pain or abdominal cramping. Urgency after eating. 3 solid stools a day. Normal was 1-2. Was on meloxicam for a few weeks. Is on fiber pill.    2021 Last Colon 0 adenomas, no active colitis. Next due 2024.    Ulcerative Colitis  Pertinent negatives include no abdominal pain, nausea or vomiting.   Rectal Bleeding  Pertinent negatives include no abdominal pain, nausea or vomiting.       REVIEWED PERTINENT RESULTS/ LABS  Lab Results   Component Value Date    CASEREPORT  2021     Surgical Pathology Report                         Case: [id number]                                  Authorizing Provider:  Jonathan Devine        Collected:           2021 10:13 AM                                 MD Shaila                                                                   Ordering Location:     [facility]   Received:            2021 10:33 AM                                 OR                                                                           Pathologist:           Trino Amaya MD                                                         Specimens:   1) - Large Intestine, Right / Ascending Colon, BX FOR UC                                            2) - Large Intestine, Sigmoid Colon, BX FOR UC                                                      3) - Large Intestine, Transverse Colon, BX FOR UC                                                   4) - Large Intestine, Rectum, BX FOR UC                                                    FINALDX  2021     1. Right Ascending Colon Biopsy: Colonic mucosa with    A.  Minimal hyperplastic change with normal crypt architecture.   B. No active inflammation nor granulomatous inflammation identified.   C. No glandular dysplasia nor malignancy identified.    2. Sigmoid Colon Biopsy: Colonic mucosa with    A. Normal crypt architecture.   B. No active inflammation nor granulomatous inflammation identified.    3. Transverse Colon Biopsy:   A. Colonic mucosa with developing lymphoid aggregate.  B. No active inflammation nor granulomatous inflammation identified.    4. Rectum Biopsy: Colonic mucosa with   A. Relatively normal crypt architecture with mild hyperplastic change noted.  B. No active inflammation nor granulomatous inflammation identified.  C. No glandular dysplasia nor malignancy identified.    jat/olgae        Lab Results   Component Value Date    HGB 13.6 02/17/2023    MCV 94.3 02/17/2023     02/17/2023    ALT 15 02/17/2023    AST 16 02/17/2023    INR 1.1 06/15/2018    TRIG 165 (H) 02/17/2023      XR Knee 3 View Left    Result Date: 3/6/2024  Narrative: XR KNEE 3 VW LEFT Date of Exam: 3/6/2024 9:15 AM EST Indication: acute left knee pain Comparison: None available. FINDINGS: There is no evidence for displaced fracture or dislocation. No significant joint effusion is observed. Tricompartmental changes of arthropathy are noted. There is joint space narrowing and subchondral sclerosis. Osteophytosis is also noted. These findings are moderate. No definitive intra-articular or periarticular erosion is seen. The soft tissues are unremarkable.     Impression: 1.No evidence for displaced fracture or dislocation. 2.Tricompartmental arthropathy suggesting moderate osteoarthritis. Electronically Signed: Sunil Carter MD  3/6/2024 9:46 AM EST  Workstation ID: [id number]     REVIEW OF SYSTEMS  Review of Systems   Constitutional: Negative.    HENT: Negative.     Eyes: Negative.    Respiratory: Negative.     Cardiovascular: Negative.    Gastrointestinal:  Positive for anal bleeding and  hematochezia. Negative for abdominal pain, constipation, diarrhea, nausea and vomiting.        UC   Endocrine: Negative.    Genitourinary: Negative.    Musculoskeletal: Negative.    Skin: Negative.    Allergic/Immunologic: Negative.    Neurological: Negative.    Hematological:  Bruises/bleeds easily.   Psychiatric/Behavioral: Negative.           ACTIVE PROBLEMS  Patient Active Problem List    Diagnosis     Primary osteoarthritis of left knee [M17.12]     COVID-19 virus detected [U07.1]     Secondary hypertension [I15.9]     Encounter for screening for malignant neoplasm of colon [Z12.11]     Personal history of colonic polyps [Z86.010]     Chronic fatigue [R53.82]     Thumb tendonitis [M77.8]     Borderline hypertension [R03.0]     Obesity (BMI 30.0-34.9) [E66.9]     Menopause [Z78.0]     Screening mammogram, encounter for [Z12.31]     Allergic rhinitis due to allergen [J30.9]     Flank pain [R10.9]     Vitamin D insufficiency [E55.9]     Acute foot pain, right [M79.671]     Vaginal vault prolapse [N81.9]     Decreased bladder capacity [N32.89]     Genuine stress incontinence, female [N39.3]     Urinary urgency [R39.15]     Midline cystocele [N81.11]     Mixed stress and urge urinary incontinence [N39.46]     Rectocele [N81.6]     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]     Vaginal vault prolapse after hysterectomy [N99.3]     Pap smear, as part of routine gynecological examination [Z01.419]     Cystocele with rectocele [N81.10, N81.6]     Hip pain [M25.559]     Encounter for annual physical exam [Z00.00]     Right hip pain [M25.551]     Upper respiratory tract infection [J06.9]     Cervical radiculopathy [M54.12]     Contact dermatitis due to poison ivy [L23.7]     Degeneration of intervertebral disc of cervical region [M50.30]     Depression [F32.A]     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]     Groin strain [S76.219A]     Hematuria [R31.9]     Mixed hyperlipidemia [E78.2]     Microscopic hematuria [R31.29]     Neck pain [M54.2]      Pain in pelvis [R10.2]     Shoulder pain [M25.519]     Ulcerative colitis [K51.90]     Urinary tract infection without hematuria [N39.0]     Vaginal prolapse [N81.10]     Abnormal electrocardiogram [R94.31]     Diverticulosis of large intestine [K57.30]     Chronic thyroiditis [E06.5]          PAST MEDICAL HISTORY  Past Medical History:   Diagnosis Date    Allergic     Anxiety     Colon polyp     Headache     Hx of ulcer disease     Hyperlipidemia     Hypertension     Menstrual problem     Thyroid disease     Ulcerative colitis 1975         SURGICAL HISTORY  Past Surgical History:   Procedure Laterality Date    BUNIONECTOMY      CHOLECYSTECTOMY  12/10    COLONOSCOPY N/A 12/20/2021    Procedure: COLONOSCOPY WITH BIOPSIES;  Surgeon: Jonathan Devine MD;  Location: [facility] OR;  Service: Gastroenterology;  Laterality: N/A;  DIVERTICULOSIS      EYE PTOSIS REPAIR Bilateral 08/02/2021    GALLBLADDER SURGERY      HERNIA REPAIR      HYSTERECTOMY           FAMILY HISTORY  Family History   Problem Relation Age of Onset    Cancer Mother     Heart failure Mother     Hypertension Mother     Heart attack Father 80    Hypertension Sister     Diabetes Sister     Glaucoma Sister     Lung disease Daughter     Alcohol abuse Paternal Grandfather     Breast cancer Neg Hx     Malig Hyperthermia Neg Hx          SOCIAL HISTORY  Social History     Occupational History    Not on file   Tobacco Use    Smoking status: Never     Passive exposure: Past    Smokeless tobacco: Never   Vaping Use    Vaping status: Never Used   Substance and Sexual Activity    Alcohol use: No    Drug use: No    Sexual activity: Defer         CURRENT MEDICATIONS    Current Outpatient Medications:     Cholecalciferol (VITAMIN D) 2000 units capsule, Take 1 capsule by mouth Daily., Disp: , Rfl:     fluticasone (FLONASE) 50 MCG/ACT nasal spray, 2 sprays by Each Nare route Daily., Disp: 16 g, Rfl: 6    Inulin (FIBER CHOICE PO), Take  by mouth Daily., Disp: ,  "Rfl:     levocetirizine (XYZAL ALLERGY 24HR) 5 MG tablet, Take 1 tablet by mouth Every Evening., Disp: 30 tablet, Rfl: 12    mesalamine (LIALDA) 1.2 g EC tablet, Take 2 tablets by mouth Daily With Breakfast., Disp: 180 tablet, Rfl: 3    omeprazole (priLOSEC) 40 MG capsule, Take 1 capsule by mouth Daily., Disp: 90 capsule, Rfl: 3    Probiotic Product (PROBIOTIC DAILY PO), Take 1 tablet by mouth Daily., Disp: , Rfl:     hydrocortisone (ANUSOL-HC) 25 MG suppository, Insert 1 suppository into the rectum 2 (Two) Times a Day As Needed (Rectal bleeding and urgency)., Disp: 30 suppository, Rfl: 0    hydrOXYzine (ATARAX) 25 MG tablet, Take 1 tablet by mouth 3 (Three) Times a Day As Needed for Anxiety. (Patient not taking: Reported on 4/2/2024), Disp: 30 tablet, Rfl: 2    ALLERGIES  Iodinated contrast media, Other, and Sulfa antibiotics    VITALS  Vitals:    04/02/24 1305   BP: 116/74   BP Location: Left arm   Patient Position: Sitting   Cuff Size: Large Adult   Weight: 80.6 kg (177 lb 12.8 oz)   Height: 165.1 cm (65\")       PHYSICAL EXAM  Debilities/Disabilities Identified: None  Emotional Behavior: Appropriate  Wt Readings from Last 3 Encounters:   04/02/24 80.6 kg (177 lb 12.8 oz)   03/15/24 80.7 kg (178 lb)   03/06/24 80.7 kg (178 lb)     Ht Readings from Last 1 Encounters:   04/02/24 165.1 cm (65\")     Body mass index is 29.59 kg/m².  Physical Exam  Constitutional:       General: She is not in acute distress.     Appearance: Normal appearance. She is not ill-appearing.   HENT:      Head: Normocephalic and atraumatic.      Mouth/Throat:      Mouth: Mucous membranes are moist.      Pharynx: No posterior oropharyngeal erythema.   Eyes:      General: No scleral icterus.  Cardiovascular:      Rate and Rhythm: Normal rate and regular rhythm.      Heart sounds: Normal heart sounds.   Pulmonary:      Effort: Pulmonary effort is normal.      Breath sounds: Normal breath sounds.   Abdominal:      General: Abdomen is flat. Bowel " sounds are normal. There is no distension.      Palpations: Abdomen is soft. There is no mass.      Tenderness: There is no abdominal tenderness in the epigastric area. There is no guarding or rebound. Negative signs include Platt's sign.      Hernia: No hernia is present.   Musculoskeletal:      Cervical back: Neck supple.   Skin:     General: Skin is warm.      Capillary Refill: Capillary refill takes less than 2 seconds.   Neurological:      General: No focal deficit present.      Mental Status: She is alert and oriented to person, place, and time.   Psychiatric:         Mood and Affect: Mood normal.         Behavior: Behavior normal.         Thought Content: Thought content normal.         Judgment: Judgment normal.         CLINICAL DATA REVIEWED   reviewed previous lab results and integrated with today's visit, reviewed notes from other physicians and/or last GI encounter, reviewed previous endoscopy results and available photos, reviewed surgical pathology results from previous biopsies    ASSESSMENT  Diagnoses and all orders for this visit:    Ulcerative colitis without complications, unspecified location  -     Calprotectin, Fecal - Stool, Per Rectum  -     hydrocortisone (ANUSOL-HC) 25 MG suppository; Insert 1 suppository into the rectum 2 (Two) Times a Day As Needed (Rectal bleeding and urgency).  -     CBC & Differential  -     High Sensitivity CRP; Future    Rectal bleeding  -     Calprotectin, Fecal - Stool, Per Rectum  -     hydrocortisone (ANUSOL-HC) 25 MG suppository; Insert 1 suppository into the rectum 2 (Two) Times a Day As Needed (Rectal bleeding and urgency).  -     CBC & Differential  -     High Sensitivity CRP; Future          PLAN    Increase daily fiber supplement (powder)  HC supp  Fecal mahesh, CRP, CBC. If question of UC control would opt to increase lialda and schedule colon    No follow-ups on file.    I have discussed the above plan with the patient.  They verbalize understanding and are  in agreement with the plan.  They have been advised to contact the office for any questions, concerns, or changes related to their health.

## 2024-04-02 NOTE — H&P (VIEW-ONLY)
PATIENT INFORMATION  Nesha Kee       - 1960    CHIEF COMPLAINT  Chief Complaint   Patient presents with    Ulcerative Colitis    Rectal Bleeding       HISTORY OF PRESENT ILLNESS  Here today for early UC follow-up    UC managed on 2 lialda daily.    Rectal bleeding for a few weeks, mostly on toilet paper, can be streaked or a clot. No rectal pain or abdominal cramping. Urgency after eating. 3 solid stools a day. Normal was 1-2. Was on meloxicam for a few weeks. Is on fiber pill.    2021 Last Colon 0 adenomas, no active colitis. Next due 2024.    Ulcerative Colitis  Pertinent negatives include no abdominal pain, nausea or vomiting.   Rectal Bleeding  Pertinent negatives include no abdominal pain, nausea or vomiting.       REVIEWED PERTINENT RESULTS/ LABS  Lab Results   Component Value Date    CASEREPORT  2021     Surgical Pathology Report                         Case: [id number]                                  Authorizing Provider:  Jonathan Devine        Collected:           2021 10:13 AM                                 MD Shaila                                                                   Ordering Location:     [facility]   Received:            2021 10:33 AM                                 OR                                                                           Pathologist:           Trino Amaya MD                                                         Specimens:   1) - Large Intestine, Right / Ascending Colon, BX FOR UC                                            2) - Large Intestine, Sigmoid Colon, BX FOR UC                                                      3) - Large Intestine, Transverse Colon, BX FOR UC                                                   4) - Large Intestine, Rectum, BX FOR UC                                                    FINALDX  2021     1. Right Ascending Colon Biopsy: Colonic mucosa with    A.  Minimal hyperplastic change with normal crypt architecture.   B. No active inflammation nor granulomatous inflammation identified.   C. No glandular dysplasia nor malignancy identified.    2. Sigmoid Colon Biopsy: Colonic mucosa with    A. Normal crypt architecture.   B. No active inflammation nor granulomatous inflammation identified.    3. Transverse Colon Biopsy:   A. Colonic mucosa with developing lymphoid aggregate.  B. No active inflammation nor granulomatous inflammation identified.    4. Rectum Biopsy: Colonic mucosa with   A. Relatively normal crypt architecture with mild hyperplastic change noted.  B. No active inflammation nor granulomatous inflammation identified.  C. No glandular dysplasia nor malignancy identified.    jat/olgae        Lab Results   Component Value Date    HGB 13.6 02/17/2023    MCV 94.3 02/17/2023     02/17/2023    ALT 15 02/17/2023    AST 16 02/17/2023    INR 1.1 06/15/2018    TRIG 165 (H) 02/17/2023      XR Knee 3 View Left    Result Date: 3/6/2024  Narrative: XR KNEE 3 VW LEFT Date of Exam: 3/6/2024 9:15 AM EST Indication: acute left knee pain Comparison: None available. FINDINGS: There is no evidence for displaced fracture or dislocation. No significant joint effusion is observed. Tricompartmental changes of arthropathy are noted. There is joint space narrowing and subchondral sclerosis. Osteophytosis is also noted. These findings are moderate. No definitive intra-articular or periarticular erosion is seen. The soft tissues are unremarkable.     Impression: 1.No evidence for displaced fracture or dislocation. 2.Tricompartmental arthropathy suggesting moderate osteoarthritis. Electronically Signed: Sunil Carter MD  3/6/2024 9:46 AM EST  Workstation ID: [id number]     REVIEW OF SYSTEMS  Review of Systems   Constitutional: Negative.    HENT: Negative.     Eyes: Negative.    Respiratory: Negative.     Cardiovascular: Negative.    Gastrointestinal:  Positive for anal bleeding and  hematochezia. Negative for abdominal pain, constipation, diarrhea, nausea and vomiting.        UC   Endocrine: Negative.    Genitourinary: Negative.    Musculoskeletal: Negative.    Skin: Negative.    Allergic/Immunologic: Negative.    Neurological: Negative.    Hematological:  Bruises/bleeds easily.   Psychiatric/Behavioral: Negative.           ACTIVE PROBLEMS  Patient Active Problem List    Diagnosis     Primary osteoarthritis of left knee [M17.12]     COVID-19 virus detected [U07.1]     Secondary hypertension [I15.9]     Encounter for screening for malignant neoplasm of colon [Z12.11]     Personal history of colonic polyps [Z86.010]     Chronic fatigue [R53.82]     Thumb tendonitis [M77.8]     Borderline hypertension [R03.0]     Obesity (BMI 30.0-34.9) [E66.9]     Menopause [Z78.0]     Screening mammogram, encounter for [Z12.31]     Allergic rhinitis due to allergen [J30.9]     Flank pain [R10.9]     Vitamin D insufficiency [E55.9]     Acute foot pain, right [M79.671]     Vaginal vault prolapse [N81.9]     Decreased bladder capacity [N32.89]     Genuine stress incontinence, female [N39.3]     Urinary urgency [R39.15]     Midline cystocele [N81.11]     Mixed stress and urge urinary incontinence [N39.46]     Rectocele [N81.6]     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]     Vaginal vault prolapse after hysterectomy [N99.3]     Pap smear, as part of routine gynecological examination [Z01.419]     Cystocele with rectocele [N81.10, N81.6]     Hip pain [M25.559]     Encounter for annual physical exam [Z00.00]     Right hip pain [M25.551]     Upper respiratory tract infection [J06.9]     Cervical radiculopathy [M54.12]     Contact dermatitis due to poison ivy [L23.7]     Degeneration of intervertebral disc of cervical region [M50.30]     Depression [F32.A]     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]     Groin strain [S76.219A]     Hematuria [R31.9]     Mixed hyperlipidemia [E78.2]     Microscopic hematuria [R31.29]     Neck pain [M54.2]      Pain in pelvis [R10.2]     Shoulder pain [M25.519]     Ulcerative colitis [K51.90]     Urinary tract infection without hematuria [N39.0]     Vaginal prolapse [N81.10]     Abnormal electrocardiogram [R94.31]     Diverticulosis of large intestine [K57.30]     Chronic thyroiditis [E06.5]          PAST MEDICAL HISTORY  Past Medical History:   Diagnosis Date    Allergic     Anxiety     Colon polyp     Headache     Hx of ulcer disease     Hyperlipidemia     Hypertension     Menstrual problem     Thyroid disease     Ulcerative colitis 1975         SURGICAL HISTORY  Past Surgical History:   Procedure Laterality Date    BUNIONECTOMY      CHOLECYSTECTOMY  12/10    COLONOSCOPY N/A 12/20/2021    Procedure: COLONOSCOPY WITH BIOPSIES;  Surgeon: Jonathan Devine MD;  Location: [facility] OR;  Service: Gastroenterology;  Laterality: N/A;  DIVERTICULOSIS      EYE PTOSIS REPAIR Bilateral 08/02/2021    GALLBLADDER SURGERY      HERNIA REPAIR      HYSTERECTOMY           FAMILY HISTORY  Family History   Problem Relation Age of Onset    Cancer Mother     Heart failure Mother     Hypertension Mother     Heart attack Father 80    Hypertension Sister     Diabetes Sister     Glaucoma Sister     Lung disease Daughter     Alcohol abuse Paternal Grandfather     Breast cancer Neg Hx     Malig Hyperthermia Neg Hx          SOCIAL HISTORY  Social History     Occupational History    Not on file   Tobacco Use    Smoking status: Never     Passive exposure: Past    Smokeless tobacco: Never   Vaping Use    Vaping status: Never Used   Substance and Sexual Activity    Alcohol use: No    Drug use: No    Sexual activity: Defer         CURRENT MEDICATIONS    Current Outpatient Medications:     Cholecalciferol (VITAMIN D) 2000 units capsule, Take 1 capsule by mouth Daily., Disp: , Rfl:     fluticasone (FLONASE) 50 MCG/ACT nasal spray, 2 sprays by Each Nare route Daily., Disp: 16 g, Rfl: 6    Inulin (FIBER CHOICE PO), Take  by mouth Daily., Disp: ,  "Rfl:     levocetirizine (XYZAL ALLERGY 24HR) 5 MG tablet, Take 1 tablet by mouth Every Evening., Disp: 30 tablet, Rfl: 12    mesalamine (LIALDA) 1.2 g EC tablet, Take 2 tablets by mouth Daily With Breakfast., Disp: 180 tablet, Rfl: 3    omeprazole (priLOSEC) 40 MG capsule, Take 1 capsule by mouth Daily., Disp: 90 capsule, Rfl: 3    Probiotic Product (PROBIOTIC DAILY PO), Take 1 tablet by mouth Daily., Disp: , Rfl:     hydrocortisone (ANUSOL-HC) 25 MG suppository, Insert 1 suppository into the rectum 2 (Two) Times a Day As Needed (Rectal bleeding and urgency)., Disp: 30 suppository, Rfl: 0    hydrOXYzine (ATARAX) 25 MG tablet, Take 1 tablet by mouth 3 (Three) Times a Day As Needed for Anxiety. (Patient not taking: Reported on 4/2/2024), Disp: 30 tablet, Rfl: 2    ALLERGIES  Iodinated contrast media, Other, and Sulfa antibiotics    VITALS  Vitals:    04/02/24 1305   BP: 116/74   BP Location: Left arm   Patient Position: Sitting   Cuff Size: Large Adult   Weight: 80.6 kg (177 lb 12.8 oz)   Height: 165.1 cm (65\")       PHYSICAL EXAM  Debilities/Disabilities Identified: None  Emotional Behavior: Appropriate  Wt Readings from Last 3 Encounters:   04/02/24 80.6 kg (177 lb 12.8 oz)   03/15/24 80.7 kg (178 lb)   03/06/24 80.7 kg (178 lb)     Ht Readings from Last 1 Encounters:   04/02/24 165.1 cm (65\")     Body mass index is 29.59 kg/m².  Physical Exam  Constitutional:       General: She is not in acute distress.     Appearance: Normal appearance. She is not ill-appearing.   HENT:      Head: Normocephalic and atraumatic.      Mouth/Throat:      Mouth: Mucous membranes are moist.      Pharynx: No posterior oropharyngeal erythema.   Eyes:      General: No scleral icterus.  Cardiovascular:      Rate and Rhythm: Normal rate and regular rhythm.      Heart sounds: Normal heart sounds.   Pulmonary:      Effort: Pulmonary effort is normal.      Breath sounds: Normal breath sounds.   Abdominal:      General: Abdomen is flat. Bowel " sounds are normal. There is no distension.      Palpations: Abdomen is soft. There is no mass.      Tenderness: There is no abdominal tenderness in the epigastric area. There is no guarding or rebound. Negative signs include Platt's sign.      Hernia: No hernia is present.   Musculoskeletal:      Cervical back: Neck supple.   Skin:     General: Skin is warm.      Capillary Refill: Capillary refill takes less than 2 seconds.   Neurological:      General: No focal deficit present.      Mental Status: She is alert and oriented to person, place, and time.   Psychiatric:         Mood and Affect: Mood normal.         Behavior: Behavior normal.         Thought Content: Thought content normal.         Judgment: Judgment normal.         CLINICAL DATA REVIEWED   reviewed previous lab results and integrated with today's visit, reviewed notes from other physicians and/or last GI encounter, reviewed previous endoscopy results and available photos, reviewed surgical pathology results from previous biopsies    ASSESSMENT  Diagnoses and all orders for this visit:    Ulcerative colitis without complications, unspecified location  -     Calprotectin, Fecal - Stool, Per Rectum  -     hydrocortisone (ANUSOL-HC) 25 MG suppository; Insert 1 suppository into the rectum 2 (Two) Times a Day As Needed (Rectal bleeding and urgency).  -     CBC & Differential  -     High Sensitivity CRP; Future    Rectal bleeding  -     Calprotectin, Fecal - Stool, Per Rectum  -     hydrocortisone (ANUSOL-HC) 25 MG suppository; Insert 1 suppository into the rectum 2 (Two) Times a Day As Needed (Rectal bleeding and urgency).  -     CBC & Differential  -     High Sensitivity CRP; Future          PLAN    Increase daily fiber supplement (powder)  HC supp  Fecal mahesh, CRP, CBC. If question of UC control would opt to increase lialda and schedule colon    No follow-ups on file.    I have discussed the above plan with the patient.  They verbalize understanding and are  in agreement with the plan.  They have been advised to contact the office for any questions, concerns, or changes related to their health.

## 2024-04-02 NOTE — TELEPHONE ENCOUNTER
PA sent through [facility] for hydrocortisone suppository. May need to use [facility].

## 2024-04-03 ENCOUNTER — LAB (OUTPATIENT)
Dept: LAB | Facility: HOSPITAL | Age: 64
End: 2024-04-03
Payer: COMMERCIAL

## 2024-04-03 PROCEDURE — 83993 ASSAY FOR CALPROTECTIN FECAL: CPT

## 2024-04-03 NOTE — TELEPHONE ENCOUNTER
PA was denied. Scanned into media.   Advised patient that she can get rx with [facility] at [facility] for $30. Patient stated she would like to wait and see what the stool sample shows before she purchases medication.

## 2024-04-07 LAB — CALPROTECTIN STL-MCNT: 410 UG/G (ref 0–120)

## 2024-04-08 DIAGNOSIS — K51.90 ULCERATIVE COLITIS WITHOUT COMPLICATIONS, UNSPECIFIED LOCATION: Primary | ICD-10-CM

## 2024-04-09 ENCOUNTER — TELEPHONE (OUTPATIENT)
Dept: GASTROENTEROLOGY | Facility: CLINIC | Age: 64
End: 2024-04-09
Payer: COMMERCIAL

## 2024-04-09 NOTE — TELEPHONE ENCOUNTER
Scheduled colonoscopy per lab results dated 04/03/2024.    Spoke with patient.  Scheduled at [place] 04/22/2024 arrive at 10:30am.  Email prep instructions today.    [email or web address]

## 2024-04-15 ENCOUNTER — TRANSCRIBE ORDERS (OUTPATIENT)
Dept: ADMINISTRATIVE | Facility: HOSPITAL | Age: 64
End: 2024-04-15
Payer: COMMERCIAL

## 2024-04-15 DIAGNOSIS — E55.9 VITAMIN D INSUFFICIENCY: ICD-10-CM

## 2024-04-15 DIAGNOSIS — E78.2 MIXED HYPERLIPIDEMIA: Chronic | ICD-10-CM

## 2024-04-15 DIAGNOSIS — Z00.00 ROUTINE HEALTH MAINTENANCE: Primary | ICD-10-CM

## 2024-04-15 DIAGNOSIS — Z12.31 BREAST CANCER SCREENING BY MAMMOGRAM: Primary | ICD-10-CM

## 2024-04-18 ENCOUNTER — ANESTHESIA EVENT (OUTPATIENT)
Dept: PERIOP | Facility: HOSPITAL | Age: 64
End: 2024-04-18
Payer: COMMERCIAL

## 2024-04-22 ENCOUNTER — HOSPITAL ENCOUNTER (OUTPATIENT)
Facility: HOSPITAL | Age: 64
Setting detail: HOSPITAL OUTPATIENT SURGERY
Discharge: HOME | End: 2024-04-22
Attending: INTERNAL MEDICINE | Admitting: INTERNAL MEDICINE
Payer: COMMERCIAL

## 2024-04-22 ENCOUNTER — ANESTHESIA (OUTPATIENT)
Dept: PERIOP | Facility: HOSPITAL | Age: 64
End: 2024-04-22
Payer: COMMERCIAL

## 2024-04-22 VITALS
RESPIRATION RATE: 11 BRPM | WEIGHT: 175 LBS | SYSTOLIC BLOOD PRESSURE: 160 MMHG | TEMPERATURE: 97.7 F | BODY MASS INDEX: 29.12 KG/M2 | OXYGEN SATURATION: 99 % | HEART RATE: 51 BPM | DIASTOLIC BLOOD PRESSURE: 92 MMHG

## 2024-04-22 DIAGNOSIS — K51.90 ULCERATIVE COLITIS WITHOUT COMPLICATIONS, UNSPECIFIED LOCATION: ICD-10-CM

## 2024-04-22 PROCEDURE — 45380 COLONOSCOPY AND BIOPSY: CPT | Performed by: INTERNAL MEDICINE

## 2024-04-22 PROCEDURE — 25010000002 PROPOFOL 200 MG/20ML EMULSION: Performed by: NURSE ANESTHETIST, CERTIFIED REGISTERED

## 2024-04-22 PROCEDURE — 88305 TISSUE EXAM BY PATHOLOGIST: CPT | Performed by: INTERNAL MEDICINE

## 2024-04-22 PROCEDURE — 25810000003 LACTATED RINGERS PER 1000 ML: Performed by: NURSE ANESTHETIST, CERTIFIED REGISTERED

## 2024-04-22 RX ORDER — LIDOCAINE HYDROCHLORIDE 10 MG/ML
0.5 INJECTION, SOLUTION INFILTRATION; PERINEURAL ONCE AS NEEDED
Status: DISCONTINUED | OUTPATIENT
Start: 2024-04-22 | End: 2024-04-22 | Stop reason: HOSPADM

## 2024-04-22 RX ORDER — SODIUM CHLORIDE, SODIUM LACTATE, POTASSIUM CHLORIDE, CALCIUM CHLORIDE 600; 310; 30; 20 MG/100ML; MG/100ML; MG/100ML; MG/100ML
9 INJECTION, SOLUTION INTRAVENOUS CONTINUOUS
Status: DISCONTINUED | OUTPATIENT
Start: 2024-04-22 | End: 2024-04-22 | Stop reason: HOSPADM

## 2024-04-22 RX ORDER — SODIUM CHLORIDE 9 MG/ML
40 INJECTION, SOLUTION INTRAVENOUS AS NEEDED
Status: DISCONTINUED | OUTPATIENT
Start: 2024-04-22 | End: 2024-04-22 | Stop reason: HOSPADM

## 2024-04-22 RX ORDER — SODIUM CHLORIDE 0.9 % (FLUSH) 0.9 %
10 SYRINGE (ML) INJECTION EVERY 12 HOURS SCHEDULED
Status: DISCONTINUED | OUTPATIENT
Start: 2024-04-22 | End: 2024-04-22 | Stop reason: HOSPADM

## 2024-04-22 RX ORDER — ONDANSETRON 2 MG/ML
4 INJECTION INTRAMUSCULAR; INTRAVENOUS ONCE AS NEEDED
Status: DISCONTINUED | OUTPATIENT
Start: 2024-04-22 | End: 2024-04-22 | Stop reason: HOSPADM

## 2024-04-22 RX ORDER — SODIUM CHLORIDE, SODIUM LACTATE, POTASSIUM CHLORIDE, CALCIUM CHLORIDE 600; 310; 30; 20 MG/100ML; MG/100ML; MG/100ML; MG/100ML
100 INJECTION, SOLUTION INTRAVENOUS ONCE
Status: DISCONTINUED | OUTPATIENT
Start: 2024-04-22 | End: 2024-04-22 | Stop reason: HOSPADM

## 2024-04-22 RX ORDER — SODIUM CHLORIDE 0.9 % (FLUSH) 0.9 %
10 SYRINGE (ML) INJECTION AS NEEDED
Status: DISCONTINUED | OUTPATIENT
Start: 2024-04-22 | End: 2024-04-22 | Stop reason: HOSPADM

## 2024-04-22 RX ORDER — PROPOFOL 10 MG/ML
INJECTION, EMULSION INTRAVENOUS AS NEEDED
Status: DISCONTINUED | OUTPATIENT
Start: 2024-04-22 | End: 2024-04-22 | Stop reason: SURG

## 2024-04-22 RX ORDER — LIDOCAINE HYDROCHLORIDE 20 MG/ML
INJECTION, SOLUTION INFILTRATION; PERINEURAL AS NEEDED
Status: DISCONTINUED | OUTPATIENT
Start: 2024-04-22 | End: 2024-04-22 | Stop reason: SURG

## 2024-04-22 RX ADMIN — SODIUM CHLORIDE, POTASSIUM CHLORIDE, SODIUM LACTATE AND CALCIUM CHLORIDE 9 ML/HR: 600; 310; 30; 20 INJECTION, SOLUTION INTRAVENOUS at 11:06

## 2024-04-22 RX ADMIN — PROPOFOL 50 MG: 10 INJECTION, EMULSION INTRAVENOUS at 12:05

## 2024-04-22 RX ADMIN — PROPOFOL 100 MG: 10 INJECTION, EMULSION INTRAVENOUS at 11:46

## 2024-04-22 RX ADMIN — LIDOCAINE HYDROCHLORIDE 80 MG: 20 INJECTION, SOLUTION INFILTRATION; PERINEURAL at 11:43

## 2024-04-22 RX ADMIN — PROPOFOL 50 MG: 10 INJECTION, EMULSION INTRAVENOUS at 11:51

## 2024-04-22 RX ADMIN — PROPOFOL 50 MG: 10 INJECTION, EMULSION INTRAVENOUS at 11:55

## 2024-04-22 RX ADMIN — PROPOFOL 50 MG: 10 INJECTION, EMULSION INTRAVENOUS at 12:08

## 2024-04-22 RX ADMIN — PROPOFOL 50 MG: 10 INJECTION, EMULSION INTRAVENOUS at 12:00

## 2024-04-22 NOTE — ANESTHESIA PREPROCEDURE EVALUATION
Anesthesia Evaluation     Patient summary reviewed and Nursing notes reviewed   no history of anesthetic complications:   NPO Solid Status: > 8 hours  NPO Liquid Status: > 8 hours           Airway   Mallampati: II  TM distance: >3 FB  Neck ROM: full  No difficulty expected  Dental - normal exam     Pulmonary - negative pulmonary ROS and normal exam   (-) recent URI  Cardiovascular - normal exam  Exercise tolerance: good (4-7 METS)    ECG reviewed    (-) hypertension, hyperlipidemia    ROS comment: Stress Test 2021:  · Left ventricular ejection fraction is hyperdynamic (Calculated EF > 70%). .  · Myocardial perfusion imaging indicates a normal myocardial perfusion study with no evidence of ischemia.  · Impressions are consistent with a low risk study.  · Findings consistent with an equivocal ECG stress test.      Neuro/Psych  (+) headaches, psychiatric history Anxiety  GI/Hepatic/Renal/Endo    (+) GERD well controlled, thyroid problem     Musculoskeletal     Abdominal  - normal exam   Substance History - negative use     OB/GYN          Other   arthritis,                       Anesthesia Plan    ASA 2     MAC     intravenous induction     Anesthetic plan, risks, benefits, and alternatives have been provided, discussed and informed consent has been obtained with: patient.    Use of blood products discussed with patient  Consented to blood products.    Plan discussed with CRNA.        CODE STATUS:

## 2024-04-22 NOTE — INTERVAL H&P NOTE
Vital signs  /88 (BP Location: Left arm, Patient Position: Lying)   Pulse 73   Temp 98.3 °F (36.8 °C) (Oral)   Resp 10   Wt 79.4 kg (175 lb)   SpO2 96%   BMI 29.12 kg/m²     H&P reviewed. The patient was examined and there are no changes to the H&P.

## 2024-04-22 NOTE — ANESTHESIA POSTPROCEDURE EVALUATION
Patient: Nesha Kee    Procedure Summary       Date: 04/22/24 Room / Location: [facility] ENDOSCOPY 1 /  [facility] OR    Anesthesia Start: 1139 Anesthesia Stop: 1212    Procedure: COLONOSCOPY WITH BIOPSY Diagnosis:       Ulcerative colitis without complications, unspecified location      Colitis      Diverticulosis      (Ulcerative colitis without complications, unspecified location [K51.90])    Surgeons: Jonathan Devine MD Provider: Selma Díaz CRNA    Anesthesia Type: MAC ASA Status: 2            Anesthesia Type: MAC    Vitals  Vitals Value Taken Time   /93 04/22/24 1242   Temp 97.7 °F (36.5 °C) 04/22/24 1215   Pulse 49 04/22/24 1248   Resp 11 04/22/24 1215   SpO2 99 % 04/22/24 1248   Vitals shown include unfiled device data.        Post Anesthesia Care and Evaluation    Patient location during evaluation: bedside  Patient participation: complete - patient participated  Level of consciousness: awake and alert  Pain score: 0  Pain management: adequate    Airway patency: patent  Anesthetic complications: No anesthetic complications  PONV Status: none  Cardiovascular status: acceptable  Respiratory status: acceptable  Hydration status: acceptable  No anesthesia care post op

## 2024-04-22 NOTE — BRIEF OP NOTE
COLONOSCOPY WITH BIOPSY  Progress Note    Nesha Kee  4/22/2024    Pre-op Diagnosis:   Ulcerative colitis without complications, unspecified location [K51.90]       Post-Op Diagnosis Codes:     * Ulcerative colitis without complications, unspecified location [K51.90]     * Colitis [K52.9]     * Diverticulosis [K57.90]    Procedure/CPT® Codes:        Procedure(s):  COLONOSCOPY WITH BIOPSY              Surgeon(s):  Jonathan Devine MD    Anesthesia: Monitored Anesthesia Care    Staff:   Circulator: Leigh Koehler RN  Scrub Person: Imani Varela         Estimated Blood Loss: none    Urine Voided: * No values recorded between 4/22/2024 11:39 AM and 4/22/2024 12:09 PM *    Specimens:                Specimens       ID Source Type Tests Collected By Collected At Frozen?    A Large Intestine, Right / Ascending Colon Tissue TISSUE PATHOLOGY EXAM   Jonathan Devine MD 4/22/24 1157     Description: Right colon bx    B Large Intestine, Transverse Colon Tissue TISSUE PATHOLOGY EXAM   Jonathan Devine MD 4/22/24 1159     C Large Intestine, Sigmoid Colon Tissue TISSUE PATHOLOGY EXAM   Jonathan Devine MD 4/22/24 1202     D Large Intestine, Rectum Tissue TISSUE PATHOLOGY EXAM   Jonathan Devine MD 4/22/24 1206                   Drains: * No LDAs found *    Findings: Colon to TI good prep  Pan-Colonic Diverticulosis  Colitis 15-25 cm  Biopsy-Right, Trans, Sigmoid , Rectal          Complications: none          Jonathan Devine MD     Date: 4/22/2024  Time: 12:11 EDT

## 2024-04-24 LAB
LAB AP CASE REPORT: NORMAL
PATH REPORT.FINAL DX SPEC: NORMAL
PATH REPORT.GROSS SPEC: NORMAL

## 2024-04-25 RX ORDER — BUDESONIDE 9 MG/1
9 TABLET, FILM COATED, EXTENDED RELEASE ORAL DAILY
Qty: 60 TABLET | Refills: 0 | Status: SHIPPED | OUTPATIENT
Start: 2024-04-25 | End: 2024-04-26

## 2024-04-26 ENCOUNTER — HOSPITAL ENCOUNTER (OUTPATIENT)
Dept: MAMMOGRAPHY | Facility: HOSPITAL | Age: 64
Discharge: HOME | End: 2024-04-26
Admitting: PHYSICIAN ASSISTANT
Payer: COMMERCIAL

## 2024-04-26 DIAGNOSIS — Z12.31 BREAST CANCER SCREENING BY MAMMOGRAM: ICD-10-CM

## 2024-04-26 DIAGNOSIS — K51.00 ULCERATIVE PANCOLITIS WITHOUT COMPLICATION: ICD-10-CM

## 2024-04-26 PROCEDURE — 77067 SCR MAMMO BI INCL CAD: CPT

## 2024-04-26 PROCEDURE — 77063 BREAST TOMOSYNTHESIS BI: CPT

## 2024-04-26 RX ORDER — BUDESONIDE 3 MG/1
CAPSULE, COATED PELLETS ORAL
Qty: 154 CAPSULE | Refills: 0 | Status: SHIPPED | OUTPATIENT
Start: 2024-04-26 | End: 2024-07-25

## 2024-04-26 RX ORDER — MESALAMINE 1.2 G/1
4.8 TABLET, DELAYED RELEASE ORAL
Qty: 360 TABLET | Refills: 3 | Status: SHIPPED | OUTPATIENT
Start: 2024-04-26

## 2024-04-29 ENCOUNTER — OFFICE VISIT (OUTPATIENT)
Dept: ORTHOPEDIC SURGERY | Facility: CLINIC | Age: 64
End: 2024-04-29
Payer: COMMERCIAL

## 2024-04-29 VITALS — WEIGHT: 175 LBS | HEIGHT: 65 IN | BODY MASS INDEX: 29.16 KG/M2

## 2024-04-29 DIAGNOSIS — M17.12 PRIMARY OSTEOARTHRITIS OF LEFT KNEE: Primary | ICD-10-CM

## 2024-04-29 PROCEDURE — 99213 OFFICE O/P EST LOW 20 MIN: CPT | Performed by: NURSE PRACTITIONER

## 2024-04-29 NOTE — PROGRESS NOTES
Subjective:     Patient ID: Nesha Kee is a 63 y.o. female.    Chief Complaint:  Follow-up left knee primary osteoarthritis  Corticosteroid injection left knee 3/15/2024  History of Present Illness  Nesha Kee    The patient returns to clinic today for follow-up of her left knee.    During her previous visit, the patient received a corticosteroid injection, which significantly alleviated her symptoms. Concurrently, she was initiated on oral anti-inflammatory medication, including meloxicam. Since that time, she has been under the care of a gastroenterologist due to her history of ulcerative colitis. A scope was conducted, and it was hypothesized that the NSAID may have exacerbated her symptoms, leading to the discontinuation of the medication. Currently, she is not experiencing pain with transitional activities and has been able to resume activities as tolerated without significant tenderness at the right knee. Following her scope, she reported that the pain across the anterior aspect of her pelvis subsided for approximately 3 days before returning. She denies any other concerns at present.     Social History     Occupational History    Not on file   Tobacco Use    Smoking status: Never     Passive exposure: Past    Smokeless tobacco: Never   Vaping Use    Vaping status: Never Used   Substance and Sexual Activity    Alcohol use: No    Drug use: No    Sexual activity: Yes     Partners: Male     Birth control/protection: Hysterectomy      Past Medical History:   Diagnosis Date    Allergic     Anxiety     Arthritis of neck     Colon polyp     Headache     Hip arthrosis     Hx of ulcer disease     Hyperlipidemia     Hypertension     Knee swelling     Menstrual problem     Thyroid disease     Ulcerative colitis 1975     Past Surgical History:   Procedure Laterality Date    BACK SURGERY      BUNIONECTOMY      CHOLECYSTECTOMY  12/10    COLONOSCOPY N/A 12/20/2021    Procedure: COLONOSCOPY WITH BIOPSIES;  Surgeon:  "Jonathan Devine MD;  Location: [facility] OR;  Service: Gastroenterology;  Laterality: N/A;  DIVERTICULOSIS      COLONOSCOPY N/A 04/22/2024    Procedure: COLONOSCOPY WITH BIOPSY;  Surgeon: Jonathan Devine MD;  Location:  [facility] OR;  Service: Gastroenterology;  Laterality: N/A;  Diverticulosis; Ulcerative colitis; Right colon bx; Transverse colon bx; Sigmoid bx; Rectal bx    CYSTOCELE REPAIR      EYE PTOSIS REPAIR Bilateral 08/02/2021    HERNIA REPAIR      HYSTERECTOMY      LUMBAR DISCECTOMY  1993       Family History   Problem Relation Age of Onset    Cancer Mother     Heart failure Mother     Hypertension Mother     Heart attack Father 80    Hypertension Sister     Diabetes Sister     Glaucoma Sister     Lung disease Daughter     Alcohol abuse Paternal Grandfather     Breast cancer Neg Hx     Malig Hyperthermia Neg Hx                Objective:  Physical Exam  General: No acute distress.  Eyes: conjunctiva clear; pupils equally round and reactive  ENT: external ears and nose atraumatic; oropharynx clear  CV: no peripheral edema  Resp: normal respiratory effort  Skin: no rashes or wounds; normal turgor  Psych: mood and affect appropriate; recent and remote memory intact    Vitals:    04/29/24 1524   Weight: 79.4 kg (175 lb)   Height: 165.1 cm (65\")         04/29/24  1524   Weight: 79.4 kg (175 lb)     Body mass index is 29.12 kg/m².      Left Knee Exam     Tenderness   The patient is experiencing no tenderness.     Range of Motion   Extension:  0   Left knee flexion: 125.     Tests   Anjelica:  Medial - negative Lateral - negative  Varus: negative Valgus: negative  Lachman:  Anterior - 1+      Drawer:  Anterior - negative     Posterior - negative  Patellar apprehension: positive    Other   Erythema: absent  Sensation: normal  Pulse: present  Swelling: moderate  Effusion: no effusion present    Comments:  Positive active patellar compression test               Assessment:        1. Primary " osteoarthritis of left knee           Plan:  1. Pain in the left knee.  - Upon evaluation, it is my clinical judgement that the patient's hip pain is primarily soft tissue-related, particularly at the anterior aspect of her hip. The patient and I engaged in a comprehensive discussion regarding the plan of care. We explored potential treatment options, including a corticosteroid injection to be administered at the 3-month harmeet as required. We also reviewed the radiograph images completed in 04/2024. We also recommended the incorporation of quad stretches into her regimen. The patient was encouraged to reach out with any questions or concerns.     All her questions were addressed. Should her hip symptoms persist, we may contemplate conducting a repeat MRI.    Orders:  No orders of the defined types were placed in this encounter.    No orders of the defined types were placed in this encounter.      Dragon dictation utilized          Transcribed from ambient dictation for JUDIE Keller by Aida Hardwick.  04/29/24   17:09 EDT    Patient or patient representative verbalized consent to the visit recording.  I have personally performed the services described in this document as transcribed by the above individual, and it is both accurate and complete.

## 2024-05-03 ENCOUNTER — OFFICE VISIT (OUTPATIENT)
Dept: FAMILY MEDICINE CLINIC | Facility: CLINIC | Age: 64
End: 2024-05-03
Payer: COMMERCIAL

## 2024-05-03 VITALS
HEIGHT: 65 IN | RESPIRATION RATE: 15 BRPM | OXYGEN SATURATION: 98 % | WEIGHT: 177 LBS | HEART RATE: 57 BPM | TEMPERATURE: 97.7 F | SYSTOLIC BLOOD PRESSURE: 122 MMHG | BODY MASS INDEX: 29.49 KG/M2 | DIASTOLIC BLOOD PRESSURE: 80 MMHG

## 2024-05-03 DIAGNOSIS — Z00.00 ENCOUNTER FOR ANNUAL PHYSICAL EXAM: Primary | ICD-10-CM

## 2024-05-03 DIAGNOSIS — E78.2 MIXED HYPERLIPIDEMIA: Chronic | ICD-10-CM

## 2024-05-03 PROCEDURE — 99396 PREV VISIT EST AGE 40-64: CPT | Performed by: PHYSICIAN ASSISTANT

## 2024-05-03 NOTE — PROGRESS NOTES
"Chief Complaint  Annual Exam and Hyperlipidemia (management)    Subjective          Nesha Kee presents to [facility]  History of Present Illness  Nesha is a 63-year-old female who presents for annual physical exam with hypercholesterolemia  management.  States she recently saw Dr. Devine and was advised to stop her NSAID medication.  Her bowel movements have improved since her colonoscopy.  Diet has been improving.  She has been trying to make healthier choices.  She has been walking for exercise.  Denied any current fevers, chills, upper respiratory symptoms, chest pain, shortness of air, cough, wheezing, abdominal pain, GI upset or swelling of ankles.     Objective   Vital Signs:   /80 (BP Location: Left arm, Patient Position: Sitting, Cuff Size: Adult)   Pulse 57   Temp 97.7 °F (36.5 °C)   Resp 15   Ht 165.1 cm (65\")   Wt 80.3 kg (177 lb)   SpO2 98%   BMI 29.45 kg/m²     Physical Exam  Vitals and nursing note reviewed.   Constitutional:       Appearance: Normal appearance. She is well-developed, well-groomed and overweight.   HENT:      Head: Normocephalic and atraumatic.      Jaw: There is normal jaw occlusion.      Right Ear: Hearing, tympanic membrane, ear canal and external ear normal.      Left Ear: Hearing, tympanic membrane, ear canal and external ear normal.      Nose: Nose normal.      Right Sinus: No maxillary sinus tenderness or frontal sinus tenderness.      Left Sinus: No maxillary sinus tenderness or frontal sinus tenderness.      Mouth/Throat:      Lips: Pink.      Mouth: Mucous membranes are moist.      Dentition: Normal dentition.      Tongue: No lesions.      Pharynx: Oropharynx is clear. Uvula midline.      Tonsils: No tonsillar exudate.   Eyes:      General: Lids are normal.      Conjunctiva/sclera: Conjunctivae normal.      Pupils: Pupils are equal, round, and reactive to light.   Neck:      Thyroid: No thyroid mass, thyromegaly or thyroid " tenderness.      Vascular: No carotid bruit.      Trachea: Trachea and phonation normal. No tracheal tenderness.   Cardiovascular:      Rate and Rhythm: Normal rate and regular rhythm.      Pulses: Normal pulses.      Heart sounds: Normal heart sounds, S1 normal and S2 normal. No murmur heard.  Pulmonary:      Effort: Pulmonary effort is normal.      Breath sounds: Normal breath sounds and air entry.   Abdominal:      General: Bowel sounds are normal.      Palpations: Abdomen is soft.      Tenderness: There is no abdominal tenderness. There is no right CVA tenderness, left CVA tenderness, guarding or rebound. Negative signs include Platt's sign, Rovsing's sign, McBurney's sign, psoas sign and obturator sign.   Musculoskeletal:      Cervical back: Neck supple.      Right lower leg: No edema.      Left lower leg: No edema.   Lymphadenopathy:      Cervical: No cervical adenopathy.      Right cervical: No superficial, deep or posterior cervical adenopathy.     Left cervical: No superficial, deep or posterior cervical adenopathy.   Skin:     General: Skin is warm and dry.      Capillary Refill: Capillary refill takes less than 2 seconds.   Neurological:      Mental Status: She is alert and oriented to person, place, and time.      Deep Tendon Reflexes:      Reflex Scores:       Patellar reflexes are 2+ on the right side and 2+ on the left side.  Psychiatric:         Attention and Perception: Attention and perception normal.         Mood and Affect: Mood and affect normal.         Speech: Speech normal.         Behavior: Behavior is cooperative.         Thought Content: Thought content normal.         Cognition and Memory: Cognition and memory normal.         Judgment: Judgment normal.        Result Review :       Admission on 04/22/2024, Discharged on 04/22/2024   Component Date Value Ref Range Status    Case Report 04/22/2024    Final                    Value:Surgical Pathology Report                         Case:  [id number]                                  Authorizing Provider:  Jonathan Devine        Collected:           04/22/2024 11:57 AM                                 MD Shaila                                                                   Ordering Location:     [facility]   Received:            04/22/2024 02:28 PM                                 OR                                                                           Pathologist:           Johnie aMyes MD                                                          Specimens:   1) - Large Intestine, Right / Ascending Colon, Right colon bx                                       2) - Large Intestine, Transverse Colon                                                              3) - Large Intestine, Sigmoid Colon                                                                 4) - Large Intestine, Rectum                                                               Final Diagnosis 04/22/2024    Final                    Value:This result contains rich text formatting which cannot be displayed here.    Gross Description 04/22/2024    Final                    Value:This result contains rich text formatting which cannot be displayed here.   Results Encounter on 04/20/2024   Component Date Value Ref Range Status    Total Cholesterol 04/22/2024 261 (H)  0 - 200 mg/dL Final    Comment: Cholesterol Reference Ranges  (U.S. Department of Health and Human Services ATP III  Classifications)  Desirable          <200 mg/dL  Borderline High    200-239 mg/dL  High Risk          >240 mg/dL  Triglyceride Reference Ranges  (U.S. Department of Health and Human Services ATP III  Classifications)  Normal           <150 mg/dL  Borderline High  150-199 mg/dL  High             200-499 mg/dL  Very High        >500 mg/dL  HDL Reference Ranges  (U.S. Department of Health and Human Services ATP III  Classifications)  Low     <40 mg/dl (major risk factor for CHD)  High    >60  mg/dl ('negative' risk factor for CHD)  LDL Reference Ranges  (U.S. Department of Health and Human Services ATP III  Classifications)  Optimal          <100 mg/dL  Near Optimal     100-129 mg/dL  Borderline High  130-159 mg/dL  High             160-189 mg/dL  Very High        >189 mg/dL      Triglycerides 04/22/2024 118  0 - 150 mg/dL Final    HDL Cholesterol 04/22/2024 84 (H)  40 - 60 mg/dL Final    VLDL Cholesterol Philippe 04/22/2024 20  5 - 40 mg/dL Final    LDL Chol Calc (NIH) 04/22/2024 157 (H)  0 - 100 mg/dL Final    LDL/HDL RATIO 04/22/2024 1.83   Final    Glucose 04/22/2024 92  65 - 99 mg/dL Final    BUN 04/22/2024 12  8 - 23 mg/dL Final    Creatinine 04/22/2024 1.20 (H)  0.57 - 1.00 mg/dL Final    EGFR Result 04/22/2024 50.7 (L)  >60.0 mL/min/1.73 Final    Comment: GFR Normal >60  Chronic Kidney Disease <60  Kidney Failure <15      BUN/Creatinine Ratio 04/22/2024 10.0  7.0 - 25.0 Final    Sodium 04/22/2024 140  136 - 145 mmol/L Final    Potassium 04/22/2024 3.9  3.5 - 5.2 mmol/L Final    Chloride 04/22/2024 101  98 - 107 mmol/L Final    Total CO2 04/22/2024 28.8  22.0 - 29.0 mmol/L Final    Calcium 04/22/2024 9.9  8.6 - 10.5 mg/dL Final    Total Protein 04/22/2024 6.9  6.0 - 8.5 g/dL Final    Albumin 04/22/2024 4.4  3.5 - 5.2 g/dL Final    Globulin 04/22/2024 2.5  gm/dL Final    A/G Ratio 04/22/2024 1.8  g/dL Final    Total Bilirubin 04/22/2024 0.9  0.0 - 1.2 mg/dL Final    Alkaline Phosphatase 04/22/2024 63  39 - 117 U/L Final    AST (SGOT) 04/22/2024 19  1 - 32 U/L Final    ALT (SGPT) 04/22/2024 25  1 - 33 U/L Final    WBC 04/22/2024 5.46  3.40 - 10.80 10*3/mm3 Final    RBC 04/22/2024 4.76  3.77 - 5.28 10*6/mm3 Final    Hemoglobin 04/22/2024 15.3  12.0 - 15.9 g/dL Final    Hematocrit 04/22/2024 45.2  34.0 - 46.6 % Final    MCV 04/22/2024 95.0  79.0 - 97.0 fL Final    MCH 04/22/2024 32.1  26.6 - 33.0 pg Final    MCHC 04/22/2024 33.8  31.5 - 35.7 g/dL Final    RDW 04/22/2024 12.3  12.3 - 15.4 % Final     Platelets 04/22/2024 272  140 - 450 10*3/mm3 Final    Neutrophil Rel % 04/22/2024 70.7  42.7 - 76.0 % Final    Lymphocyte Rel % 04/22/2024 20.1  19.6 - 45.3 % Final    Monocyte Rel % 04/22/2024 6.4  5.0 - 12.0 % Final    Eosinophil Rel % 04/22/2024 1.5  0.3 - 6.2 % Final    Basophil Rel % 04/22/2024 1.1  0.0 - 1.5 % Final    Neutrophils Absolute 04/22/2024 3.86  1.70 - 7.00 10*3/mm3 Final    Lymphocytes Absolute 04/22/2024 1.10  0.70 - 3.10 10*3/mm3 Final    Monocytes Absolute 04/22/2024 0.35  0.10 - 0.90 10*3/mm3 Final    Eosinophils Absolute 04/22/2024 0.08  0.00 - 0.40 10*3/mm3 Final    Basophils Absolute 04/22/2024 0.06  0.00 - 0.20 10*3/mm3 Final    Immature Granulocyte Rel % 04/22/2024 0.2  0.0 - 0.5 % Final    Immature Grans Absolute 04/22/2024 0.01  0.00 - 0.05 10*3/mm3 Final    nRBC 04/22/2024 0.0  0.0 - 0.2 /100 WBC Final    TSH 04/22/2024 2.560  0.270 - 4.200 uIU/mL Final    25 Hydroxy, Vitamin D 04/22/2024 47.2  30.0 - 100.0 ng/ml Final    Comment: Reference Range for Total Vitamin D 25(OH)  Deficiency <20.0 ng/mL  Insufficiency 21-29 ng/mL  Sufficiency  ng/mL  Toxicity >100 ng/ml                     Assessment and Plan    Diagnoses and all orders for this visit:    1. Encounter for annual physical exam (Primary)    2. Mixed hyperlipidemia      Nesha was seen in office today for annual physical exam with chronic and stable hyperlipidemia: Have reviewed above blood work with her at office visit today.  Creatinine level was slightly elevated.  I suspect this was related to her recent NSAID usage which she has stopped.  Will recheck fasting blood work in near future.  She will take Tylenol in place of NSAIDs.  Cholesterol readings were slightly elevated.  Stressed the importance of decreasing her fatty food in diet and increase physical activity.  She will return to office in 1 year for annual physical exam.    Patient Counseling:  --Nutrition: Stressed importance of moderation in sodium/caffeine  intake, saturated fat and cholesterol.  Discussed caloric balance, sufficient intake of fresh fruits, vegetables, fiber,   calcium, iron.  --Discussed the new recommendation against daily use of baby aspirin for primary prevention in low risk patients.  --Exercise: Stressed the importance of regular exercise by incorporating into daily routine.    --Substance Abuse: Discussed cessation/primary prevention of tobacco, alcohol, or other drug use; driving or other dangerous activities under the influence.    --Dental health: Discussed importance of regular tooth brushing, flossing, and dental visits.  -- Suggested having eyes and vision checked if needed or past due.  --Immunizations reviewed.  --Discussed benefits of screening colonoscopy.    I spent 30 minutes caring for Nesha on this date of service. This time includes time spent by me in the following activities:preparing for the visit, reviewing tests, obtaining and/or reviewing a separately obtained history, performing a medically appropriate examination and/or evaluation , counseling and educating the patient/family/caregiver, and documenting information in the medical record  Follow Up   Return in about 1 year (around 5/3/2025), or labs prior with female, for Annual.  Patient was given instructions and counseling regarding her condition or for health maintenance advice. Please see specific information pulled into the AVS if appropriate.     REA Shannon [facility]  [address] [place] [id number]  Dept: [phone]  Dept Fax: [phone]  Loc: [phone]  Loc Fax: [phone]

## 2024-06-18 DIAGNOSIS — K51.00 ULCERATIVE PANCOLITIS WITHOUT COMPLICATION: ICD-10-CM

## 2024-06-18 NOTE — TELEPHONE ENCOUNTER
Patient called requesting refill on medication. She stated Dr. Devine increased to 4 pills daily. Will send to APRN to review.

## 2024-06-19 RX ORDER — MESALAMINE 1.2 G/1
4.8 TABLET, DELAYED RELEASE ORAL
Qty: 360 TABLET | Refills: 3 | Status: SHIPPED | OUTPATIENT
Start: 2024-06-19

## 2024-07-03 ENCOUNTER — OFFICE VISIT (OUTPATIENT)
Dept: GASTROENTEROLOGY | Facility: CLINIC | Age: 64
End: 2024-07-03
Payer: COMMERCIAL

## 2024-07-03 VITALS
DIASTOLIC BLOOD PRESSURE: 90 MMHG | WEIGHT: 180.8 LBS | SYSTOLIC BLOOD PRESSURE: 132 MMHG | HEIGHT: 65 IN | BODY MASS INDEX: 30.12 KG/M2

## 2024-07-03 DIAGNOSIS — Z86.010 PERSONAL HISTORY OF COLONIC POLYPS: ICD-10-CM

## 2024-07-03 DIAGNOSIS — K57.30 DIVERTICULOSIS OF LARGE INTESTINE WITHOUT HEMORRHAGE: ICD-10-CM

## 2024-07-03 DIAGNOSIS — K51.00 ULCERATIVE PANCOLITIS WITHOUT COMPLICATION: Primary | ICD-10-CM

## 2024-07-03 DIAGNOSIS — K21.00 GASTROESOPHAGEAL REFLUX DISEASE WITH ESOPHAGITIS WITHOUT HEMORRHAGE: ICD-10-CM

## 2024-07-03 PROCEDURE — 99213 OFFICE O/P EST LOW 20 MIN: CPT | Performed by: INTERNAL MEDICINE

## 2024-07-03 NOTE — PROGRESS NOTES
PATIENT INFORMATION  Nesha Kee       - 1960    CHIEF COMPLAINT  Chief Complaint   Patient presents with    Ulcerative Colitis       HISTORY OF PRESENT ILLNESS  UC and dealing with a flare , colon and Fecal Philippe agreed so can use that to follow but most likely needs a biologic.    Bms down to 2-3 a day and no further bleeding and still occasional 2/ wk urgent Bms butno incontinence and no nocturnal events    Weaned her self off the budesonide and took 6 weeks of 9 mg and then 2 weeks of 6 mg finished today- is staying n 4 Lialda daily    REVIEWED PERTINENT RESULTS/ LABS  Lab Results   Component Value Date    CASEREPORT  2024     Surgical Pathology Report                         Case: [id number]                                  Authorizing Provider:  Jonathan Devine        Collected:           2024 11:57 AM                                 MD Shaila                                                                   Ordering Location:     [facility]   Received:            2024 02:28 PM                                 OR                                                                           Pathologist:           Johnie Mayes MD                                                          Specimens:   1) - Large Intestine, Right / Ascending Colon, Right colon bx                                       2) - Large Intestine, Transverse Colon                                                              3) - Large Intestine, Sigmoid Colon                                                                 4) - Large Intestine, Rectum                                                               FINALDX  2024     1.  Colon, right, biopsy:    A. No hyperplastic or tubulovillous change.   B. No significant inflammation.   C. No crypt distortion or basement membrane thickening.   D. No viral inclusions or other organisms on routinely stained sections.    2.  Colon,  transverse, biopsy:    A. No hyperplastic or tubulovillous change.   B. No significant inflammation.   C. No crypt distortion or basement membrane thickening.   D. No viral inclusions or other organisms on routinely stained sections.    3.  Colon, sigmoid, biopsy:   A.  Moderate chronic active colitis and repair.   B.  No dysplasia nor malignancy.   C.  No granulomata, diagnostic viral inclusions nor intestinal parasites identified.    4.  Colon, rectum, biopsy:   A.  Fragments of hyperplastic polyp.   B.  No significant inflammation.   C.  No dysplasia nor malignancy.       Lab Results   Component Value Date    HGB 15.3 04/22/2024    MCV 95.0 04/22/2024     04/22/2024    ALT 25 04/22/2024    AST 19 04/22/2024    INR 1.1 06/15/2018    TRIG 118 04/22/2024      No results found.    REVIEW OF SYSTEMS  Review of Systems   Constitutional:  Negative for activity change, chills, fever and unexpected weight change.   HENT:  Negative for congestion.    Eyes:  Negative for visual disturbance.   Respiratory:  Negative for shortness of breath.    Cardiovascular:  Negative for chest pain and palpitations.   Gastrointestinal:  Negative for abdominal pain and blood in stool.   Endocrine: Negative for cold intolerance and heat intolerance.   Genitourinary:  Negative for hematuria.   Musculoskeletal:  Negative for gait problem.   Skin:  Negative for color change.   Allergic/Immunologic: Negative for immunocompromised state.   Neurological:  Negative for weakness and light-headedness.   Hematological:  Negative for adenopathy.   Psychiatric/Behavioral:  Negative for sleep disturbance. The patient is not nervous/anxious.          ACTIVE PROBLEMS  Patient Active Problem List    Diagnosis     Primary osteoarthritis of left knee [M17.12]     COVID-19 virus detected [U07.1]     Secondary hypertension [I15.9]     Encounter for screening for malignant neoplasm of colon [Z12.11]     Personal history of colonic polyps [Z86.010]      Chronic fatigue [R53.82]     Thumb tendonitis [M77.8]     Borderline hypertension [R03.0]     Obesity (BMI 30.0-34.9) [E66.9]     Menopause [Z78.0]     Screening mammogram, encounter for [Z12.31]     Allergic rhinitis due to allergen [J30.9]     Flank pain [R10.9]     Vitamin D insufficiency [E55.9]     Acute foot pain, right [M79.671]     Vaginal vault prolapse [N81.9]     Decreased bladder capacity [N32.89]     Genuine stress incontinence, female [N39.3]     Urinary urgency [R39.15]     Midline cystocele [N81.11]     Mixed stress and urge urinary incontinence [N39.46]     Rectocele [N81.6]     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]     Vaginal vault prolapse after hysterectomy [N99.3]     Pap smear, as part of routine gynecological examination [Z01.419]     Cystocele with rectocele [N81.10, N81.6]     Hip pain [M25.559]     Encounter for annual physical exam [Z00.00]     Right hip pain [M25.551]     Cervical radiculopathy [M54.12]     Contact dermatitis due to poison ivy [L23.7]     Degeneration of intervertebral disc of cervical region [M50.30]     Depression [F32.A]     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]     Groin strain [S76.219A]     Hematuria [R31.9]     Mixed hyperlipidemia [E78.2]     Microscopic hematuria [R31.29]     Neck pain [M54.2]     Pain in pelvis [R10.2]     Shoulder pain [M25.519]     Ulcerative colitis [K51.90]     Urinary tract infection without hematuria [N39.0]     Vaginal prolapse [N81.10]     Abnormal electrocardiogram [R94.31]     Diverticulosis of large intestine [K57.30]     Chronic thyroiditis [E06.5]          PAST MEDICAL HISTORY  Past Medical History:   Diagnosis Date    Allergic     Anxiety     Arthritis of neck     Colon polyp     Headache     Hip arthrosis     Hx of ulcer disease     Hyperlipidemia     Hypertension     Knee swelling     Menstrual problem     Thyroid disease     Ulcerative colitis 1975         SURGICAL HISTORY  Past Surgical History:   Procedure Laterality Date    BACK SURGERY       BUNIONECTOMY      CHOLECYSTECTOMY  12/10    COLONOSCOPY N/A 12/20/2021    Procedure: COLONOSCOPY WITH BIOPSIES;  Surgeon: Jonathan Devine MD;  Location:  [facility];  Service: Gastroenterology;  Laterality: N/A;  DIVERTICULOSIS      COLONOSCOPY N/A 04/22/2024    Procedure: COLONOSCOPY WITH BIOPSY;  Surgeon: Jonathan Devine MD;  Location:  [facility];  Service: Gastroenterology;  Laterality: N/A;  Diverticulosis; Ulcerative colitis; Right colon bx; Transverse colon bx; Sigmoid bx; Rectal bx    CYSTOCELE REPAIR      EYE PTOSIS REPAIR Bilateral 08/02/2021    HERNIA REPAIR      HYSTERECTOMY      LUMBAR DISCECTOMY  1993         FAMILY HISTORY  Family History   Problem Relation Age of Onset    Cancer Mother     Heart failure Mother     Hypertension Mother     Heart attack Father 80    Hypertension Sister     Diabetes Sister     Glaucoma Sister     Lung disease Daughter     Alcohol abuse Paternal Grandfather     Ulcerative colitis Sister     Breast cancer Neg Hx     Malig Hyperthermia Neg Hx          SOCIAL HISTORY  Social History     Occupational History    Not on file   Tobacco Use    Smoking status: Never     Passive exposure: Past    Smokeless tobacco: Never   Vaping Use    Vaping status: Never Used   Substance and Sexual Activity    Alcohol use: No    Drug use: No    Sexual activity: Yes     Partners: Male     Birth control/protection: Hysterectomy         CURRENT MEDICATIONS    Current Outpatient Medications:     Cholecalciferol (VITAMIN D) 2000 units capsule, Take 1 capsule by mouth Daily., Disp: , Rfl:     fluticasone (FLONASE) 50 MCG/ACT nasal spray, 2 sprays by Each Nare route Daily., Disp: 16 g, Rfl: 6    hydrOXYzine (ATARAX) 25 MG tablet, Take 1 tablet by mouth 3 (Three) Times a Day As Needed for Anxiety., Disp: 30 tablet, Rfl: 2    levocetirizine (XYZAL ALLERGY 24HR) 5 MG tablet, Take 1 tablet by mouth Every Evening., Disp: 30 tablet, Rfl: 12    mesalamine (LIALDA) 1.2 g EC tablet, Take  "4 tablets by mouth Daily With Breakfast., Disp: 360 tablet, Rfl: 3    omeprazole (priLOSEC) 40 MG capsule, Take 1 capsule by mouth Daily., Disp: 90 capsule, Rfl: 3    Budesonide (ENTOCORT EC) 3 MG 24 hr capsule, 3 tablets by mouth daily for 6 weeks, then 2 tablets by mouth daily for 2 weeks (Patient not taking: Reported on 5/3/2024), Disp: 154 capsule, Rfl: 0    Probiotic Product (PROBIOTIC DAILY PO), Take 1 tablet by mouth Daily. (Patient not taking: Reported on 7/3/2024), Disp: , Rfl:     ALLERGIES  Iodinated contrast media, Other, Sulfa antibiotics, and Nsaids    VITALS  Vitals:    07/03/24 0923   BP: 132/90   BP Location: Left arm   Patient Position: Sitting   Cuff Size: Adult   Weight: 82 kg (180 lb 12.8 oz)   Height: 165.1 cm (65\")       PHYSICAL EXAM  Debilities/Disabilities Identified: None  Emotional Behavior: Appropriate  Wt Readings from Last 3 Encounters:   07/03/24 82 kg (180 lb 12.8 oz)   05/03/24 80.3 kg (177 lb)   04/29/24 79.4 kg (175 lb)     Ht Readings from Last 1 Encounters:   07/03/24 165.1 cm (65\")     Body mass index is 30.09 kg/m².  Physical Exam  Constitutional:       Appearance: She is well-developed. She is not diaphoretic.   Eyes:      General: No scleral icterus.     Conjunctiva/sclera: Conjunctivae normal.      Pupils: Pupils are equal, round, and reactive to light.   Neck:      Thyroid: No thyromegaly.   Cardiovascular:      Rate and Rhythm: Normal rate and regular rhythm.      Heart sounds: Normal heart sounds. No murmur heard.     No gallop.   Pulmonary:      Effort: Pulmonary effort is normal.      Breath sounds: Normal breath sounds. No wheezing or rales.   Abdominal:      General: Bowel sounds are normal. There is no distension or abdominal bruit.      Palpations: Abdomen is soft. There is no shifting dullness, fluid wave or mass.      Tenderness: There is no abdominal tenderness. There is no guarding. Negative signs include Platt's sign.      Hernia: There is no hernia in the " ventral area.   Musculoskeletal:         General: Normal range of motion.      Cervical back: Normal range of motion and neck supple.   Lymphadenopathy:      Cervical: No cervical adenopathy.   Skin:     General: Skin is warm and dry.      Findings: No erythema or rash.   Neurological:      Mental Status: She is alert and oriented to person, place, and time.       CLINICAL DATA REVIEWED   reviewed previous lab results and integrated with today's visit, reviewed notes from other physicians and/or last GI encounter, reviewed previous endoscopy results and available photos, reviewed surgical pathology results from previous biopsies    ASSESSMENT  Diagnoses and all orders for this visit:    Ulcerative pancolitis without complication  -     Calprotectin, Fecal - Stool, Per Rectum    Personal history of colonic polyps    Gastroesophageal reflux disease with esophagitis without hemorrhage    Diverticulosis of large intestine without hemorrhage          PLAN  Getting a baseline (hopefully normal ) Fecal Calprotectin    Return in about 1 year (around 7/3/2025).    I have discussed the above plan with the patient.  They verbalize understanding and are in agreement with the plan.  They have been advised to contact the office for any questions, concerns, or changes related to their health.

## 2024-07-06 ENCOUNTER — LAB (OUTPATIENT)
Dept: LAB | Facility: HOSPITAL | Age: 64
End: 2024-07-06
Payer: COMMERCIAL

## 2024-07-06 PROCEDURE — 83993 ASSAY FOR CALPROTECTIN FECAL: CPT | Performed by: INTERNAL MEDICINE

## 2024-07-10 LAB — CALPROTECTIN STL-MCNT: 29 UG/G (ref 0–120)

## 2024-09-09 ENCOUNTER — TELEPHONE (OUTPATIENT)
Dept: FAMILY MEDICINE CLINIC | Facility: CLINIC | Age: 64
End: 2024-09-09
Payer: COMMERCIAL

## 2024-09-09 DIAGNOSIS — J30.89 NON-SEASONAL ALLERGIC RHINITIS DUE TO OTHER ALLERGIC TRIGGER: ICD-10-CM

## 2024-09-09 RX ORDER — FLUTICASONE PROPIONATE 50 MCG
2 SPRAY, SUSPENSION (ML) NASAL DAILY
Qty: 16 G | Refills: 1 | Status: SHIPPED | OUTPATIENT
Start: 2024-09-09

## 2024-09-09 NOTE — TELEPHONE ENCOUNTER
HUB TO RELAY:   Tried to call patient to see if she would like to establish care with another provider here in the office, since her PCP has retired. Patient did not answer, LVM.

## 2025-01-02 ENCOUNTER — HOSPITAL ENCOUNTER (OUTPATIENT)
Dept: GENERAL RADIOLOGY | Facility: HOSPITAL | Age: 65
Discharge: HOME | End: 2025-01-02
Payer: MEDICARE

## 2025-01-02 ENCOUNTER — TELEPHONE (OUTPATIENT)
Dept: FAMILY MEDICINE CLINIC | Facility: CLINIC | Age: 65
End: 2025-01-02

## 2025-01-02 ENCOUNTER — OFFICE VISIT (OUTPATIENT)
Dept: FAMILY MEDICINE CLINIC | Facility: CLINIC | Age: 65
End: 2025-01-02
Payer: MEDICARE

## 2025-01-02 VITALS
WEIGHT: 184.1 LBS | BODY MASS INDEX: 30.67 KG/M2 | DIASTOLIC BLOOD PRESSURE: 98 MMHG | OXYGEN SATURATION: 95 % | HEART RATE: 92 BPM | HEIGHT: 65 IN | TEMPERATURE: 97.3 F | SYSTOLIC BLOOD PRESSURE: 138 MMHG

## 2025-01-02 DIAGNOSIS — R03.0 ELEVATED BLOOD PRESSURE READING: ICD-10-CM

## 2025-01-02 DIAGNOSIS — E78.2 MIXED HYPERLIPIDEMIA: ICD-10-CM

## 2025-01-02 DIAGNOSIS — Z13.1 ENCOUNTER FOR SCREENING EXAMINATION FOR INTERMEDIATE HYPERGLYCEMIA AND DIABETES MELLITUS: ICD-10-CM

## 2025-01-02 DIAGNOSIS — E55.9 VITAMIN D DEFICIENCY: ICD-10-CM

## 2025-01-02 DIAGNOSIS — M20.009 FINGER DEFORMITY: ICD-10-CM

## 2025-01-02 DIAGNOSIS — Z76.89 ENCOUNTER TO ESTABLISH CARE: Primary | ICD-10-CM

## 2025-01-02 DIAGNOSIS — E06.5 CHRONIC THYROIDITIS: ICD-10-CM

## 2025-01-02 DIAGNOSIS — K51.00 ULCERATIVE PANCOLITIS WITHOUT COMPLICATION: ICD-10-CM

## 2025-01-02 DIAGNOSIS — N18.31 STAGE 3A CHRONIC KIDNEY DISEASE (CKD): ICD-10-CM

## 2025-01-02 DIAGNOSIS — R51.9 INTRACTABLE EPISODIC HEADACHE, UNSPECIFIED HEADACHE TYPE: ICD-10-CM

## 2025-01-02 PROCEDURE — 73130 X-RAY EXAM OF HAND: CPT

## 2025-01-02 RX ORDER — MULTIVIT AND MINERALS-FERROUS GLUCONATE 9 MG IRON/15 ML ORAL LIQUID 9 MG/15 ML
LIQUID (ML) ORAL DAILY
COMMUNITY

## 2025-01-02 NOTE — TELEPHONE ENCOUNTER
Spoke with patient. Patient understands and has no further questions.    ----- Message from Alisson Hayes sent at 1/2/2025  1:07 PM EST -----    Will you let Nesha know that her x-ray of her hand shows significant arthritis with a deformity at the fourth finger on the left hand with no fracture.  I have added on some additional labs to check for rheumatoid arthritis.  She will get those collected with her other labs tomorrow at her lab appointment.  I still think she should go to the hand specialist to see if any sort of surgical repair is needed for that finger.

## 2025-01-02 NOTE — PROGRESS NOTES
Patient or patient representative verbalized consent for the use of Ambient Listening during the visit with  JUDIE Pete for chart documentation. 1/2/2025  11:48 EST    Chief Complaint   Patient presents with    Establish Care       Subjective      Patient ID: Nesha is a 64 y.o. female.     Chief Complaint   Patient presents with    Establish Care        History of Present Illness     History of Present Illness  Nesha Kee was a patient of Sari Yanes PA-C who is no longer with our practice.  I will be taking over this patient's primary care.  This is the first time patient is seeing me. She is here to establish care and for chronic care management of hyperlipidemia, UC, and history of elevated creatinine with concurrent NSAID use.    HLD - last fasting TC in April 261 with . She was counseled on lifestyle modifications at her physical in May and is not currently on cholesterol medication.    UC - followed by Dr. Devine. Last colonoscopy 4/24 with focal colitis, due for repeat in 3 years. Currently prescribed mesalamine. Completed a 2 month course of budesonide in July for a flare. She reports no blood or mucus in her stool.    CKD - last CMP with creatinine of 1.20 and GFR 50.7 in April. Has since stopped all NSAID use.     Review of PMH - She has a history of allergies, anxiety, arthritis, colon polyps, headaches, ulcers, high cholesterol, high blood pressure, and hyperthyroidism, which has been in remission for several years. Her surgical history includes lumbar disc surgery for a herniated disc, bunionectomy, gallbladder removal, colonoscopies, cystocele repair, eye ptosis repair, hernia repair, and hysterectomy. She is postmenopausal.    Elevated BP - She experiences intermittent high blood pressure, with a slightly elevated reading in 10/2023, but a normal reading at Dr. Devine's office. She can monitor her blood pressure at home using an arm cuff.  She took ibuprofen for  "a mild headache yesterday, otherwise no medication use that might elevate her blood pressure. She reports no ankle swelling, chest pain, lightheadedness, blurry vision, or tinnitus. She occasionally experiences headaches.     Left 4th digit deformity - She jammed her left fourth digit a long time ago and has had issues with it getting caught in the seatbelt. She is considering treatment as she is unable to wear a ring on it. She has not had x-rays.      SOCIAL HISTORY  She has never been a smoker. She is not currently drinking alcohol. No recreational drug use. She denies e-cigarettes or vaping. She is officially retired as of yesterday. She has 3 adult children.    FAMILY HISTORY  Her mother had small cell cancer, heart failure, and hypertension. Her father had a heart attack. Her sister has hypertension, diabetes, and glaucoma. She has another sister with ulcerative colitis.    ALLERGIES  She is allergic to IODINE, CONTRAST, SULFA, and NSAIDs.       The following portions of the patient's history were reviewed and updated as appropriate: allergies, current medications, past family history, past medical history, past social history, past surgical history, and problem list.      Current Outpatient Medications:     Cholecalciferol (VITAMIN D) 2000 units capsule, Take 1 capsule by mouth Daily., Disp: , Rfl:     mesalamine (LIALDA) 1.2 g EC tablet, Take 4 tablets by mouth Daily With Breakfast., Disp: 360 tablet, Rfl: 3    Multiple Vitamins-Minerals (multivitamin and minerals) liquid liquid, Take  by mouth Daily., Disp: , Rfl:         Results  Laboratory Studies  Total cholesterol 261, .    Imaging  Colonoscopy consistent with a flare of ulcerative colitis. Kidney dysfunction identified, likely due to NSAID use.        Objective      /98   Pulse 92   Temp 97.3 °F (36.3 °C) (Infrared)   Ht 165.1 cm (65\")   Wt 83.5 kg (184 lb 1.6 oz)   SpO2 95%   BMI 30.64 kg/m²      Body mass index is 30.64 kg/m².      "      Physical Exam  Vitals reviewed.   Constitutional:       General: She is not in acute distress.     Appearance: Normal appearance. She is obese.   HENT:      Head: Normocephalic and atraumatic.      Right Ear: Tympanic membrane normal.      Left Ear: Tympanic membrane normal.      Mouth/Throat:      Mouth: Mucous membranes are moist.      Pharynx: Oropharynx is clear. No oropharyngeal exudate.   Eyes:      Conjunctiva/sclera: Conjunctivae normal.      Pupils: Pupils are equal, round, and reactive to light.   Neck:      Thyroid: No thyromegaly.      Vascular: No carotid bruit or JVD.   Cardiovascular:      Rate and Rhythm: Normal rate and regular rhythm.      Pulses: Normal pulses.      Heart sounds: Normal heart sounds. No murmur heard.     No friction rub.   Pulmonary:      Effort: Pulmonary effort is normal. No respiratory distress.      Breath sounds: Normal breath sounds. No wheezing.   Abdominal:      General: Abdomen is protuberant. Bowel sounds are normal.      Palpations: Abdomen is soft. There is no mass.      Tenderness: There is no abdominal tenderness.      Hernia: No hernia is present.   Musculoskeletal:      Left hand: Deformity present. Decreased range of motion.      Cervical back: Neck supple.      Right lower leg: No edema.      Left lower leg: No edema.      Comments: Left hand fourth digit PIP enlargement with visible deformity   Lymphadenopathy:      Cervical: No cervical adenopathy.   Skin:     General: Skin is warm and dry.      Capillary Refill: Capillary refill takes less than 2 seconds.   Neurological:      General: No focal deficit present.      Mental Status: She is alert and oriented to person, place, and time.   Psychiatric:         Mood and Affect: Mood normal.         Behavior: Behavior normal.          Physical Exam          Assessment & Plan      Assessment & Plan       1. Encounter to establish care  Reviewed all pertinent medical, family, surgical, and social history today.  Her cholesterol levels will be rechecked. Lifestyle modifications were recommended. A lab appointment will be scheduled at her convenience, along with a urine test. Annual Medicare wellness visit will be scheduled for May 2024.    2. Mixed hyperlipidemia  Will check fasting labs today and await results for further recommendation.  - Lipid Panel With / Chol / HDL Ratio; Future  - CBC (No Diff); Future  - Comprehensive Metabolic Panel; Future  - Hemoglobin A1c; Future  - TSH Rfx On Abnormal To Free T4; Future  - Vitamin D,25-Hydroxy; Future  - Microalbumin / Creatinine Urine Ratio - Urine, Clean Catch; Future    3. Stage 3a chronic kidney disease (CKD)  Labs in April 2023 also revealed kidney dysfunction, likely related to her NSAID use. She has since discontinued all NSAIDs.    4. Elevated blood pressure reading  5. Intractable episodic headache, unspecified headache type  Her blood pressure readings were slightly elevated during today's visit. Potential causes of elevated blood pressure, including NSAIDs, were discussed. Home blood pressure monitoring was recommended, with instructions on how to check her blood pressure daily or twice daily. She was also provided with a blood pressure log. If her blood pressure exceeds 130/80, further treatment will be necessary.  - Lipid Panel With / Chol / HDL Ratio; Future  - CBC (No Diff); Future  - Comprehensive Metabolic Panel; Future  - Hemoglobin A1c; Future  - TSH Rfx On Abnormal To Free T4; Future  - Vitamin D,25-Hydroxy; Future  - Microalbumin / Creatinine Urine Ratio - Urine, Clean Catch; Future    6. Chronic thyroiditis  - Lipid Panel With / Chol / HDL Ratio; Future  - CBC (No Diff); Future  - Comprehensive Metabolic Panel; Future  - Hemoglobin A1c; Future  - TSH Rfx On Abnormal To Free T4; Future  - Vitamin D,25-Hydroxy; Future  - Microalbumin / Creatinine Urine Ratio - Urine, Clean Catch; Future    7. Finger deformity  A referral to a hand specialist was made. Xray  today with the following: Erosive changes are observed throughout the proximal and distal interphalangeal joints. There is concentric joint space narrowing and subchondral sclerosis. There is a winged type appearance to these changes at the proximal interphalangeal joint of the fourth digit. The findings are most pronounced at the proximal interphalangeal joint of the fourth digit. There is associated ulnar subluxation of the PIP of the fourth digit resulting in deformity. Productive type osteophytosis is also noted. These findings are mild to moderate. There is no fracture or gross dislocation. Will check additional labs to assess for RA include CRISPIN with reflex, ESR, and CRP.   - Ambulatory Referral to Hand Surgery  - XR Hand 3+ View Left; Future    8. Encounter for screening examination for intermediate hyperglycemia and diabetes mellitus  - Hemoglobin A1c; Future    9. Vitamin D deficiency  - Vitamin D,25-Hydroxy; Future    10. Ulcerative pancolitis without complication  Her colonoscopy in April 2023 was consistent with a flare-up of her ulcerative colitis. She follows up with Dr. Devine and completed a course of budesonide in July 2023. She is currently on mesalamine. If her ulcerative colitis is well-managed, annual follow-ups will be necessary unless a flare-up occurs.       Return in about 2 weeks (around 1/16/2025) for bp check, labs in the next few days, xray today.       JUDIE Pete           Note to patient: The 21st Century Cures Act makes medical notes like these available to patients in the interest of transparency. However, be advised this is a medical document. It is intended as peer to peer communication. It is written in medical language and may contain abbreviations or verbiage that are unfamiliar. It may appear blunt or direct. Medical documents are intended to carry relevant information, facts as evident, and the clinical opinion of the practitioner.

## 2025-01-06 LAB
25(OH)D3+25(OH)D2 SERPL-MCNC: 56.1 NG/ML (ref 30–100)
ALBUMIN SERPL-MCNC: 4.5 G/DL (ref 3.5–5.2)
ALBUMIN/CREAT UR: 4 MG/G CREAT (ref 0–29)
ALBUMIN/GLOB SERPL: 1.8 G/DL
ALP SERPL-CCNC: 68 U/L (ref 39–117)
ALT SERPL-CCNC: 23 U/L (ref 1–33)
ANA SER QL: NEGATIVE
AST SERPL-CCNC: 18 U/L (ref 1–32)
BILIRUB SERPL-MCNC: 0.6 MG/DL (ref 0–1.2)
BUN SERPL-MCNC: 18 MG/DL (ref 8–23)
BUN/CREAT SERPL: 20.2 (ref 7–25)
CALCIUM SERPL-MCNC: 9.9 MG/DL (ref 8.6–10.5)
CHLORIDE SERPL-SCNC: 103 MMOL/L (ref 98–107)
CHOLEST SERPL-MCNC: 247 MG/DL (ref 0–200)
CHOLEST/HDLC SERPL: 4.41 {RATIO}
CO2 SERPL-SCNC: 27.8 MMOL/L (ref 22–29)
CREAT SERPL-MCNC: 0.89 MG/DL (ref 0.57–1)
CREAT UR-MCNC: 229.6 MG/DL
CRP SERPL-MCNC: <0.3 MG/DL (ref 0–0.5)
EGFRCR SERPLBLD CKD-EPI 2021: 72.1 ML/MIN/1.73
ERYTHROCYTE [DISTWIDTH] IN BLOOD BY AUTOMATED COUNT: 12.3 % (ref 12.3–15.4)
ERYTHROCYTE [SEDIMENTATION RATE] IN BLOOD BY WESTERGREN METHOD: 16 MM/HR (ref 0–30)
GLOBULIN SER CALC-MCNC: 2.5 GM/DL
GLUCOSE SERPL-MCNC: 100 MG/DL (ref 65–99)
HBA1C MFR BLD: 5.5 % (ref 4.8–5.6)
HCT VFR BLD AUTO: 43.1 % (ref 34–46.6)
HDLC SERPL-MCNC: 56 MG/DL (ref 40–60)
HGB BLD-MCNC: 14.3 G/DL (ref 12–15.9)
LDLC SERPL CALC-MCNC: 146 MG/DL (ref 0–100)
MCH RBC QN AUTO: 31.3 PG (ref 26.6–33)
MCHC RBC AUTO-ENTMCNC: 33.2 G/DL (ref 31.5–35.7)
MCV RBC AUTO: 94.3 FL (ref 79–97)
MICROALBUMIN UR-MCNC: 10.2 UG/ML
PLATELET # BLD AUTO: 241 10*3/MM3 (ref 140–450)
POTASSIUM SERPL-SCNC: 4.5 MMOL/L (ref 3.5–5.2)
PROT SERPL-MCNC: 7 G/DL (ref 6–8.5)
RBC # BLD AUTO: 4.57 10*6/MM3 (ref 3.77–5.28)
SODIUM SERPL-SCNC: 140 MMOL/L (ref 136–145)
TRIGL SERPL-MCNC: 250 MG/DL (ref 0–150)
TSH SERPL DL<=0.005 MIU/L-ACNC: 0.95 UIU/ML (ref 0.27–4.2)
VLDLC SERPL CALC-MCNC: 45 MG/DL (ref 5–40)
WBC # BLD AUTO: 6.07 10*3/MM3 (ref 3.4–10.8)

## 2025-01-16 ENCOUNTER — OFFICE VISIT (OUTPATIENT)
Dept: FAMILY MEDICINE CLINIC | Facility: CLINIC | Age: 65
End: 2025-01-16
Payer: MEDICARE

## 2025-01-16 VITALS
HEIGHT: 65 IN | OXYGEN SATURATION: 97 % | TEMPERATURE: 97.3 F | DIASTOLIC BLOOD PRESSURE: 90 MMHG | HEART RATE: 82 BPM | WEIGHT: 187.3 LBS | SYSTOLIC BLOOD PRESSURE: 138 MMHG | BODY MASS INDEX: 31.21 KG/M2

## 2025-01-16 DIAGNOSIS — M15.4 EROSIVE OSTEOARTHRITIS OF BOTH HANDS: ICD-10-CM

## 2025-01-16 DIAGNOSIS — E78.2 MIXED HYPERLIPIDEMIA: ICD-10-CM

## 2025-01-16 DIAGNOSIS — R09.81 NASAL CONGESTION: ICD-10-CM

## 2025-01-16 DIAGNOSIS — I10 PRIMARY HYPERTENSION: Primary | ICD-10-CM

## 2025-01-16 DIAGNOSIS — J01.10 ACUTE NON-RECURRENT FRONTAL SINUSITIS: ICD-10-CM

## 2025-01-16 PROCEDURE — 99214 OFFICE O/P EST MOD 30 MIN: CPT

## 2025-01-16 PROCEDURE — 1126F AMNT PAIN NOTED NONE PRSNT: CPT

## 2025-01-16 RX ORDER — FLUTICASONE PROPIONATE 50 MCG
2 SPRAY, SUSPENSION (ML) NASAL DAILY
Qty: 16 G | Refills: 5 | Status: SHIPPED | OUTPATIENT
Start: 2025-01-16

## 2025-01-16 RX ORDER — AZITHROMYCIN 250 MG/1
TABLET, FILM COATED ORAL
Qty: 6 TABLET | Refills: 0 | Status: SHIPPED | OUTPATIENT
Start: 2025-01-16

## 2025-01-16 NOTE — PROGRESS NOTES
Patient or patient representative verbalized consent for the use of Ambient Listening during the visit with  JUDIE Pete for chart documentation. 1/16/2025  10:29 EST    Chief Complaint   Patient presents with    Hypertension       Subjective      Patient ID: Nesha is a 64 y.o. female.     Chief Complaint   Patient presents with    Hypertension        Hypertension         History of Present Illness  The patient presents for evaluation of elevated blood pressure, cholesterol management, finger deformity, and sinus infection.    She has been experiencing elevated blood pressure readings, with a few exceptions of 129/80, 130/79, and 128/76. She attributes one reading of 150 to the intake of DayQuil. She recently retired and plans to incorporate walking into her daily routine with a friend. She is interested in implimenting lifestyle modifications to lower her blood pressure over the next few months.  Her kidney function, which had previously shown a slight decline 8 months ago, has fully recovered.    Erosive osteoarthritis -recent imaging of the hands showed erosive osteoarthritis with ulnar subluxation of the PIP of the fourth digit of the left hand resulting in deformity.  She has a scheduled appointment with a hand specialist at [facility] in February 2025 to evaluate this.    URI - She reports a recent onset of sore throat symptoms on Sunday night, which progressed to include coughing and rhinorrhea with greenish-yellow discharge. She also experienced dental pain and sinus discomfort. She has been expectorating small amounts of phlegm. She has been managing her symptoms with warm tea infused with honey and lemon. She has previously used fluticasone (Flonase) but currently does not have any at home.      SOCIAL HISTORY  She recently retired and plans to incorporate walking into her daily routine with a friend.    MEDICATIONS  Current: mesalamine  Past: fluticasone       The following portions of the  "patient's history were reviewed and updated as appropriate: allergies, current medications, past family history, past medical history, past social history, past surgical history, and problem list.      Current Outpatient Medications:     Cetirizine HCl (ZYRTEC ALLERGY PO), , Disp: , Rfl:     Cholecalciferol (VITAMIN D) 2000 units capsule, Take 1 capsule by mouth Daily., Disp: , Rfl:     mesalamine (LIALDA) 1.2 g EC tablet, Take 4 tablets by mouth Daily With Breakfast., Disp: 360 tablet, Rfl: 3    Multiple Vitamins-Minerals (multivitamin and minerals) liquid liquid, Take  by mouth Daily., Disp: , Rfl:     azithromycin (Zithromax Z-Justin) 250 MG tablet, Take 2 tablets by mouth on day 1, then 1 tablet daily on days 2-5, Disp: 6 tablet, Rfl: 0    fluticasone (FLONASE) 50 MCG/ACT nasal spray, Administer 2 sprays into the nostril(s) as directed by provider Daily. Indications: Stuffy Nose, Disp: 16 g, Rfl: 5        Results          Objective      /90   Pulse 82   Temp 97.3 °F (36.3 °C) (Infrared)   Ht 165.1 cm (65\")   Wt 85 kg (187 lb 4.8 oz)   SpO2 97%   BMI 31.17 kg/m²      Body mass index is 31.17 kg/m².           Physical Exam  Constitutional:       General: She is not in acute distress.     Appearance: Normal appearance.   HENT:      Nose:      Right Sinus: Frontal sinus tenderness present.      Left Sinus: Frontal sinus tenderness present.      Mouth/Throat:      Mouth: Mucous membranes are moist.      Pharynx: Oropharynx is clear. Posterior oropharyngeal erythema present. No oropharyngeal exudate.   Eyes:      Pupils: Pupils are equal, round, and reactive to light.   Neck:      Vascular: No carotid bruit or JVD.   Cardiovascular:      Rate and Rhythm: Normal rate and regular rhythm.      Pulses: Normal pulses.           Radial pulses are 2+ on the right side and 2+ on the left side.        Posterior tibial pulses are 2+ on the right side and 2+ on the left side.      Heart sounds: Normal heart sounds. No " murmur heard.     No friction rub.   Pulmonary:      Effort: Pulmonary effort is normal. No respiratory distress.      Breath sounds: Normal breath sounds. No wheezing or rales.   Musculoskeletal:      Left hand: Deformity (Subluxation of PIP fourth digit) present.      Cervical back: Neck supple.      Right lower leg: No edema.      Left lower leg: No edema.   Lymphadenopathy:      Cervical: No cervical adenopathy.   Neurological:      General: No focal deficit present.      Mental Status: She is alert.   Psychiatric:         Mood and Affect: Mood normal.         Behavior: Behavior normal.          Physical Exam          Assessment & Plan      Assessment & Plan       1. Primary hypertension  Her blood pressure readings have been consistently elevated, averaging between 130s to 150s over 80s, with only three measurements within the target range of less than 130/80. Her protein in urine is normal, which indicates that her kidney function has not been chronically affected by high blood pressure. She was advised to avoid NSAIDs such as ibuprofen and Aleve, which can contribute to hypertension. Dietary modifications were suggested, including reducing sodium intake, increasing potassium consumption, and following the DASH diet. Regular physical activity, specifically walking for 150 minutes per week, was recommended. Weight loss of 5 to 10 pounds was also encouraged. If her blood pressure remains above the target goal after 3 months of lifestyle modifications, further intervention will be considered.    2. Erosive osteoarthritis of both hands  She has a scheduled appointment with a hand specialist at [facility] in February 2025 to address a deformity in her fourth digit. The deformity is due to significant arthritis, not a recent injury.     3. Mixed hyperlipidemia  Her cholesterol levels have shown improvement, with a decrease from 261 to 247, and LDL cholesterol reduced from 157 to 146. However, her triglycerides  remain elevated at 250, above the desired level of less than 150. She was advised to reduce saturated fats, carbohydrates and sugar intake, and to choose healthier sources of carbohydrates. Lifestyle modifications, including a reduction in carbs and sugar, were recommended to help lower her A1c levels.  150 minutes of moderate intensity physical activity with 2 to 3 days/week of strength training was recommended.    4. Acute non-recurrent frontal sinusitis  5. Nasal congestion  She was advised to use Mucinex to thin her secretions, take steam showers, sleep with her head elevated, and drink warm tea with honey and lemon. Rest was also recommended. A prescription for azithromycin (Z-Justin) was provided, with instructions to take 2 pills on the first day, followed by 1 pill daily for the next 4 days. A prescription for Flonase (fluticasone) was also given, with instructions to administer 2 squirts in each nostril daily.  - azithromycin (Zithromax Z-Justin) 250 MG tablet; Take 2 tablets by mouth on day 1, then 1 tablet daily on days 2-5  Dispense: 6 tablet; Refill: 0  - fluticasone (FLONASE) 50 MCG/ACT nasal spray; Administer 2 sprays into the nostril(s) as directed by provider Daily. Indications: Stuffy Nose  Dispense: 16 g; Refill: 5       Return in about 3 months (around 4/16/2025) for BP follow-up.       JUDIE Pete           Note to patient: The 21st Century Cures Act makes medical notes like these available to patients in the interest of transparency. However, be advised this is a medical document. It is intended as peer to peer communication. It is written in medical language and may contain abbreviations or verbiage that are unfamiliar. It may appear blunt or direct. Medical documents are intended to carry relevant information, facts as evident, and the clinical opinion of the practitioner.

## 2025-03-19 ENCOUNTER — TELEPHONE (OUTPATIENT)
Dept: GASTROENTEROLOGY | Facility: CLINIC | Age: 65
End: 2025-03-19
Payer: MEDICARE

## 2025-03-19 DIAGNOSIS — K51.00 ULCERATIVE PANCOLITIS WITHOUT COMPLICATION: ICD-10-CM

## 2025-03-19 RX ORDER — MESALAMINE 1.2 G/1
4.8 TABLET, DELAYED RELEASE ORAL
Qty: 360 TABLET | Refills: 3 | Status: CANCELLED | OUTPATIENT
Start: 2025-03-19

## 2025-03-19 NOTE — TELEPHONE ENCOUNTER
mesalamine (LIALDA) 1.2 g EC tablet [[id number]] (Order [id number])     PT HAD TO CHANGE HER PHARM TO [facility]  [phone]    THEY REQUEST THE OFFICE CALL IN THE ORDER  THEY ARE REQUESTING 3 MTHS AT A TIME  CALL PT WITH ANY QUESTIONS    [phone]

## 2025-03-20 DIAGNOSIS — K51.00 ULCERATIVE PANCOLITIS WITHOUT COMPLICATION: ICD-10-CM

## 2025-03-20 RX ORDER — MESALAMINE 1.2 G/1
4.8 TABLET, DELAYED RELEASE ORAL
Qty: 360 TABLET | Refills: 1 | Status: SHIPPED | OUTPATIENT
Start: 2025-03-20

## 2025-03-25 ENCOUNTER — TELEPHONE (OUTPATIENT)
Dept: GASTROENTEROLOGY | Facility: CLINIC | Age: 65
End: 2025-03-25
Payer: MEDICARE

## 2025-03-28 NOTE — TELEPHONE ENCOUNTER
SHE SAID SHE RECEIVED NOTIFICATION THAT HE WAS DENIED  SHE WANTS TO KNOW IF IN THE PA IT WAS LISTED AS SHE IS ALLERGIC TO SULFA?  SHE DOESN'T KNOW WHAT ELSE TO DO  SAYS SHE ONLY HAS AROUND A MONTH LEFT OF THE MED    mesalamine (LIALDA) 1.2 g EC tablet [[id number]] (Order [id number])

## 2025-03-28 NOTE — TELEPHONE ENCOUNTER
Per provider appeal drafted: intolerant to sulfa supp & Enema inadequate. Unable to be complaint with Balsalazide dosing.    Appeal pending provider review

## 2025-04-03 ENCOUNTER — TELEPHONE (OUTPATIENT)
Dept: FAMILY MEDICINE CLINIC | Facility: CLINIC | Age: 65
End: 2025-04-03

## 2025-04-03 ENCOUNTER — HOSPITAL ENCOUNTER (OUTPATIENT)
Dept: CT IMAGING | Facility: HOSPITAL | Age: 65
Discharge: HOME | End: 2025-04-03
Payer: MEDICARE

## 2025-04-03 ENCOUNTER — OFFICE VISIT (OUTPATIENT)
Dept: FAMILY MEDICINE CLINIC | Facility: CLINIC | Age: 65
End: 2025-04-03
Payer: MEDICARE

## 2025-04-03 VITALS
SYSTOLIC BLOOD PRESSURE: 138 MMHG | HEIGHT: 65 IN | WEIGHT: 184 LBS | OXYGEN SATURATION: 98 % | DIASTOLIC BLOOD PRESSURE: 88 MMHG | BODY MASS INDEX: 30.66 KG/M2 | HEART RATE: 89 BPM | TEMPERATURE: 97.5 F

## 2025-04-03 DIAGNOSIS — R10.30 LOWER ABDOMINAL PAIN: Primary | ICD-10-CM

## 2025-04-03 DIAGNOSIS — K51.00 ULCERATIVE PANCOLITIS WITHOUT COMPLICATION: ICD-10-CM

## 2025-04-03 DIAGNOSIS — R10.32 LEFT LOWER QUADRANT ABDOMINAL PAIN: ICD-10-CM

## 2025-04-03 DIAGNOSIS — K57.30 DIVERTICULOSIS OF LARGE INTESTINE WITHOUT HEMORRHAGE: ICD-10-CM

## 2025-04-03 LAB
BILIRUB BLD-MCNC: NEGATIVE MG/DL
CLARITY, POC: CLEAR
COLOR UR: YELLOW
GLUCOSE UR STRIP-MCNC: NEGATIVE MG/DL
KETONES UR QL: NEGATIVE
LEUKOCYTE EST, POC: NEGATIVE
NITRITE UR-MCNC: NEGATIVE MG/ML
PH UR: 5 [PH] (ref 5–8)
PROT UR STRIP-MCNC: NEGATIVE MG/DL
RBC # UR STRIP: NEGATIVE /UL
SP GR UR: 1 (ref 1–1.03)
UROBILINOGEN UR QL: NORMAL

## 2025-04-03 PROCEDURE — 74176 CT ABD & PELVIS W/O CONTRAST: CPT

## 2025-04-03 PROCEDURE — 25510000002 DIATRIZOATE MEGLUMINE & SODIUM PER 1 ML

## 2025-04-03 RX ORDER — DIATRIZOATE MEGLUMINE AND DIATRIZOATE SODIUM 660; 100 MG/ML; MG/ML
30 SOLUTION ORAL; RECTAL ONCE
Status: COMPLETED | OUTPATIENT
Start: 2025-04-03 | End: 2025-04-03

## 2025-04-03 RX ADMIN — DIATRIZOATE MEGLUMINE AND DIATRIZOATE SODIUM 30 ML: 660; 100 LIQUID ORAL; RECTAL at 16:09

## 2025-04-03 NOTE — PROGRESS NOTES
Patient or patient representative verbalized consent for the use of Ambient Listening during the visit with  JUDIE Pete for chart documentation. 4/3/2025  11:37 EDT    Chief Complaint   Patient presents with    Abdominal Pain     Lower, worse with urination, started Sunday, has increased water intake, tried cranberry juice       Subjective      Patient ID: Nesha is a 64 y.o. female.     Chief Complaint   Patient presents with    Abdominal Pain     Lower, worse with urination, started Sunday, has increased water intake, tried cranberry juice        Abdominal Pain         History of Present Illness  The patient presents for evaluation of left lower abdominal pain.    She reports experiencing abdominal discomfort since Sunday, which is exacerbated by sitting or leaning forward. The pain is localized to the left lower quadrant. She does not experience dysuria or urinary urgency but notes mild back pain. She has no history of renal calculi. Despite the pain, she was able to engage in physical activities such as walking and playing pickleball on Monday, although she felt very unwell afterwards. She also experienced generalized body aches but felt better upon waking up on Tuesday. She has a known history of ulcerative colitis and diverticulosis but has never had an episode of diverticulitis. Her bowel movements have been regular, with soft, brown stools without any blood or mucus. She does not have difficulty passing stools and reports no nausea or vomiting. She has not observed any lymphadenopathy. Her hydration status is normal, although she has not consumed any food today and does not feel hungry.  She denies fevers or chills.  She is currently taking mesalamine 4.8 g daily with breakfast for her ulcerative colitis.    She brought in her blood pressure log for follow-up of hypertension, but in the last 4 days, her blood pressures have been elevated in the 130s/80s.  She does endorse that she has had  "significant abdominal pain for all 4 of those days and is wondering if this is related to pain.       The following portions of the patient's history were reviewed and updated as appropriate: allergies, current medications, past family history, past medical history, past social history, past surgical history, and problem list.      Current Outpatient Medications:     Cetirizine HCl (ZYRTEC ALLERGY PO), , Disp: , Rfl:     Cholecalciferol (VITAMIN D) 2000 units capsule, Take 1 capsule by mouth Daily., Disp: , Rfl:     mesalamine (LIALDA) 1.2 g EC tablet, Take 4 tablets by mouth Daily With Breakfast., Disp: 360 tablet, Rfl: 1    Multiple Vitamins-Minerals (multivitamin and minerals) liquid liquid, Take  by mouth Daily., Disp: , Rfl:     fluticasone (FLONASE) 50 MCG/ACT nasal spray, Administer 2 sprays into the nostril(s) as directed by provider Daily. Indications: Stuffy Nose (Patient not taking: Reported on 4/3/2025), Disp: 16 g, Rfl: 5        Results  Laboratory Studies  Urine test is normal.        Objective      /88 (BP Location: Left arm, Patient Position: Sitting, Cuff Size: Large Adult)   Pulse 89   Temp 97.5 °F (36.4 °C) (Infrared)   Ht 165.1 cm (65\")   Wt 83.5 kg (184 lb)   SpO2 98%   BMI 30.62 kg/m²      Body mass index is 30.62 kg/m².           Physical Exam  Constitutional:       General: She is not in acute distress.     Appearance: Normal appearance.   Eyes:      Pupils: Pupils are equal, round, and reactive to light.   Cardiovascular:      Rate and Rhythm: Normal rate and regular rhythm.      Pulses: Normal pulses.      Heart sounds: Normal heart sounds. No murmur heard.     No friction rub.   Pulmonary:      Effort: Pulmonary effort is normal. No respiratory distress.      Breath sounds: Normal breath sounds. No wheezing or rales.   Abdominal:      General: Abdomen is protuberant. Bowel sounds are increased.      Palpations: Abdomen is soft.      Tenderness: There is abdominal tenderness in " the right lower quadrant and left lower quadrant. There is guarding. There is no right CVA tenderness, left CVA tenderness or rebound. Negative signs include Rovsing's sign and McBurney's sign.      Comments: There is tenderness in the left lower quadrant when the right lower quadrant is palpated, but there is no tenderness in the right lower quadrant when the left lower quadrant is palpated.  Negative for rebound tenderness.  Abdomen is soft and nonrigid.   Musculoskeletal:      Cervical back: Neck supple.   Neurological:      General: No focal deficit present.      Mental Status: She is alert.   Psychiatric:         Mood and Affect: Mood normal.         Behavior: Behavior normal.          Physical Exam  Lungs were auscultated.  There is tenderness in the left side of the abdomen. Bowel sounds are increased.    Vital Signs  Blood pressure is 130s/80s.      Results for orders placed or performed in visit on 04/03/25   POCT urinalysis dipstick, manual    Collection Time: 04/03/25 11:09 AM    Specimen: Urine   Result Value Ref Range    Color Yellow Yellow, Straw, Dark Yellow, Bonnie    Clarity, UA Clear Clear    Glucose, UA Negative Negative mg/dL    Bilirubin Negative Negative    Ketones, UA Negative Negative    Specific Gravity  1.005 1.005 - 1.030    Blood, UA Negative Negative    pH, Urine 5.0 5.0 - 8.0    Protein, POC Negative Negative mg/dL    Urobilinogen, UA 0.2 E.U./dL Normal, 0.2 E.U./dL    Leukocytes Negative Negative    Nitrite, UA Negative Negative       Assessment & Plan        Assessment & Plan       1. Lower abdominal pain  2. Left lower quadrant abdominal pain  3. Ulcerative pancolitis without complication  4. Diverticulosis of large intestine without hemorrhage  New undiagnosed problem with uncertain prognosis. The urine dipstick test returned normal results, indicating no hematuria or bacteria. Given her history of diverticulosis and UC, it is plausible that the current symptoms are related to an  acute bowel condition. The possibility of a renal calculus was considered but deemed unlikely due to the absence of blood in the urine. The elevated blood pressure readings could be a consequence of her abdominal discomfort. A CT scan of the abdomen will be ordered STAT to rule out diverticulitis and abscess or other complication from UC. Additionally, laboratory tests will be conducted to investigate the cause of the abdominal pain. A urine culture will also be obtained as a precautionary measure, although a urinary tract infection is not suspected.  She may be referred back to GI based on results.  - POCT urinalysis dipstick, manual  - CT Abdomen Pelvis Without Contrast; Future  - C-reactive protein  - Sedimentation Rate  - CBC & Differential  - Comprehensive Metabolic Panel  - Urine Culture - Urine, Urine, Clean Catch       Return for Next scheduled follow up.       JUDIE Pete           Note to patient: The 21st Century Cures Act makes medical notes like these available to patients in the interest of transparency. However, be advised this is a medical document. It is intended as peer to peer communication. It is written in medical language and may contain abbreviations or verbiage that are unfamiliar. It may appear blunt or direct. Medical documents are intended to carry relevant information, facts as evident, and the clinical opinion of the practitioner.

## 2025-04-03 NOTE — TELEPHONE ENCOUNTER
Spoke with patient. Patient understands and has no further questions.  Patient canceled appt on 4/7/25 until we get the CT scan    ----- Message from Alisson Hayes sent at 4/3/2025 12:27 PM EDT -----  Regarding: CT scan    Will you please let Nesha know that her insurance requires prior authorization before we can do the CT of her abdomen.  They said in their review that it can take up to 14 days, however I hope that that is not the case and we get this done in the next day or so.  If your pain gets worse at all or you develop any fevers, blood in your stool, or more severe abdominal pain, I want you to go to the ER rather than waiting for this to be done outpatient.  Otherwise, we are working on getting it approved and we will get you scheduled as soon as we can.

## 2025-04-05 LAB
ALBUMIN SERPL-MCNC: 4.3 G/DL (ref 3.9–4.9)
ALP SERPL-CCNC: 73 IU/L (ref 44–121)
ALT SERPL-CCNC: 23 IU/L (ref 0–32)
AST SERPL-CCNC: 24 IU/L (ref 0–40)
BACTERIA UR CULT: NORMAL
BACTERIA UR CULT: NORMAL
BASOPHILS # BLD AUTO: 0.1 X10E3/UL (ref 0–0.2)
BASOPHILS NFR BLD AUTO: 1 %
BILIRUB SERPL-MCNC: 0.6 MG/DL (ref 0–1.2)
BUN SERPL-MCNC: 12 MG/DL (ref 8–27)
BUN/CREAT SERPL: 15 (ref 12–28)
CALCIUM SERPL-MCNC: 9.4 MG/DL (ref 8.7–10.3)
CHLORIDE SERPL-SCNC: 102 MMOL/L (ref 96–106)
CO2 SERPL-SCNC: 23 MMOL/L (ref 20–29)
CREAT SERPL-MCNC: 0.79 MG/DL (ref 0.57–1)
CRP SERPL-MCNC: 32 MG/L (ref 0–10)
EGFRCR SERPLBLD CKD-EPI 2021: 83 ML/MIN/1.73
EOSINOPHIL # BLD AUTO: 0.2 X10E3/UL (ref 0–0.4)
EOSINOPHIL NFR BLD AUTO: 3 %
ERYTHROCYTE [DISTWIDTH] IN BLOOD BY AUTOMATED COUNT: 12.3 % (ref 11.7–15.4)
ERYTHROCYTE [SEDIMENTATION RATE] IN BLOOD BY WESTERGREN METHOD: 24 MM/HR (ref 0–40)
GLOBULIN SER CALC-MCNC: 2.2 G/DL (ref 1.5–4.5)
GLUCOSE SERPL-MCNC: 84 MG/DL (ref 70–99)
HCT VFR BLD AUTO: 39.4 % (ref 34–46.6)
HGB BLD-MCNC: 13.3 G/DL (ref 11.1–15.9)
IMM GRANULOCYTES # BLD AUTO: 0 X10E3/UL (ref 0–0.1)
IMM GRANULOCYTES NFR BLD AUTO: 0 %
LYMPHOCYTES # BLD AUTO: 1.2 X10E3/UL (ref 0.7–3.1)
LYMPHOCYTES NFR BLD AUTO: 21 %
MCH RBC QN AUTO: 32 PG (ref 26.6–33)
MCHC RBC AUTO-ENTMCNC: 33.8 G/DL (ref 31.5–35.7)
MCV RBC AUTO: 95 FL (ref 79–97)
MONOCYTES # BLD AUTO: 0.5 X10E3/UL (ref 0.1–0.9)
MONOCYTES NFR BLD AUTO: 8 %
NEUTROPHILS # BLD AUTO: 4.1 X10E3/UL (ref 1.4–7)
NEUTROPHILS NFR BLD AUTO: 67 %
PLATELET # BLD AUTO: 256 X10E3/UL (ref 150–450)
POTASSIUM SERPL-SCNC: 4.1 MMOL/L (ref 3.5–5.2)
PROT SERPL-MCNC: 6.5 G/DL (ref 6–8.5)
RBC # BLD AUTO: 4.16 X10E6/UL (ref 3.77–5.28)
SODIUM SERPL-SCNC: 139 MMOL/L (ref 134–144)
WBC # BLD AUTO: 6 X10E3/UL (ref 3.4–10.8)

## 2025-04-10 ENCOUNTER — TRANSCRIBE ORDERS (OUTPATIENT)
Dept: ADMINISTRATIVE | Facility: HOSPITAL | Age: 65
End: 2025-04-10
Payer: MEDICARE

## 2025-04-10 DIAGNOSIS — Z12.31 SCREENING MAMMOGRAM, ENCOUNTER FOR: Primary | ICD-10-CM

## 2025-04-29 ENCOUNTER — HOSPITAL ENCOUNTER (OUTPATIENT)
Dept: MAMMOGRAPHY | Facility: HOSPITAL | Age: 65
Discharge: HOME | End: 2025-04-29
Payer: MEDICARE

## 2025-04-29 DIAGNOSIS — Z12.31 SCREENING MAMMOGRAM, ENCOUNTER FOR: ICD-10-CM

## 2025-04-29 PROCEDURE — 77063 BREAST TOMOSYNTHESIS BI: CPT | Performed by: RADIOLOGY

## 2025-04-29 PROCEDURE — 77067 SCR MAMMO BI INCL CAD: CPT | Performed by: RADIOLOGY

## 2025-04-29 PROCEDURE — 77063 BREAST TOMOSYNTHESIS BI: CPT

## 2025-04-29 PROCEDURE — 77067 SCR MAMMO BI INCL CAD: CPT

## 2025-05-07 ENCOUNTER — OFFICE VISIT (OUTPATIENT)
Dept: FAMILY MEDICINE CLINIC | Facility: CLINIC | Age: 65
End: 2025-05-07
Payer: MEDICARE

## 2025-05-07 VITALS
TEMPERATURE: 97.3 F | WEIGHT: 178.6 LBS | HEIGHT: 65 IN | DIASTOLIC BLOOD PRESSURE: 88 MMHG | BODY MASS INDEX: 29.76 KG/M2 | OXYGEN SATURATION: 94 % | HEART RATE: 92 BPM | SYSTOLIC BLOOD PRESSURE: 120 MMHG

## 2025-05-07 DIAGNOSIS — J98.11 ATELECTASIS OF BOTH LUNGS: ICD-10-CM

## 2025-05-07 DIAGNOSIS — K51.00 ULCERATIVE PANCOLITIS WITHOUT COMPLICATION: ICD-10-CM

## 2025-05-07 DIAGNOSIS — Z23 NEED FOR VACCINATION: ICD-10-CM

## 2025-05-07 DIAGNOSIS — N18.2 CKD (CHRONIC KIDNEY DISEASE) STAGE 2, GFR 60-89 ML/MIN: ICD-10-CM

## 2025-05-07 DIAGNOSIS — R94.31 ABNORMAL EKG: ICD-10-CM

## 2025-05-07 DIAGNOSIS — E78.2 MIXED HYPERLIPIDEMIA: Chronic | ICD-10-CM

## 2025-05-07 DIAGNOSIS — K57.30 DIVERTICULOSIS OF LARGE INTESTINE WITHOUT HEMORRHAGE: ICD-10-CM

## 2025-05-07 DIAGNOSIS — J30.89 NON-SEASONAL ALLERGIC RHINITIS DUE TO OTHER ALLERGIC TRIGGER: ICD-10-CM

## 2025-05-07 DIAGNOSIS — I15.9 SECONDARY HYPERTENSION: ICD-10-CM

## 2025-05-07 DIAGNOSIS — Z00.00 MEDICARE ANNUAL WELLNESS VISIT, INITIAL: Primary | ICD-10-CM

## 2025-05-07 DIAGNOSIS — R06.09 DYSPNEA ON EXERTION: ICD-10-CM

## 2025-05-07 DIAGNOSIS — E55.9 VITAMIN D INSUFFICIENCY: ICD-10-CM

## 2025-05-07 NOTE — PROGRESS NOTES
Subjective   The ABCs of the Annual Wellness Visit  Medicare Wellness Visit      Nesha Kee is a 64 y.o. patient who presents for a Medicare Wellness Visit.    The following portions of the patient's history were reviewed and   updated as appropriate: allergies, current medications, past family history, past medical history, past social history, past surgical history, and problem list.    Compared to one year ago, the patient's physical   health is the same.  Compared to one year ago, the patient's mental   health is the same.    Recent Hospitalizations:  She was not admitted to the hospital during the last year.     Current Medical Providers:  Patient Care Team:  Alisson Hayes APRN as PCP - General (Family Medicine)    Outpatient Medications Prior to Visit   Medication Sig Dispense Refill    Cetirizine HCl (ZYRTEC ALLERGY PO)       Cholecalciferol (VITAMIN D) 2000 units capsule Take 1 capsule by mouth Daily.      mesalamine (LIALDA) 1.2 g EC tablet Take 4 tablets by mouth Daily With Breakfast. 360 tablet 1    Multiple Vitamins-Minerals (multivitamin and minerals) liquid liquid Take  by mouth Daily.      fluticasone (FLONASE) 50 MCG/ACT nasal spray Administer 2 sprays into the nostril(s) as directed by provider Daily. Indications: Stuffy Nose (Patient not taking: Reported on 5/7/2025) 16 g 5     No facility-administered medications prior to visit.     No opioid medication identified on active medication list. I have reviewed chart for other potential  high risk medication/s and harmful drug interactions in the elderly.      Aspirin is not on active medication list.  Aspirin use is not indicated based on review of current medical condition/s. Risk of harm outweighs potential benefits.  .    Patient Active Problem List   Diagnosis    Vaginal prolapse    Abnormal electrocardiogram    Diverticulosis of large intestine    Chronic thyroiditis    Cervical radiculopathy    Contact dermatitis due to poison ivy  "   Degeneration of intervertebral disc of cervical region    Depression    Basedow's disease    Groin strain    Hematuria    Mixed hyperlipidemia    Microscopic hematuria    Neck pain    Pain in pelvis    Shoulder pain    Ulcerative colitis    Urinary tract infection without hematuria    Hip pain    Encounter for annual physical exam    Right hip pain    Pap smear, as part of routine gynecological examination    Cystocele with rectocele    Decreased bladder capacity    Genuine stress incontinence, female    Midline cystocele    Mixed stress and urge urinary incontinence    Rectocele    Hypermobility of urethra    Urinary urgency    Vaginal vault prolapse    Vaginal vault prolapse after hysterectomy    Acute foot pain, right    Vitamin D insufficiency    Allergic rhinitis due to allergen    Flank pain    Menopause    Screening mammogram, encounter for    Obesity (BMI 30.0-34.9)    Borderline hypertension    Thumb tendonitis    Chronic fatigue    Encounter for screening for malignant neoplasm of colon    Personal history of colonic polyps    COVID-19 virus detected    Secondary hypertension    Primary osteoarthritis of left knee     Advance Care Planning Advance Directive is on file.  ACP discussion was held with the patient during this visit. Has one, needs to bring in a copy            Objective   Vitals:    05/07/25 0811   BP: 120/88   Pulse: 92   Temp: 97.3 °F (36.3 °C)   TempSrc: Infrared   SpO2: 94%   Weight: 81 kg (178 lb 9.6 oz)   Height: 165.1 cm (65\")   PainSc: 0-No pain       Estimated body mass index is 29.72 kg/m² as calculated from the following:    Height as of this encounter: 165.1 cm (65\").    Weight as of this encounter: 81 kg (178 lb 9.6 oz).    BMI is >= 25 and <30. (Overweight) The following options were offered after discussion;: weight loss educational material (shared in after visit summary), exercise counseling/recommendations, and nutrition counseling/recommendations        Gait and Balance " Evaluation:  Normal    Does the patient have evidence of cognitive impairment? No       ECG 12 Lead    Date/Time: 2025 12:49 PM  Performed by: Alisson Hayes APRN    Authorized by: Alisson Hayes APRN  Comparison: compared with previous ECG from 2021  Comparison to previous ECG: Ventricular conduction delay on previous EKG  Rhythm: sinus rhythm  BPM: 69  Conduction: incomplete right bundle branch block  QRS axis: normal  Other findings: non-specific ST-T wave changes    Clinical impression: abnormal EKG                                                                                                Health  Risk Assessment    Smoking Status:  Social History     Tobacco Use   Smoking Status Never    Passive exposure: Past   Smokeless Tobacco Never     Alcohol Consumption:  Social History     Substance and Sexual Activity   Alcohol Use Never       Fall Risk Screen  STEADI Fall Risk Assessment was completed, and patient is at LOW risk for falls.Assessment completed on:2025    Depression Screening   Little interest or pleasure in doing things? Not at all   Feeling down, depressed, or hopeless? Not at all   PHQ-2 Total Score 0      Health Habits and Functional and Cognitive Screenin/30/2025     9:52 AM   Functional & Cognitive Status   Do you have difficulty preparing food and eating? No   Do you have difficulty bathing yourself, getting dressed or grooming yourself? No   Do you have difficulty using the toilet? No   Do you have difficulty moving around from place to place? No   Do you have trouble with steps or getting out of a bed or a chair? No   Current Diet Well Balanced Diet   Dental Exam Other   Eye Exam Up to date   Exercise (times per week) 3 times per week   Current Exercises Include Pickleball;Walking   Do you need help using the phone?  No   Are you deaf or do you have serious difficulty hearing?  No   Do you need help to go to places out of walking distance? No   Do you need  help shopping? No   Do you need help preparing meals?  No   Do you need help with housework?  No   Do you need help with laundry? No   Do you need help taking your medications? No   Do you need help managing money? No   Do you ever drive or ride in a car without wearing a seat belt? No   Have you felt unusual stress, anger or loneliness in the last month? No   Who do you live with? Spouse   If you need help, do you have trouble finding someone available to you? No   Have you been bothered in the last four weeks by sexual problems? No   Do you have difficulty concentrating, remembering or making decisions? No   Visual Acuity:  Vision Screening    Right eye Left eye Both eyes   Without correction 20/25 20/25 20/15   With correction        Age-appropriate Screening Schedule:  Refer to the list below for future screening recommendations based on patient's age, sex and/or medical conditions. Orders for these recommended tests are listed in the plan section. The patient has been provided with a written plan.    Health Maintenance List  Health Maintenance   Topic Date Due    ZOSTER VACCINE (1 of 2) 12/31/2025 (Originally 9/28/2009)    COVID-19 Vaccine (1 - 2024-25 season) 01/02/2026 (Originally 9/1/2024)    Pneumococcal Vaccine 50+ (1 of 1 - PCV) 01/02/2026 (Originally 9/28/2009)    INFLUENZA VACCINE  07/01/2025    DXA SCAN  08/16/2025    LIPID PANEL  01/03/2026    ANNUAL WELLNESS VISIT  05/07/2026    COLORECTAL CANCER SCREENING  04/22/2027    MAMMOGRAM  04/29/2027    TDAP/TD VACCINES (3 - Td or Tdap) 05/07/2035    HEPATITIS C SCREENING  Completed                                                                                                                                                CMS Preventative Services Quick Reference  Risk Factors Identified During Encounter  Immunizations Discussed/Encouraged: Tdap  Dental Screening Recommended  Vision Screening Recommended    The above risks/problems have been discussed with  the patient.  Pertinent information has been shared with the patient in the After Visit Summary.  An After Visit Summary and PPPS were made available to the patient.    Follow Up:   Next Medicare Wellness visit to be scheduled in 1 year.         Additional E&M Note during same encounter follows:  Patient has additional, significant, and separately identifiable condition(s)/problem(s) that require work above and beyond the Medicare Wellness Visit     Chief Complaint  Welcome To Medicare    Subjective    HPI  Nesha is also being seen today for an annual adult preventative physical exam. chronic care management of hyperlipidemia, UC, and stage 2 CKD.       She reports no significant changes in her physical or mental health over the past year. Hospitalization was not required, except for diagnostic purposes. She is not currently on aspirin therapy. An advanced directive for living will is available but not on file. No issues with balance, walking, recent falls, or memory problems are reported. She is independent in activities of daily living such as cooking, dressing, and using the bathroom. A dental appointment is scheduled in 4 days, and an eye exam was completed within the past year. No hearing issues are reported. She is not a smoker.    Blood pressure logs indicate improvement since January, with current readings of 110s/70s on home log. She notes that walking has been beneficial for her blood pressure management.     In April, she presented with abdominal complaints. CRP was mildly elevated, but blood counts were normal, including white cell count. She was found to have diverticulitis on CT and managed the condition with hydration and bland low fiber diet. She reports that the pain has resolved and she has resumed a regular diet. She has UC, and mesalamine is taken regularly, and bowel movements are normal without abdominal pain, blood, or mucus in the stool. She has a follow-up appointment scheduled with GI in  "July.  Her GI doctor recommends DEXA scans every 3 years based on her ulcerative colitis.  We discussed USPSTF recommendations for DEXA scan every 2 years after the age of 65.    Lab review - Kidney function has improved from a GFR of 50.7 to 83 after discontinuation of NSAIDS. No protein was found in the most recent urine test, although trace protein had been present in the past. A1c was 5.5 in January, and cholesterol was 247 with an LDL of 146.     She manages allergies with Zyrtec and reports no current issues.     Dyspnea on exertion  - she experiences leg pain during walking and playing pickleball, which she attributes to reduced activity over the past 3 weeks due to job training. Core exercises and light weight training are part of her routine. Shortness of breath during walking and coughing when talking while walking have been present for some time. Frequent phlegm in her throat is also reported.  An incidental finding of bilateral lower lobe atelectasis was found on her imaging last month.  She has had abnormal EKGs in the past that showed ventricular conduction delay and has been seen by cardiology for this with workup including stress test in 2021.  She has not been seen since then.      SOCIAL HISTORY  She does not smoke.          Objective   Vital Signs:  /88   Pulse 92   Temp 97.3 °F (36.3 °C) (Infrared)   Ht 165.1 cm (65\")   Wt 81 kg (178 lb 9.6 oz)   SpO2 94%   BMI 29.72 kg/m²   Physical Exam      Mouth/Throat: Mucous membranes moist, no erythema, no exudate  Neck: Supple, no abnormalities  Respiratory: Clear to auscultation, no wheezing, rales or rhonchi  Cardiovascular: Regular rate and rhythm, no murmurs, rubs, or gallops  Gastrointestinal: Soft, no tenderness, no distention, no masses  Extremities: No edema, no cyanosis    The following data was reviewed by: JUDIE Pete on 05/07/2025:  Data reviewed : Radiologic studies Ct abd/pelivs, previous EKGs reviewed  Common labs  "         1/3/2025    08:22 4/3/2025    11:25   Common Labs   Glucose 100  84    BUN 18  12    Creatinine 0.89  0.79    Sodium 140  139    Potassium 4.5  4.1    Chloride 103  102    Calcium 9.9  9.4    Albumin 4.5  4.3    Total Bilirubin 0.6  0.6    Alkaline Phosphatase 68  73    AST (SGOT) 18  24    ALT (SGPT) 23  23    WBC 6.07  6.0    Hemoglobin 14.3  13.3    Hematocrit 43.1  39.4    Platelets 241  256    Total Cholesterol 247     Triglycerides 250     HDL Cholesterol 56     LDL Cholesterol  146     Hemoglobin A1C 5.50     Microalbumin, Urine 10.2       CMP          1/3/2025    08:22 4/3/2025    11:25   CMP   Glucose 100  84    BUN 18  12    Creatinine 0.89  0.79    EGFR 72.1  83    Sodium 140  139    Potassium 4.5  4.1    Chloride 103  102    Calcium 9.9  9.4    Total Protein 7.0  6.5    Albumin 4.5  4.3    Globulin 2.5  2.2    Total Bilirubin 0.6  0.6    Alkaline Phosphatase 68  73    AST (SGOT) 18  24    ALT (SGPT) 23  23    Albumin/Globulin Ratio 1.8     BUN/Creatinine Ratio 20.2  15      CBC w/diff          1/3/2025    08:22 4/3/2025    11:25   CBC w/Diff   WBC 6.07  6.0    RBC 4.57  4.16    Hemoglobin 14.3  13.3    Hematocrit 43.1  39.4    MCV 94.3  95    MCH 31.3  32.0    MCHC 33.2  33.8    RDW 12.3  12.3    Platelets 241  256    Neutrophil Rel %  67    Lymphocyte Rel %  21    Monocyte Rel %  8    Eosinophil Rel %  3    Basophil Rel %  1      Lipid Panel          1/3/2025    08:22   Lipid Panel   Total Cholesterol 247    Triglycerides 250    HDL Cholesterol 56    VLDL Cholesterol 45    LDL Cholesterol  146      TSH          1/3/2025    08:22   TSH   TSH 0.954      Most Recent A1C          1/3/2025    08:22   HGBA1C Most Recent   Hemoglobin A1C 5.50      Microalbumin          1/3/2025    08:22   Microalbumin   Microalbumin, Urine 10.2        Results  Labs   - CRP: 04/2025, Mildly elevated   - Blood counts: 04/2025, Normal   - Kidney function (GFR): Improved, GFR increased from 50.7 to 83   - Urinalysis:  2025, No protein in urine   - A1c: 2025, 5.5   - Cholesterol: 2025, 247 with an LDL of 146   - Vitamin D level: 2025, 56.1   - Thyroid function: 2025, Normal    Imaging   - CT scan: Lower lobe atelectasis and degenerative changes of the spine    Diagnostic Testing   - [id number], Bradycardia and possible right ventricular conduction delay           Assessment and Plan Additional age appropriate preventative wellness advice topics were discussed during today's preventative wellness exam(some topics already addressed during AWV portion of the note above):    Physical Activity: Advised cardiovascular activity 150 minutes per week as tolerated. (example brisk walk for 30 minutes, 5 days a week).     Nutrition: Discussed nutrition plan with patient. Information shared in after visit summary. Goal is for a well balanced diet to enhance overall health.     Healthy Weight: Discussed current and goal BMI with patient. Steps to attain this goal discussed. Information shared in after visit summary.            Diagnoses and all orders for this visit:      1. Medicare annual wellness visit, initial (Primary)  Her blood pressure readings have shown significant improvement since 2025, with current readings predominantly in the range of 110s over 70s. Her kidney function has also improved, as evidenced by an increase in GFR from 50.7 to 83. Urinalysis revealed no proteinuria, although trace protein was detected in previous samples. Hemoglobin A1c level was within the normal range at 5.5. Cholesterol levels have decreased from 261 to 247, with LDL levels reducing from 157 to 146. Vitamin D level was satisfactory at 56.1. Thyroid function, liver function, electrolytes, and blood counts were all within normal limits. Her last DEXA scan conducted in  indicated osteopenia. She is advised to continue her current regimen of vitamin D and calcium supplements. She is scheduled for colon cancer screening in .  She is recommended to receive the shingles vaccine at the pharmacy and the pneumonia booster at our clinic, but she declines this today. A referral to cardiology will be made for further evaluation of her abnormal EKG. She is advised to seek immediate medical attention at the ER if she experiences worsening shortness of breath, fatigue, or chest pain.    2. Abnormal EKG  EKG in office today is abnormal showing incomplete right bundle branch block.  She was previously evaluated by cardiology in 2021 for ventricular conduction delay.  She is due to follow-up with them and will likely receive further testing from them.    -     Cancel: Ambulatory Referral to Cardiology  -     Ambulatory Referral to Cardiology    3. Atelectasis of both lungs  4. Dyspnea on exertion  New undiagnosed problem with uncertain prognosis.  She reports shortness of breath with exertion and occasional coughing while walking.  CT of the abdomen and pelvis showed incidental finding of bibasilar atelectasis.  An incentive spirometer will be ordered for her to use at home to help with lung recruitment. If cardiology clears her, a referral to a pulmonologist may be considered.  -     Ambulatory Referral to Cardiology  -     ECG 12 Lead  -     Incentive Spirometry; Future    5. Need for vaccination  -     Tdap Vaccine Greater Than or Equal To 8yo IM    6. Mixed hyperlipidemia    7. CKD (chronic kidney disease) stage 2, GFR 60-89 ml/min  Chronic, improving.  GFR is now 83 off of NSAIDs.  Will continue to monitor kidney function.    8. Vitamin D insufficiency  She is advised to continue her current regimen of vitamin D and calcium supplements.    9. Secondary hypertension  Resolved.  Her blood pressure readings have improved significantly, now mostly in the range of 110s over 70s. She is advised to continue monitoring her blood pressure and to check it if she experiences symptoms such as headaches or lightheadedness.    10. Ulcerative pancolitis without  complication  11. Diverticulosis of large intestine without hemorrhage  She is currently taking mesalamine and reports no issues with bowel movements, stomach pain, or blood/mucus in her stool.    12. Non-seasonal allergic rhinitis due to other allergic trigger  She is currently managing her allergies with Zyrtec and reports doing okay.    Other orders  -     Cancel: ECG 12 Lead        Follow-up with Medicare Wellness visit in 1 year, or sooner based on cardiology recommendations.        I spent 32 minutes caring for Nesha on this date of service. This time includes time spent by me in the following activities:preparing for the visit, reviewing tests, obtaining and/or reviewing a separately obtained history, performing a medically appropriate examination and/or evaluation , counseling and educating the patient/family/caregiver, ordering medications, tests, or procedures, referring and communicating with other health care professionals , documenting information in the medical record, independently interpreting results and communicating that information with the patient/family/caregiver, and care coordination  Follow Up   Return in about 1 year (around 5/7/2026) for Medicare Wellness with fasting labs the week prior.  Patient was given instructions and counseling regarding her condition or for health maintenance advice. Please see specific information pulled into the AVS if appropriate.  Patient or patient representative verbalized consent for the use of Ambient Listening during the visit with  JUDIE Pete for chart documentation. 5/7/2025  10:07 EDT

## 2025-06-25 ENCOUNTER — OFFICE VISIT (OUTPATIENT)
Dept: CARDIOLOGY | Facility: CLINIC | Age: 65
End: 2025-06-25
Payer: MEDICARE

## 2025-06-25 VITALS
WEIGHT: 177.4 LBS | SYSTOLIC BLOOD PRESSURE: 122 MMHG | HEIGHT: 65 IN | OXYGEN SATURATION: 97 % | DIASTOLIC BLOOD PRESSURE: 88 MMHG | HEART RATE: 98 BPM | BODY MASS INDEX: 29.56 KG/M2

## 2025-06-25 DIAGNOSIS — R94.31 ABNORMAL EKG: ICD-10-CM

## 2025-06-25 DIAGNOSIS — R06.09 DYSPNEA ON EXERTION: ICD-10-CM

## 2025-06-25 DIAGNOSIS — E78.2 MIXED HYPERLIPIDEMIA: Primary | Chronic | ICD-10-CM

## 2025-06-25 PROCEDURE — 99204 OFFICE O/P NEW MOD 45 MIN: CPT | Performed by: STUDENT IN AN ORGANIZED HEALTH CARE EDUCATION/TRAINING PROGRAM

## 2025-06-25 PROCEDURE — 93000 ELECTROCARDIOGRAM COMPLETE: CPT | Performed by: STUDENT IN AN ORGANIZED HEALTH CARE EDUCATION/TRAINING PROGRAM

## 2025-06-25 RX ORDER — GUAIFENESIN 600 MG/1
1200 TABLET, EXTENDED RELEASE ORAL 2 TIMES DAILY
COMMUNITY

## 2025-06-25 RX ORDER — PSEUDOEPHEDRINE HCL 120 MG/1
120 TABLET, FILM COATED, EXTENDED RELEASE ORAL EVERY 12 HOURS
COMMUNITY

## 2025-06-25 NOTE — PROGRESS NOTES
[facility]    Subjective:     Encounter Date:06/25/25      Patient ID: Nesha Kee is a 64 y.o. female.    Chief Complaint:   Chief Complaint   Patient presents with    New patient      History of Present Illness    Nesha Kee is a 64 y.o. lady who presents for further evaluation of abnormal EKG.    In May she was having some upper respiratory sinus issues and had some shortness of breath with exertion.  She had an EKG obtained at her PCP which had an abnormal finding with an incomplete right bundle branch block and nonspecific ST segment changes.  She was referred to cardiology for further evaluation.    Interviewing her EKG these findings today back to 2018.  In 2021 she did see Dr. Tolbert for shortness of breath in setting of the abnormal EKG and nuclear perfusion stress test was normal.    She otherwise reports she feels fine today.  Respiratory issues are getting better now that her URI symptoms are improving.  She has no chest pain or significant shortness of breath.    I directly reviewed and interpreted the patient's CT of the abdomen pelvis obtained from April 2025 which was noncontrasted due to the previous history of CT contrast reaction.  The cardiac silhouette is well-defined in the CT scan and I do not see any significant coronary artery calcium.  There is perhaps specks of coronary calcium in the RCA and LAD distribution but nothing significant.    Previous Cardiac Testing:  Stress test, myocardial perfusion.  2021.Hyperdynamic EF.  No ischemia.    The following portions of the patient's history were reviewed and updated as appropriate: allergies, current medications, past family history, past medical history, past social history, past surgical history and problem list.    Past Medical History:   Diagnosis Date    Abnormal ECG     Allergic     Anxiety     Arthritis of neck     Colon polyp     Diverticulosis 4/2024    Headache     Hip arthrosis     Hx of ulcer disease      "Hyperlipidemia     Hypertension     Hyperthyroidism 2008    In remission for several years    Injury of back Mert 1993    Discectomy    Knee swelling     Menstrual problem     Pneumonia     2 times did not keep track of 5he dates    Thyroid disease     Ulcerative colitis 1975       Past Surgical History:   Procedure Laterality Date    BUNIONECTOMY      CHOLECYSTECTOMY  12/10    COLONOSCOPY N/A 12/20/2021    Procedure: COLONOSCOPY WITH BIOPSIES;  Surgeon: Jonathan Devine MD;  Location: [facility] OR;  Service: Gastroenterology;  Laterality: N/A;  DIVERTICULOSIS      COLONOSCOPY N/A 04/22/2024    Procedure: COLONOSCOPY WITH BIOPSY;  Surgeon: Jonathan Devine MD;  Location: [facility] OR;  Service: Gastroenterology;  Laterality: N/A;  Diverticulosis; Ulcerative colitis; Right colon bx; Transverse colon bx; Sigmoid bx; Rectal bx    CYSTOCELE REPAIR      EYE PTOSIS REPAIR Bilateral 08/02/2021    HERNIA REPAIR      HYSTERECTOMY      INGUINAL HERNIA REPAIR  12/2010    LUMBAR DISCECTOMY  1993           ECG 12 Lead    Date/Time: 6/25/2025 12:07 PM  Performed by: Gordy Dutton MD    Authorized by: Gordy Dutton MD  Comparison: compared with previous ECG from 5/7/2025  Similar to previous ECG  Rhythm: sinus rhythm  Rate: normal  Conduction: incomplete right bundle branch block  Other findings: non-specific ST-T wave changes and right atrial abnormality    Clinical impression: abnormal EKG  Comments: Probable right atrial enlargement with incomplete right bundle branch block.  Unchanged from 2018 as well.             Objective:     Vitals:    06/25/25 1128   BP: 122/88   Pulse: 98   SpO2: 97%   Weight: 80.5 kg (177 lb 6.4 oz)   Height: 165.1 cm (65\")         Constitutional:       Appearance: Healthy appearance. Not in distress.   Neck:      Vascular: JVD normal.   Pulmonary:      Effort: Pulmonary effort is normal.      Breath sounds: Normal breath sounds.   Cardiovascular:      PMI at left midclavicular line. " Normal rate. Regular rhythm. Normal S2.       Murmurs: There is no murmur.      Comments: No murmurs appreciated.  Benign cardiovascular exam.  Pulses:     Intact distal pulses.   Edema:     Peripheral edema absent.   Skin:     General: Skin is warm and dry.   Neurological:      General: No focal deficit present.      Mental Status: Alert, oriented to person, place, and time and oriented to person, place and time.   Psychiatric:         Mood and Affect: Mood and affect normal.         Lab Review:     Lipid Panel          1/3/2025    08:22   Lipid Panel   Total Cholesterol 247    Triglycerides 250    HDL Cholesterol 56    VLDL Cholesterol 45    LDL Cholesterol  146      BUN   Date Value Ref Range Status   04/03/2025 12 8 - 27 mg/dL Final   08/03/2022 11 8 - 23 mg/dL Final   06/28/2018 11 7 - 20 mg/dL Final     Creatinine   Date Value Ref Range Status   04/03/2025 0.79 0.57 - 1.00 mg/dL Final   08/03/2022 0.88 0.57 - 1.00 mg/dL Final   06/28/2018 0.9 0.7 - 1.5 mg/dL Final     Potassium   Date Value Ref Range Status   04/03/2025 4.1 3.5 - 5.2 mmol/L Final   08/03/2022 4.1 3.5 - 5.2 mmol/L Final   06/28/2018 3.9 3.5 - 5.1 mmol/L Final     ALT (SGPT)   Date Value Ref Range Status   04/03/2025 23 0 - 32 IU/L Final   08/03/2022 25 1 - 33 U/L Final   06/15/2018 32 13 - 69 U/L Final     AST (SGOT)   Date Value Ref Range Status   04/03/2025 24 0 - 40 IU/L Final   08/03/2022 22 1 - 32 U/L Final   06/15/2018 26 15 - 46 U/L Final         Performed        Assessment:          Diagnosis Plan   1. Mixed hyperlipidemia               Plan:         Abnormal EKG: Dates back to 2018 at least.  Nonspecific ST segment changes with possible right atrial enlargement and incomplete right bundle branch block.  Check echo per below  Otherwise, this is likely benign normal variant and no other testing is required.  There are no new findings with his EKG compared to previous.  Dyspnea on exertion: Mild, intermittent.  She is been checked for  this with a stress test in 2021 which was unremarkable for ischemia.  Her CT scan does not have any significant coronary artery calcium to her I would worry about cardiac ischemia because of EKG abnormality or any of her dyspnea symptoms.  I do not think she meets guideline recommendations for ischemic testing at this time.  Will check an echocardiogram.  The EKG could suggest right atrial enlargement so this will assess her for any pulmonary hypertension.  She does report that she has some sisters with CHF.  She does not recall any diagnosis of cardiac amyloidosis or anything like that that she knows of.     Thank you for allowing me to participate in the care of Nesha Kee. Feel free to contact me directly with any further questions or concerns.    RTC as needed after the echo.    Gordy Dutton MD  [facility]  06/25/25  11:45 EDT       Current Outpatient Medications:     Cetirizine HCl (ZYRTEC ALLERGY PO), , Disp: , Rfl:     Cholecalciferol (VITAMIN D) 2000 units capsule, Take 1 capsule by mouth Daily., Disp: , Rfl:     guaiFENesin (MUCINEX) 600 MG 12 hr tablet, Take 2 tablets by mouth 2 (Two) Times a Day., Disp: , Rfl:     mesalamine (LIALDA) 1.2 g EC tablet, Take 4 tablets by mouth Daily With Breakfast., Disp: 360 tablet, Rfl: 1    Multiple Vitamins-Minerals (multivitamin and minerals) liquid liquid, Take  by mouth Daily., Disp: , Rfl:     pseudoephedrine (SUDAFED) 120 MG 12 hr tablet, Take 1 tablet by mouth Every 12 (Twelve) Hours., Disp: , Rfl:     fluticasone (FLONASE) 50 MCG/ACT nasal spray, Administer 2 sprays into the nostril(s) as directed by provider Daily. Indications: Stuffy Nose (Patient not taking: Reported on 4/3/2025), Disp: 16 g, Rfl: 5         No follow-ups on file.      Part of this note may be an electronic transcription/translation of spoken language to printed text using the Dragon Dictation System.

## 2025-07-07 DIAGNOSIS — K51.00 ULCERATIVE PANCOLITIS WITHOUT COMPLICATION: ICD-10-CM

## 2025-07-07 RX ORDER — MESALAMINE 1.2 G/1
TABLET, DELAYED RELEASE ORAL
Qty: 120 TABLET | Refills: 0 | Status: SHIPPED | OUTPATIENT
Start: 2025-07-07

## 2025-07-08 ENCOUNTER — HOSPITAL ENCOUNTER (OUTPATIENT)
Dept: CARDIOLOGY | Facility: HOSPITAL | Age: 65
Discharge: HOME | End: 2025-07-08
Admitting: STUDENT IN AN ORGANIZED HEALTH CARE EDUCATION/TRAINING PROGRAM
Payer: MEDICARE

## 2025-07-08 VITALS
DIASTOLIC BLOOD PRESSURE: 77 MMHG | SYSTOLIC BLOOD PRESSURE: 144 MMHG | HEIGHT: 65 IN | HEART RATE: 62 BPM | BODY MASS INDEX: 29.49 KG/M2 | WEIGHT: 177 LBS

## 2025-07-08 DIAGNOSIS — R94.31 ABNORMAL EKG: ICD-10-CM

## 2025-07-08 DIAGNOSIS — R06.09 DYSPNEA ON EXERTION: ICD-10-CM

## 2025-07-08 LAB
AORTIC ARCH: 2 CM
AORTIC DIMENSIONLESS INDEX: 0.53 (DI)
ASCENDING AORTA: 3.3 CM
AV MEAN PRESS GRAD SYS DOP V1V2: 2.7 MMHG
AV VMAX SYS DOP: 96.5 CM/SEC
BH CV ECHO MEAS - ACS: 2.24 CM
BH CV ECHO MEAS - AO MAX PG: 3.7 MMHG
BH CV ECHO MEAS - AO ROOT DIAM: 3.5 CM
BH CV ECHO MEAS - AO V2 VTI: 26.3 CM
BH CV ECHO MEAS - AVA(I,D): 1.61 CM2
BH CV ECHO MEAS - EDV(CUBED): 48.3 ML
BH CV ECHO MEAS - EDV(MOD-SP2): 63 ML
BH CV ECHO MEAS - EDV(MOD-SP4): 96 ML
BH CV ECHO MEAS - EF(MOD-SP2): 63.5 %
BH CV ECHO MEAS - EF(MOD-SP4): 65.6 %
BH CV ECHO MEAS - ESV(CUBED): 15.4 ML
BH CV ECHO MEAS - ESV(MOD-SP2): 23 ML
BH CV ECHO MEAS - ESV(MOD-SP4): 33 ML
BH CV ECHO MEAS - FS: 31.7 %
BH CV ECHO MEAS - IVS/LVPW: 1.08 CM
BH CV ECHO MEAS - IVSD: 1.22 CM
BH CV ECHO MEAS - LA DIMENSION: 3.9 CM
BH CV ECHO MEAS - LAT PEAK E' VEL: 7.2 CM/SEC
BH CV ECHO MEAS - LV DIASTOLIC VOL/BSA (35-75): 51.1 CM2
BH CV ECHO MEAS - LV MASS(C)D: 140 GRAMS
BH CV ECHO MEAS - LV MAX PG: 1.48 MMHG
BH CV ECHO MEAS - LV MEAN PG: 0.72 MMHG
BH CV ECHO MEAS - LV SYSTOLIC VOL/BSA (12-30): 17.6 CM2
BH CV ECHO MEAS - LV V1 MAX: 60.8 CM/SEC
BH CV ECHO MEAS - LV V1 VTI: 13.9 CM
BH CV ECHO MEAS - LVIDD: 3.6 CM
BH CV ECHO MEAS - LVIDS: 2.49 CM
BH CV ECHO MEAS - LVOT AREA: 3.1 CM2
BH CV ECHO MEAS - LVOT DIAM: 1.97 CM
BH CV ECHO MEAS - LVPWD: 1.14 CM
BH CV ECHO MEAS - MED PEAK E' VEL: 7.6 CM/SEC
BH CV ECHO MEAS - MV A DUR: 0.15 SEC
BH CV ECHO MEAS - MV A MAX VEL: 48.2 CM/SEC
BH CV ECHO MEAS - MV DEC SLOPE: 294.6 CM/SEC2
BH CV ECHO MEAS - MV DEC TIME: 202 SEC
BH CV ECHO MEAS - MV E MAX VEL: 59.7 CM/SEC
BH CV ECHO MEAS - MV E/A: 1.24
BH CV ECHO MEAS - MV MAX PG: 2.37 MMHG
BH CV ECHO MEAS - MV MEAN PG: 0.88 MMHG
BH CV ECHO MEAS - MV P1/2T: 69.4 MSEC
BH CV ECHO MEAS - MV V2 VTI: 21.4 CM
BH CV ECHO MEAS - MVA(P1/2T): 3.2 CM2
BH CV ECHO MEAS - MVA(VTI): 1.98 CM2
BH CV ECHO MEAS - PA ACC TIME: 0.19 SEC
BH CV ECHO MEAS - PA V2 MAX: 72.2 CM/SEC
BH CV ECHO MEAS - PULM A REVS DUR: 0.12 SEC
BH CV ECHO MEAS - PULM A REVS VEL: 28.7 CM/SEC
BH CV ECHO MEAS - PULM DIAS VEL: 43.7 CM/SEC
BH CV ECHO MEAS - PULM S/D: 1.22
BH CV ECHO MEAS - PULM SYS VEL: 53.3 CM/SEC
BH CV ECHO MEAS - QP/QS: 2.8
BH CV ECHO MEAS - RAP SYSTOLE: 3 MMHG
BH CV ECHO MEAS - RV MAX PG: 1.74 MMHG
BH CV ECHO MEAS - RV V1 MAX: 66 CM/SEC
BH CV ECHO MEAS - RV V1 VTI: 15.9 CM
BH CV ECHO MEAS - RVOT DIAM: 3.1 CM
BH CV ECHO MEAS - RVSP: 27.4 MMHG
BH CV ECHO MEAS - SUP REN AO DIAM: 1.9 CM
BH CV ECHO MEAS - SV(LVOT): 42.3 ML
BH CV ECHO MEAS - SV(MOD-SP2): 40 ML
BH CV ECHO MEAS - SV(MOD-SP4): 63 ML
BH CV ECHO MEAS - SV(RVOT): 119.4 ML
BH CV ECHO MEAS - SVI(LVOT): 22.5 ML/M2
BH CV ECHO MEAS - SVI(MOD-SP2): 21.3 ML/M2
BH CV ECHO MEAS - SVI(MOD-SP4): 33.5 ML/M2
BH CV ECHO MEAS - TAPSE (>1.6): 2.5 CM
BH CV ECHO MEAS - TR MAX PG: 24.4 MMHG
BH CV ECHO MEAS - TR MAX VEL: 247 CM/SEC
BH CV ECHO MEASUREMENTS AVERAGE E/E' RATIO: 8.07
BH CV XLRA - RV BASE: 3.5 CM
BH CV XLRA - RV LENGTH: 6 CM
BH CV XLRA - RV MID: 3.3 CM
BH CV XLRA - TDI S': 10.1 CM/SEC
LEFT ATRIUM VOLUME INDEX: 27.4 ML/M2
LV EF BIPLANE MOD: 66.8 %
SINUS: 3.1 CM
STJ: 3.2 CM

## 2025-07-08 PROCEDURE — 25510000001 PERFLUTREN 6.52 MG/ML SUSPENSION 2 ML VIAL: Performed by: STUDENT IN AN ORGANIZED HEALTH CARE EDUCATION/TRAINING PROGRAM

## 2025-07-08 PROCEDURE — 93306 TTE W/DOPPLER COMPLETE: CPT

## 2025-07-08 RX ADMIN — SODIUM CHLORIDE 2 ML: 9 INJECTION INTRAMUSCULAR; INTRAVENOUS; SUBCUTANEOUS at 09:24

## 2025-07-09 ENCOUNTER — RESULTS FOLLOW-UP (OUTPATIENT)
Dept: CARDIOLOGY | Facility: CLINIC | Age: 65
End: 2025-07-09
Payer: MEDICARE

## 2025-07-09 NOTE — TELEPHONE ENCOUNTER
Reviewed results and recommendations with Nesha Kee.  Patient verbalized understanding of results and recommendations.    Thank you,  Bhavana BECKETT RN  Triage Nurse [facility]  07/09/25    09:21 EDT

## 2025-08-04 DIAGNOSIS — K51.00 ULCERATIVE PANCOLITIS WITHOUT COMPLICATION: ICD-10-CM

## 2025-08-05 RX ORDER — MESALAMINE 1.2 G/1
TABLET, DELAYED RELEASE ORAL
Qty: 120 TABLET | Refills: 11 | OUTPATIENT
Start: 2025-08-05

## 2025-08-06 ENCOUNTER — OFFICE VISIT (OUTPATIENT)
Dept: GASTROENTEROLOGY | Facility: CLINIC | Age: 65
End: 2025-08-06
Payer: MEDICARE

## 2025-08-06 VITALS
SYSTOLIC BLOOD PRESSURE: 140 MMHG | HEIGHT: 65 IN | BODY MASS INDEX: 29.49 KG/M2 | WEIGHT: 177 LBS | DIASTOLIC BLOOD PRESSURE: 88 MMHG

## 2025-08-06 DIAGNOSIS — M85.88 OTHER SPECIFIED DISORDERS OF BONE DENSITY AND STRUCTURE, OTHER SITE: ICD-10-CM

## 2025-08-06 DIAGNOSIS — K42.9 UMBILICAL HERNIA WITHOUT OBSTRUCTION AND WITHOUT GANGRENE: ICD-10-CM

## 2025-08-06 DIAGNOSIS — M85.80 OSTEOPENIA, UNSPECIFIED LOCATION: ICD-10-CM

## 2025-08-06 DIAGNOSIS — K51.00 ULCERATIVE PANCOLITIS WITHOUT COMPLICATION: Primary | ICD-10-CM

## 2025-08-06 RX ORDER — MESALAMINE 1.2 G/1
4800 TABLET, DELAYED RELEASE ORAL DAILY
Qty: 360 TABLET | Refills: 3 | Status: SHIPPED | OUTPATIENT
Start: 2025-08-06

## 2025-08-07 ENCOUNTER — LAB (OUTPATIENT)
Dept: LAB | Facility: HOSPITAL | Age: 65
End: 2025-08-07
Payer: MEDICARE

## 2025-08-07 PROCEDURE — 83993 ASSAY FOR CALPROTECTIN FECAL: CPT

## 2025-08-10 LAB — CALPROTECTIN STL-MCNT: 12 UG/G (ref 0–120)

## 2025-08-18 ENCOUNTER — HOSPITAL ENCOUNTER (OUTPATIENT)
Dept: BONE DENSITY | Facility: HOSPITAL | Age: 65
Discharge: HOME | End: 2025-08-18
Payer: MEDICARE

## 2025-08-18 DIAGNOSIS — M85.88 OTHER SPECIFIED DISORDERS OF BONE DENSITY AND STRUCTURE, OTHER SITE: ICD-10-CM

## 2025-08-18 DIAGNOSIS — M85.80 OSTEOPENIA, UNSPECIFIED LOCATION: ICD-10-CM

## 2025-08-18 PROCEDURE — 77080 DXA BONE DENSITY AXIAL: CPT

## (undated) DEVICE — VIAL FORMALIN CAP 10P 40ML

## (undated) DEVICE — SOL IRR H2O BTL 1000ML STRL

## (undated) DEVICE — SAFELINER SUCTION CANISTER 1000CC: Brand: DEROYAL

## (undated) DEVICE — FRCP BX RADJAW4 NDL 2.8 240CM LG OG BX40

## (undated) DEVICE — BW-412T DISP COMBO CLEANING BRUSH: Brand: SINGLE USE COMBINATION CLEANING BRUSH

## (undated) DEVICE — Device

## (undated) DEVICE — KT ORCA ORCAPOD DISP STRL

## (undated) DEVICE — JACKT LAB F/R KNIT CUFF/COLR XLG BLU

## (undated) DEVICE — ADAPT CLN BIOGUARD AIR/H2O DISP

## (undated) DEVICE — LINER SURG CANSTR SXN S/RIGD 1500CC

## (undated) DEVICE — GLV SURG SENSICARE PI MIC PF SZ8 LF STRL

## (undated) DEVICE — GLV SURG SENSICARE PI MIC PF SZ7.5 LF STRL

## (undated) DEVICE — SYR LL W/SCALE/MARK 3ML STRL

## (undated) DEVICE — SPNG GZ WOVN 4X4IN 12PLY 10/BX STRL

## (undated) DEVICE — SYR LL 3CC